# Patient Record
Sex: MALE | Race: WHITE | Employment: OTHER | URBAN - METROPOLITAN AREA
[De-identification: names, ages, dates, MRNs, and addresses within clinical notes are randomized per-mention and may not be internally consistent; named-entity substitution may affect disease eponyms.]

---

## 2017-01-19 ENCOUNTER — HOSPITAL ENCOUNTER (OUTPATIENT)
Dept: SURGERY | Age: 72
Discharge: HOME | End: 2017-01-19
Payer: MEDICARE

## 2017-01-19 VITALS
TEMPERATURE: 98.9 F | WEIGHT: 293.3 LBS | SYSTOLIC BLOOD PRESSURE: 159 MMHG | OXYGEN SATURATION: 95 % | HEART RATE: 58 BPM | RESPIRATION RATE: 21 BRPM | DIASTOLIC BLOOD PRESSURE: 73 MMHG | HEIGHT: 72 IN | BODY MASS INDEX: 39.73 KG/M2

## 2017-01-19 LAB
CREAT SERPL-MCNC: 0.83 MG/DL (ref 0.8–1.5)
HGB BLD-MCNC: 15.1 G/DL (ref 13.6–17.2)
POTASSIUM SERPL-SCNC: 3.6 MMOL/L (ref 3.5–5.1)

## 2017-01-19 PROCEDURE — 82565 ASSAY OF CREATININE: CPT | Performed by: ANESTHESIOLOGY

## 2017-01-19 PROCEDURE — 84132 ASSAY OF SERUM POTASSIUM: CPT | Performed by: ANESTHESIOLOGY

## 2017-01-19 PROCEDURE — 85018 HEMOGLOBIN: CPT | Performed by: ANESTHESIOLOGY

## 2017-01-19 PROCEDURE — 36415 COLL VENOUS BLD VENIPUNCTURE: CPT | Performed by: ANESTHESIOLOGY

## 2017-01-19 NOTE — PERIOP NOTES
Orders not yet received. Hgb/k/creat not yet resulted. Chart marked and placed in chart room for charge RN to follow up.

## 2017-01-19 NOTE — PERIOP NOTES
Patient verified name, , and surgery as listed in [facility]. TYPE  CASE:2            Orders:NOT YET received, CALLED AND REQUESTED VIA  TO S[facility]. Labs per surgeon:  NO ORDERS AT [address] [place] [address] per anesthesia protocol: HGB/K/CREAT  EKG  :  None needed   BLOOD CONSENT ON CHART       Pt medications obtained  MED LIST, med bottles NOT visualized. Requested pt to validate each medication, dose and frequency while here today. Instructed patient to continue previous medications as prescribed prior to surgery and  to take the following medications the day of surgery according to anesthesia guidelines with a small sip of water : NORCO IF NEEDED, LYRICA, INDERAL, EFFEXOR       Continue all previous medications unless otherwise directed. Instructed patient to hold  the following medications prior to surgery: ALL VITAMINS/SUPPLEMENTS     INSTRUCTED PT TO CONTINUE ASA 81MG (HX OF BLOOD CLOT). INSTRUCTED PT TO BRING CPAP    Patient instructed on the following and verbalized understanding:  Arrive at [facility] entrance, time of arrival to be called the day before by 1700. Responsible adult must drive patient to and from hospital, stay during surgery and 24 hours postoperatively. Npo after midnight including gum, mints and ice chips. Use hibiclens in the shower the night before and the morning of surgery. Leave all valuables at home. Instructed on no jewelry or body piercings on the dos. Bring insurance card and ID. No perfumes, oil, powder, colognes, makeup or  lotions on the skin. Patient verbalized understanding of all instructions and provided all medical/health information to the best of their ability.

## 2017-01-25 ENCOUNTER — ANESTHESIA EVENT (OUTPATIENT)
Dept: SURGERY | Age: 72
DRG: 493 | End: 2017-01-25
Payer: MEDICARE

## 2017-01-26 ENCOUNTER — ANESTHESIA (OUTPATIENT)
Dept: SURGERY | Age: 72
DRG: 493 | End: 2017-01-26
Payer: MEDICARE

## 2017-01-26 ENCOUNTER — SURGERY (OUTPATIENT)
Age: 72
End: 2017-01-26

## 2017-01-26 ENCOUNTER — HOSPITAL ENCOUNTER (INPATIENT)
Age: 72
LOS: 5 days | Discharge: NURSING, REHAB OR LONG-TERM CARE | DRG: 493 | End: 2017-01-31
Attending: ORTHOPAEDIC SURGERY | Admitting: ORTHOPAEDIC SURGERY
Payer: MEDICARE

## 2017-01-26 ENCOUNTER — APPOINTMENT (OUTPATIENT)
Dept: GENERAL RADIOLOGY | Age: 72
DRG: 493 | End: 2017-01-26
Attending: ORTHOPAEDIC SURGERY
Payer: MEDICARE

## 2017-01-26 PROCEDURE — 77030020274 HC MISC IMPL ORTHOPEDIC: Performed by: ORTHOPAEDIC SURGERY

## 2017-01-26 PROCEDURE — 74011000302 HC RX REV CODE- 302: Performed by: ORTHOPAEDIC SURGERY

## 2017-01-26 PROCEDURE — 74011000250 HC RX REV CODE- 250: Performed by: ANESTHESIOLOGY

## 2017-01-26 PROCEDURE — 76010000161 HC OR TIME 1 TO 1.5 HR INTENSV-TIER 1: Performed by: ORTHOPAEDIC SURGERY

## 2017-01-26 PROCEDURE — 76010010054 HC POST OP PAIN BLOCK: Performed by: ORTHOPAEDIC SURGERY

## 2017-01-26 PROCEDURE — 76060000034 HC ANESTHESIA 1.5 TO 2 HR: Performed by: ORTHOPAEDIC SURGERY

## 2017-01-26 PROCEDURE — 0L8P0ZZ DIVISION OF LEFT LOWER LEG TENDON, OPEN APPROACH: ICD-10-PCS | Performed by: ORTHOPAEDIC SURGERY

## 2017-01-26 PROCEDURE — 74011000250 HC RX REV CODE- 250

## 2017-01-26 PROCEDURE — 74011250636 HC RX REV CODE- 250/636: Performed by: ORTHOPAEDIC SURGERY

## 2017-01-26 PROCEDURE — 77030002916 HC SUT ETHLN J&J -A: Performed by: ORTHOPAEDIC SURGERY

## 2017-01-26 PROCEDURE — 77030025281 HC SPLNT ORTHGLS 1 BSNM -B: Performed by: ORTHOPAEDIC SURGERY

## 2017-01-26 PROCEDURE — 77030003602 HC NDL NRV BLK BBMI -B: Performed by: NURSE ANESTHETIST, CERTIFIED REGISTERED

## 2017-01-26 PROCEDURE — 74011250636 HC RX REV CODE- 250/636

## 2017-01-26 PROCEDURE — C1713 ANCHOR/SCREW BN/BN,TIS/BN: HCPCS | Performed by: ORTHOPAEDIC SURGERY

## 2017-01-26 PROCEDURE — 74011250636 HC RX REV CODE- 250/636: Performed by: ANESTHESIOLOGY

## 2017-01-26 PROCEDURE — 77030002933 HC SUT MCRYL J&J -A: Performed by: ORTHOPAEDIC SURGERY

## 2017-01-26 PROCEDURE — 0SPG04Z REMOVAL OF INTERNAL FIXATION DEVICE FROM LEFT ANKLE JOINT, OPEN APPROACH: ICD-10-PCS | Performed by: ORTHOPAEDIC SURGERY

## 2017-01-26 PROCEDURE — 74011250637 HC RX REV CODE- 250/637: Performed by: ORTHOPAEDIC SURGERY

## 2017-01-26 PROCEDURE — 77030011640 HC PAD GRND REM COVD -A: Performed by: ORTHOPAEDIC SURGERY

## 2017-01-26 PROCEDURE — 77030018836 HC SOL IRR NACL ICUM -A: Performed by: ORTHOPAEDIC SURGERY

## 2017-01-26 PROCEDURE — 86580 TB INTRADERMAL TEST: CPT | Performed by: ORTHOPAEDIC SURGERY

## 2017-01-26 PROCEDURE — 77030011884 HC TAPE CST PLSTR BSNM -A: Performed by: ORTHOPAEDIC SURGERY

## 2017-01-26 PROCEDURE — C1769 GUIDE WIRE: HCPCS | Performed by: ORTHOPAEDIC SURGERY

## 2017-01-26 PROCEDURE — 77030020754 HC CUF TRNQT 2BLA STRY -B: Performed by: ORTHOPAEDIC SURGERY

## 2017-01-26 PROCEDURE — 76942 ECHO GUIDE FOR BIOPSY: CPT | Performed by: ORTHOPAEDIC SURGERY

## 2017-01-26 PROCEDURE — 65270000029 HC RM PRIVATE

## 2017-01-26 PROCEDURE — 74011258636 HC RX REV CODE- 258/636: Performed by: ORTHOPAEDIC SURGERY

## 2017-01-26 PROCEDURE — 0SGJ04Z FUSION OF LEFT TARSAL JOINT WITH INTERNAL FIXATION DEVICE, OPEN APPROACH: ICD-10-PCS | Performed by: ORTHOPAEDIC SURGERY

## 2017-01-26 PROCEDURE — 76210000016 HC OR PH I REC 1 TO 1.5 HR: Performed by: ORTHOPAEDIC SURGERY

## 2017-01-26 PROCEDURE — 77030020753 HC CUF TRNQT 1BLA STRY -B: Performed by: ORTHOPAEDIC SURGERY

## 2017-01-26 PROCEDURE — 77030006788 HC BLD SAW OSC STRY -B: Performed by: ORTHOPAEDIC SURGERY

## 2017-01-26 DEVICE — IMPLANTABLE DEVICE: Type: IMPLANTABLE DEVICE | Site: ANKLE | Status: FUNCTIONAL

## 2017-01-26 RX ORDER — LIDOCAINE HYDROCHLORIDE 10 MG/ML
0.3 INJECTION INFILTRATION; PERINEURAL ONCE
Status: DISCONTINUED | OUTPATIENT
Start: 2017-01-26 | End: 2017-01-26 | Stop reason: HOSPADM

## 2017-01-26 RX ORDER — MIDAZOLAM HYDROCHLORIDE 1 MG/ML
2 INJECTION, SOLUTION INTRAMUSCULAR; INTRAVENOUS
Status: COMPLETED | OUTPATIENT
Start: 2017-01-26 | End: 2017-01-26

## 2017-01-26 RX ORDER — SODIUM CHLORIDE 0.9 % (FLUSH) 0.9 %
5-10 SYRINGE (ML) INJECTION AS NEEDED
Status: DISCONTINUED | OUTPATIENT
Start: 2017-01-26 | End: 2017-01-31 | Stop reason: HOSPADM

## 2017-01-26 RX ORDER — PHENOBARBITAL 32.4 MG/1
97.2 TABLET ORAL
Status: DISCONTINUED | OUTPATIENT
Start: 2017-01-26 | End: 2017-01-31 | Stop reason: HOSPADM

## 2017-01-26 RX ORDER — VENLAFAXINE HYDROCHLORIDE 75 MG/1
75 CAPSULE, EXTENDED RELEASE ORAL DAILY
Status: DISCONTINUED | OUTPATIENT
Start: 2017-01-27 | End: 2017-01-31 | Stop reason: HOSPADM

## 2017-01-26 RX ORDER — PREGABALIN 150 MG/1
150 CAPSULE ORAL 2 TIMES DAILY
Status: DISCONTINUED | OUTPATIENT
Start: 2017-01-26 | End: 2017-01-31 | Stop reason: HOSPADM

## 2017-01-26 RX ORDER — LOSARTAN POTASSIUM 50 MG/1
100 TABLET ORAL DAILY
Status: DISCONTINUED | OUTPATIENT
Start: 2017-01-27 | End: 2017-01-31 | Stop reason: HOSPADM

## 2017-01-26 RX ORDER — HYDROMORPHONE HYDROCHLORIDE 1 MG/ML
1 INJECTION, SOLUTION INTRAMUSCULAR; INTRAVENOUS; SUBCUTANEOUS
Status: DISCONTINUED | OUTPATIENT
Start: 2017-01-26 | End: 2017-01-31 | Stop reason: HOSPADM

## 2017-01-26 RX ORDER — LIDOCAINE HYDROCHLORIDE 20 MG/ML
INJECTION, SOLUTION EPIDURAL; INFILTRATION; INTRACAUDAL; PERINEURAL AS NEEDED
Status: DISCONTINUED | OUTPATIENT
Start: 2017-01-26 | End: 2017-01-26 | Stop reason: HOSPADM

## 2017-01-26 RX ORDER — PROPRANOLOL HYDROCHLORIDE 60 MG/1
120 CAPSULE, EXTENDED RELEASE ORAL DAILY
Status: DISCONTINUED | OUTPATIENT
Start: 2017-01-27 | End: 2017-01-31 | Stop reason: HOSPADM

## 2017-01-26 RX ORDER — FENTANYL CITRATE 50 UG/ML
100 INJECTION, SOLUTION INTRAMUSCULAR; INTRAVENOUS ONCE
Status: COMPLETED | OUTPATIENT
Start: 2017-01-26 | End: 2017-01-26

## 2017-01-26 RX ORDER — LANOLIN ALCOHOL/MO/W.PET/CERES
1000 CREAM (GRAM) TOPICAL DAILY
Status: DISCONTINUED | OUTPATIENT
Start: 2017-01-27 | End: 2017-01-31 | Stop reason: HOSPADM

## 2017-01-26 RX ORDER — SODIUM CHLORIDE 0.9 % (FLUSH) 0.9 %
5-10 SYRINGE (ML) INJECTION EVERY 8 HOURS
Status: DISCONTINUED | OUTPATIENT
Start: 2017-01-26 | End: 2017-01-26 | Stop reason: HOSPADM

## 2017-01-26 RX ORDER — OXYCODONE HYDROCHLORIDE 5 MG/1
10 TABLET ORAL
Status: DISCONTINUED | OUTPATIENT
Start: 2017-01-26 | End: 2017-01-31 | Stop reason: HOSPADM

## 2017-01-26 RX ORDER — ENOXAPARIN SODIUM 100 MG/ML
40 INJECTION SUBCUTANEOUS EVERY 24 HOURS
Status: DISCONTINUED | OUTPATIENT
Start: 2017-01-26 | End: 2017-01-31 | Stop reason: HOSPADM

## 2017-01-26 RX ORDER — SODIUM CHLORIDE, SODIUM LACTATE, POTASSIUM CHLORIDE, CALCIUM CHLORIDE 600; 310; 30; 20 MG/100ML; MG/100ML; MG/100ML; MG/100ML
100 INJECTION, SOLUTION INTRAVENOUS CONTINUOUS
Status: ACTIVE | OUTPATIENT
Start: 2017-01-26 | End: 2017-01-26

## 2017-01-26 RX ORDER — SODIUM CHLORIDE 0.9 % (FLUSH) 0.9 %
5-10 SYRINGE (ML) INJECTION AS NEEDED
Status: DISCONTINUED | OUTPATIENT
Start: 2017-01-26 | End: 2017-01-26 | Stop reason: HOSPADM

## 2017-01-26 RX ORDER — SODIUM CHLORIDE 0.9 % (FLUSH) 0.9 %
5-10 SYRINGE (ML) INJECTION EVERY 8 HOURS
Status: DISCONTINUED | OUTPATIENT
Start: 2017-01-26 | End: 2017-01-31 | Stop reason: HOSPADM

## 2017-01-26 RX ORDER — GUAIFENESIN 100 MG/5ML
81 LIQUID (ML) ORAL
Status: DISCONTINUED | OUTPATIENT
Start: 2017-01-26 | End: 2017-01-31 | Stop reason: HOSPADM

## 2017-01-26 RX ORDER — PROPOFOL 10 MG/ML
INJECTION, EMULSION INTRAVENOUS
Status: DISCONTINUED | OUTPATIENT
Start: 2017-01-26 | End: 2017-01-26 | Stop reason: HOSPADM

## 2017-01-26 RX ORDER — ONDANSETRON 2 MG/ML
4 INJECTION INTRAMUSCULAR; INTRAVENOUS
Status: DISCONTINUED | OUTPATIENT
Start: 2017-01-26 | End: 2017-01-31 | Stop reason: HOSPADM

## 2017-01-26 RX ORDER — OXYCODONE HYDROCHLORIDE 5 MG/1
5 TABLET ORAL
Status: DISCONTINUED | OUTPATIENT
Start: 2017-01-26 | End: 2017-01-31 | Stop reason: HOSPADM

## 2017-01-26 RX ORDER — HYDROCHLOROTHIAZIDE 25 MG/1
25 TABLET ORAL DAILY
Status: DISCONTINUED | OUTPATIENT
Start: 2017-01-27 | End: 2017-01-31 | Stop reason: HOSPADM

## 2017-01-26 RX ORDER — SODIUM CHLORIDE, SODIUM LACTATE, POTASSIUM CHLORIDE, CALCIUM CHLORIDE 600; 310; 30; 20 MG/100ML; MG/100ML; MG/100ML; MG/100ML
100 INJECTION, SOLUTION INTRAVENOUS CONTINUOUS
Status: DISCONTINUED | OUTPATIENT
Start: 2017-01-26 | End: 2017-01-26 | Stop reason: HOSPADM

## 2017-01-26 RX ORDER — FAMOTIDINE 10 MG/ML
20 INJECTION INTRAVENOUS ONCE
Status: COMPLETED | OUTPATIENT
Start: 2017-01-26 | End: 2017-01-26

## 2017-01-26 RX ORDER — PROPOFOL 10 MG/ML
INJECTION, EMULSION INTRAVENOUS AS NEEDED
Status: DISCONTINUED | OUTPATIENT
Start: 2017-01-26 | End: 2017-01-26 | Stop reason: HOSPADM

## 2017-01-26 RX ORDER — DEXTROSE, SODIUM CHLORIDE, SODIUM LACTATE, POTASSIUM CHLORIDE, AND CALCIUM CHLORIDE 5; .6; .31; .03; .02 G/100ML; G/100ML; G/100ML; G/100ML; G/100ML
50 INJECTION, SOLUTION INTRAVENOUS CONTINUOUS
Status: DISPENSED | OUTPATIENT
Start: 2017-01-26 | End: 2017-01-27

## 2017-01-26 RX ORDER — MELATONIN
2000 DAILY
Status: DISCONTINUED | OUTPATIENT
Start: 2017-01-27 | End: 2017-01-31 | Stop reason: HOSPADM

## 2017-01-26 RX ORDER — NALOXONE HYDROCHLORIDE 0.4 MG/ML
0.4 INJECTION, SOLUTION INTRAMUSCULAR; INTRAVENOUS; SUBCUTANEOUS AS NEEDED
Status: DISCONTINUED | OUTPATIENT
Start: 2017-01-26 | End: 2017-01-31 | Stop reason: HOSPADM

## 2017-01-26 RX ORDER — HYDROMORPHONE HYDROCHLORIDE 2 MG/ML
0.5 INJECTION, SOLUTION INTRAMUSCULAR; INTRAVENOUS; SUBCUTANEOUS
Status: DISCONTINUED | OUTPATIENT
Start: 2017-01-26 | End: 2017-01-31 | Stop reason: HOSPADM

## 2017-01-26 RX ORDER — ATORVASTATIN CALCIUM 40 MG/1
40 TABLET, FILM COATED ORAL
Status: DISCONTINUED | OUTPATIENT
Start: 2017-01-26 | End: 2017-01-31 | Stop reason: HOSPADM

## 2017-01-26 RX ADMIN — Medication 10 ML: at 22:10

## 2017-01-26 RX ADMIN — SODIUM CHLORIDE, SODIUM LACTATE, POTASSIUM CHLORIDE, AND CALCIUM CHLORIDE 100 ML/HR: 600; 310; 30; 20 INJECTION, SOLUTION INTRAVENOUS at 06:45

## 2017-01-26 RX ADMIN — SODIUM CHLORIDE, SODIUM LACTATE, POTASSIUM CHLORIDE, CALCIUM CHLORIDE, AND DEXTROSE MONOHYDRATE 50 ML/HR: 600; 310; 30; 20; 5 INJECTION, SOLUTION INTRAVENOUS at 17:43

## 2017-01-26 RX ADMIN — PREGABALIN 150 MG: 150 CAPSULE ORAL at 17:41

## 2017-01-26 RX ADMIN — MIDAZOLAM HYDROCHLORIDE 2 MG: 1 INJECTION, SOLUTION INTRAMUSCULAR; INTRAVENOUS at 06:58

## 2017-01-26 RX ADMIN — PROPOFOL 20 MG: 10 INJECTION, EMULSION INTRAVENOUS at 07:25

## 2017-01-26 RX ADMIN — FAMOTIDINE 20 MG: 10 INJECTION, SOLUTION INTRAVENOUS at 06:37

## 2017-01-26 RX ADMIN — TUBERCULIN PURIFIED PROTEIN DERIVATIVE 5 UNITS: 5 INJECTION INTRADERMAL at 22:17

## 2017-01-26 RX ADMIN — FENTANYL CITRATE 100 MCG: 50 INJECTION, SOLUTION INTRAMUSCULAR; INTRAVENOUS at 06:58

## 2017-01-26 RX ADMIN — HYDROMORPHONE HYDROCHLORIDE 0.5 MG: 2 INJECTION, SOLUTION INTRAMUSCULAR; INTRAVENOUS; SUBCUTANEOUS at 15:05

## 2017-01-26 RX ADMIN — ENOXAPARIN SODIUM 40 MG: 40 INJECTION SUBCUTANEOUS at 17:44

## 2017-01-26 RX ADMIN — OXYCODONE HYDROCHLORIDE 5 MG: 5 TABLET ORAL at 22:10

## 2017-01-26 RX ADMIN — CEFAZOLIN 3 G: 1 INJECTION, POWDER, FOR SOLUTION INTRAMUSCULAR; INTRAVENOUS; PARENTERAL at 19:00

## 2017-01-26 RX ADMIN — LIDOCAINE HYDROCHLORIDE 40 MG: 20 INJECTION, SOLUTION EPIDURAL; INFILTRATION; INTRACAUDAL; PERINEURAL at 07:25

## 2017-01-26 RX ADMIN — OXYCODONE HYDROCHLORIDE 5 MG: 5 TABLET ORAL at 17:41

## 2017-01-26 RX ADMIN — HYDROMORPHONE HYDROCHLORIDE 0.5 MG: 2 INJECTION, SOLUTION INTRAMUSCULAR; INTRAVENOUS; SUBCUTANEOUS at 15:44

## 2017-01-26 RX ADMIN — CEFAZOLIN 3 G: 1 INJECTION, POWDER, FOR SOLUTION INTRAMUSCULAR; INTRAVENOUS; PARENTERAL at 07:20

## 2017-01-26 RX ADMIN — ASPIRIN 81 MG CHEWABLE TABLET 81 MG: 81 TABLET CHEWABLE at 22:10

## 2017-01-26 RX ADMIN — PHENOBARBITAL 97.2 MG: 32.4 TABLET ORAL at 22:09

## 2017-01-26 RX ADMIN — PROPOFOL 30 MG: 10 INJECTION, EMULSION INTRAVENOUS at 07:26

## 2017-01-26 RX ADMIN — ATORVASTATIN CALCIUM 40 MG: 40 TABLET, FILM COATED ORAL at 22:10

## 2017-01-26 RX ADMIN — PROPOFOL 75 MCG/KG/MIN: 10 INJECTION, EMULSION INTRAVENOUS at 07:26

## 2017-01-26 RX ADMIN — Medication 5 ML: at 17:44

## 2017-01-26 NOTE — BRIEF OP NOTE
BRIEF OPERATIVE NOTE    Date of Procedure: 1/26/2017   Preoperative Diagnosis: Posterior tibial tendinitis of left leg [M76.822]  Postoperative Diagnosis: Posterior tibial tendinitis of left leg [M76.822]    Procedure(s):  LEFT ANKLE ARTHRODESIS TRIPLE   LEFT ANKLE HARDWARE REMOVAL/ HEEL  LEFT ACHILLES LENGTHENING   Surgeon(s) and Role:     * Abeba Ndiaye MD - Primary            Surgical Staff:  Circ-1: Jim Biggs RN  Radiology Technician: Amanda Prince, RT, R; Parviz Tobias, RT, R, CT  Scrub Tech-1: Neeta Knott  Event Time In   Incision Start 5615   Incision Close 2048     Anesthesia: General   Estimated Blood Loss: min  Specimens: * No specimens in log *   Findings: no  Complications: no  Implants:   Implant Name Type Inv.  Item Serial No.  Lot No. LRB No. Used Action   AUGMENT BONE GRAFT 3CC KIT Bone   [facility] [id number] Left 1 Implanted   FUSEFORCE NITINOL STAPLE KIT Other   [place] [id number] Left 2 Implanted   SCR COMP HDLSS 73-SVG [id number] -- - [id number]   SCR COMP HDLSS 80-QGJ [id number] --   [employer] [place] [id number] Left 1 Implanted

## 2017-01-26 NOTE — ANESTHESIA PROCEDURE NOTES
Popliteal block with ultrasound, saphenous without    Start time: 1/26/2017 6:56 AM  End time: 1/26/2017 7:01 AM  Performed by: Michel Angulo  Authorized by: Michel Angulo       Pre-procedure:    Indications: at surgeon's request and post-op pain management    Preanesthetic Checklist: patient identified, risks and benefits discussed, site marked, timeout performed, anesthesia consent given and patient being monitored    Timeout Time: 06:56          Block Type:   Block Type:  Popliteal  Laterality:  Left  Monitoring:  Continuous pulse ox, frequent vital sign checks, heart rate, oxygen and responsive to questions  Injection Technique:  Single shot  Procedures: ultrasound guided    Prep: chlorhexidine    Location:  Lower thigh  Needle Type:  Stimuplex  Needle Gauge:  20 G  Needle Localization:  Ultrasound guidance  Medication Injected:  0.5%  ropivacaine  Volume (mL):  18  Add'l Medication Injected:  1.5%  mepivacaine  Volume (mL):  18    Assessment:  Number of attempts:  1  Injection Assessment:  Incremental injection every 5 mL, local visualized surrounding nerve on ultrasound, negative aspiration for blood, no intravascular symptoms, no paresthesia and ultrasound image on chart  Patient tolerance:  Patient tolerated the procedure well with no immediate complications  ----------------------------------------------------------------------------------------------------------------------------    Saphenous block left:    TO 3204 [id number]    Chlorprep, 25g needle, 1:1 mix of local, 12ml at the proximal tibia and 8 ml at the ankle without apparent complictions

## 2017-01-26 NOTE — ANESTHESIA POSTPROCEDURE EVALUATION
Post-Anesthesia Evaluation and Assessment    Patient: Robel Loaiza MRN: [id number]  SSN: [id number]    YOB: 1945  Age: 70 y.o. Sex: male       Cardiovascular Function/Vital Signs  Visit Vitals    /68    Pulse (!) 56    Temp 36.4 °C (97.5 °F)    Resp 16    Wt 129.3 kg (285 lb)    SpO2 96%    BMI 38.65 kg/m2       Patient is status post total IV anesthesia anesthesia for Procedure(s):  LEFT ANKLE ARTHRODESIS TRIPLE   LEFT ANKLE HARDWARE REMOVAL/ HEEL  LEFT ACHILLES LENGTHENING . Nausea/Vomiting: None    Postoperative hydration reviewed and adequate. Pain:  Pain Scale 1: Numeric (0 - 10) (01/26/17 0849)  Pain Intensity 1: 0 (01/26/17 0849)   Managed    Neurological Status:   Neuro (WDL): Within Defined Limits (01/26/17 0849)   At baseline    Mental Status and Level of Consciousness: Alert and oriented     Pulmonary Status:   O2 Device: Room air (01/26/17 0907)   Adequate oxygenation and airway patent    Complications related to anesthesia: None    Post-anesthesia assessment completed.  No concerns    Signed By: Hawk Boothe MD     January 26, 2017

## 2017-01-26 NOTE — PROGRESS NOTES
Admission database completed. Patient oriented to room and call button.  New patient packet with  medication side effects fact sheet  given to patient and family and reviewed No concerns voiced at this time Primary nurse Kelli Florentino RN updated

## 2017-01-26 NOTE — PROGRESS NOTES
TRANSFER - IN REPORT:    Verbal report received from Johana Aviles RN on [address]  being received from PACU for routine post - op      Report consisted of patients Situation, Background, Assessment and   Recommendations(SBAR). Information from the following report(s) SBAR, Kardex, Procedure Summary, Intake/Output and MAR was reviewed with the receiving nurse. Opportunity for questions and clarification was provided.

## 2017-01-26 NOTE — PROGRESS NOTES
Dual skin assessment completed by Martha Acevedo.MARIMAR and Amara Holbrook.MARIMAR. Dressing to L foot reinforced due to drainage. No other skin impairments noted.

## 2017-01-26 NOTE — OP NOTES
[address]   OPERATIVE REPORT       Name:  Ulises Flynn   MR#:  [id number]   :  1945   Account #:  [de-identified]   Date of Adm:  2017       DATE OF PROCEDURE: 2017      PREOPERATIVE DIAGNOSES   1. Left heel painful hardware. 2. 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 POSTOPERATIVE DIAGNOSES   1. Left heel painful hardware. 2. 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 NAME OF PROCEDURE   1. Left heel hardware removal.   2. Left triple hemisection Achilles lengthening. 3. Left triple arthrodesis. SURGEON: Sergo Kumar MD.    ANESTHESIA: Popliteal block with monitored anesthesia care. ESTIMATED BLOOD LOSS: Minimal.      TOURNIQUET TIME: 69 minutes at 250 mmHg. ANTIBIOTIC PROPHYLAXIS: Ancef given prior to procedure. INDICATIONS FOR PROCEDURE: Mr. Idalmis Arreola is a 70-year-old white male   with a remote history of a flat foot reconstruction with   hindfoot arthritis who has failed conservative therapy and   desires surgical treatment. Risks and benefits of procedure   including, but not limited to risk of anesthetic complications   including myocardial infarction, stroke, and death, as well as   surgical complications including damage to nerves and blood   vessels, risk of infection, incomplete pain relief, risk of   malunion, risk of nonunion, need for additional surgery have   been discussed at length with the patient who understands the   risks and wishes to proceed with surgery at this time. DETAILS OF PROCEDURE: The patient's operative site was marked   with indelible ink in the preoperative holding area. A block was   placed by the Department of Anesthesia. The patient was taken to   the operating room, placed supine. During a preop surgical   timeout, the left lower extremity was identified as the correct   surgical site, prepped and draped in a standard sterile fashion   using ChloraPrep solution.  A triple hemisection Achilles   lengthening was performed through 3 small stab incisions in the   Achilles. At that point, a posterior approach to the heel was then   performed. The patient's previous screw from his osteotomy was   removed at that time. The wound was then irrigated and closed   using Monocryl and nylon sutures. Lateral approach to the sinus   tarsi was then performed at that time. The underlying subtalar   joint was then prepared using a curette an osteotome and a   separate incision was made over the anterior medial   talonavicular joint, which was also prepared in a similar   fashion. These were brought in the desired clinical alignment   and a Synthes 6.5 mm screw was advanced from the talar head and   neck junction down to the body of the calcaneus with good   purchase noted and 2 [facility] compression staples   were placed across the talonavicular joint as well. The wound   was then irrigated and closed using Monocryl and nylon sutures. Sterile dressing was then applied, followed by well-padded   posterior splint. Anesthesia was discontinued. The patient was   subsequently transferred to recovery bed, taken to the recovery   room in satisfactory condition. He appeared to tolerate the   procedure well. There were no apparent surgical or anesthetic   complications. All needle and sponge counts were correct.         MD Alyson Collado / Ryder Nunez   D:  01/26/2017   14:29   T:  01/26/2017   16:27   Job #:  [id number]

## 2017-01-26 NOTE — ANESTHESIA PREPROCEDURE EVALUATION
Anesthetic History               Review of Systems / Medical History  Patient summary reviewed and pertinent labs reviewed    Pulmonary        Sleep apnea: CPAP           Neuro/Psych     seizures (last one 20 years ago): well controlled         Cardiovascular    Hypertension: well controlled              Exercise tolerance: >4 METS  Comments: thromboembolus   GI/Hepatic/Renal         Renal disease (bladder bleeding last year)       Endo/Other        Morbid obesity     Other Findings              Physical Exam    Airway  Mallampati: III  TM Distance: 4 - 6 cm  Neck ROM: normal range of motion   Mouth opening: Normal     Cardiovascular  Regular rate and rhythm,  S1 and S2 normal,  no murmur, click, rub, or gallop             Dental         Pulmonary  Breath sounds clear to auscultation               Abdominal         Other Findings            Anesthetic Plan    ASA: 3  Anesthesia type: total IV anesthesia      Post-op pain plan if not by surgeon: peripheral nerve block single    Induction: Intravenous  Anesthetic plan and risks discussed with: Patient and Spouse

## 2017-01-26 NOTE — PERIOP NOTES
TRANSFER - OUT REPORT:    Verbal report given to [address]  being transferred   t) for routine progression of care       Report consisted of patients Situation, Background, Assessment and   Recommendations(SBAR). Information from the following report(s) SBAR, OR Summary, Procedure Summary, Intake/Output, MAR and Cardiac Rhythm SB was reviewed with the receiving nurse. Opportunity for questions and clarification was provided.       Patient transported with:   {TRANSPORTDETAILS:56262}

## 2017-01-27 LAB
MM INDURATION POC: NORMAL MM (ref 0–5)
PPD POC: NORMAL NEGATIVE

## 2017-01-27 PROCEDURE — 74011250637 HC RX REV CODE- 250/637: Performed by: ORTHOPAEDIC SURGERY

## 2017-01-27 PROCEDURE — 74011250636 HC RX REV CODE- 250/636: Performed by: ORTHOPAEDIC SURGERY

## 2017-01-27 PROCEDURE — 97162 PT EVAL MOD COMPLEX 30 MIN: CPT

## 2017-01-27 PROCEDURE — 97530 THERAPEUTIC ACTIVITIES: CPT

## 2017-01-27 PROCEDURE — 65270000029 HC RM PRIVATE

## 2017-01-27 RX ADMIN — B-COMPLEX W/ C & FOLIC ACID TAB 1 TABLET: TAB at 08:12

## 2017-01-27 RX ADMIN — CEFAZOLIN 3 G: 1 INJECTION, POWDER, FOR SOLUTION INTRAMUSCULAR; INTRAVENOUS; PARENTERAL at 02:00

## 2017-01-27 RX ADMIN — OXYCODONE HYDROCHLORIDE 5 MG: 5 TABLET ORAL at 14:45

## 2017-01-27 RX ADMIN — Medication 10 ML: at 06:00

## 2017-01-27 RX ADMIN — PREGABALIN 150 MG: 150 CAPSULE ORAL at 08:12

## 2017-01-27 RX ADMIN — PREGABALIN 150 MG: 150 CAPSULE ORAL at 17:00

## 2017-01-27 RX ADMIN — OXYCODONE HYDROCHLORIDE 5 MG: 5 TABLET ORAL at 22:38

## 2017-01-27 RX ADMIN — ENOXAPARIN SODIUM 40 MG: 40 INJECTION SUBCUTANEOUS at 17:00

## 2017-01-27 RX ADMIN — CYANOCOBALAMIN TAB 1000 MCG 1000 MCG: 1000 TAB at 08:12

## 2017-01-27 RX ADMIN — VITAMIN D, TAB 1000IU (100/BT) 2000 UNITS: 25 TAB at 08:12

## 2017-01-27 RX ADMIN — PROPRANOLOL HYDROCHLORIDE 120 MG: 60 CAPSULE, EXTENDED RELEASE ORAL at 08:12

## 2017-01-27 RX ADMIN — ASPIRIN 81 MG CHEWABLE TABLET 81 MG: 81 TABLET CHEWABLE at 22:38

## 2017-01-27 RX ADMIN — ATORVASTATIN CALCIUM 40 MG: 40 TABLET, FILM COATED ORAL at 22:38

## 2017-01-27 RX ADMIN — Medication 10 ML: at 22:38

## 2017-01-27 RX ADMIN — OXYCODONE HYDROCHLORIDE 5 MG: 5 TABLET ORAL at 08:10

## 2017-01-27 RX ADMIN — VENLAFAXINE HYDROCHLORIDE 75 MG: 75 CAPSULE, EXTENDED RELEASE ORAL at 08:12

## 2017-01-27 RX ADMIN — LOSARTAN POTASSIUM 100 MG: 50 TABLET ORAL at 08:12

## 2017-01-27 RX ADMIN — HYDROMORPHONE HYDROCHLORIDE 1 MG: 1 INJECTION, SOLUTION INTRAMUSCULAR; INTRAVENOUS; SUBCUTANEOUS at 01:25

## 2017-01-27 RX ADMIN — HYDROCHLOROTHIAZIDE 25 MG: 25 TABLET ORAL at 08:12

## 2017-01-27 RX ADMIN — PHENOBARBITAL 97.2 MG: 32.4 TABLET ORAL at 22:38

## 2017-01-27 NOTE — ADVANCED PRACTICE NURSE
Anesthesia Post Op Note: Patient denies any complaints or concerns with their anesthesia experience.

## 2017-01-27 NOTE — PROGRESS NOTES
Problem: Mobility Impaired (Adult and Pediatric)  Goal: *Acute Goals and Plan of Care (Insert Text)  STG:  (1.)Mr. John will move from supine to sit and sit to supine , scoot up and down and roll side to side with STAND BY ASSIST within 3 day(s). (2.)Mr. John will transfer from bed to chair and chair to bed with MINIMAL ASSIST using the least restrictive device within 3 day(s). (3.)Mr. John will ambulate with MAXIMAL ASSIST for 10 feet with the least restrictive device within 3 day(s). (4.)Mr. John will participate in therapeutic activity/exerices x 15 minutes for increased strength within 3 days. LTG:  (1.)Mr. John will move from supine to sit and sit to supine , scoot up and down and roll side to side in bed with INDEPENDENCE within 7 day(s). (2.)Mr. John will transfer from bed to chair and chair to bed with STAND BY ASSIST using the least restrictive device within 7 day(s). (3.)Mr. John will ambulate with CONTACT GUARD ASSIST for 30+ feet with the least restrictive device within 7 day(s). (4.)Mr. John will participate in therapeutic activity/exerices x 20 minutes for increased strength within 7 days.     ________________________________________________________________________________________________      PHYSICAL THERAPY: Daily Note, Treatment Day: Day of Assessment and PM 1/27/2017  INPATIENT: Hospital Day: 2  [place] / [place] / Product Type: Medicare /    L LE NWB     NAME/AGE/GENDER: Robel Loaiza is a 70 y.o. male        PRIMARY DIAGNOSIS: Posterior tibial tendinitis of left leg [M76.822] <principal problem not specified> <principal problem not specified>  Procedure(s) (LRB):  LEFT ANKLE ARTHRODESIS TRIPLE  (Left)  LEFT ANKLE HARDWARE REMOVAL/ HEEL (Left)  LEFT ACHILLES LENGTHENING  (Left)  1 Day Post-Op  ICD-10: Treatment Diagnosis:       · Generalized Muscle Weakness (M62.81)  · Difficulty in walking, Not elsewhere classified (R26.2)  · Other abnormalities of gait and mobility (R26.89)   Precaution/Allergies:  Adhesive and Bactrim [sulfamethoxazole-trimethoprim]       ASSESSMENT:      Mr. Marquis Leslie is a pleasant 70 y.o. male s/p above who is NWB on L LE. Pt was sitting up in chair and ready to get back into bed. Pt stood with mod assist x 2 from low chair and stood a few minutes to jeremy brief. pt then hopped a couple of small steps to bed using rolling walker and min/mod assist x 2. Pt did very well maintaining NWB status. Pt returned supine with min assist for L LE. Pt making good progress. will continue with POC. This section established at most recent assessment   PROBLEM LIST (Impairments causing functional limitations):  1. Decreased Strength  2. Decreased Transfer Abilities  3. Decreased Ambulation Ability/Technique  4. Decreased Balance  5. Decreased Activity Tolerance    INTERVENTIONS PLANNED: (Benefits and precautions of physical therapy have been discussed with the patient.)  1. Balance Exercise  2. Bed Mobility  3. Family Education  4. Gait Training  5. Neuromuscular Re-education/Strengthening  6. Therapeutic Activites  7. Therapeutic Exercise/Strengthening  8. Transfer Training  9. Group Therapy      TREATMENT PLAN: Frequency/Duration: twice daily for duration of hospital stay  Rehabilitation Potential For Stated Goals: GOOD      RECOMMENDED REHABILITATION/EQUIPMENT: (at time of discharge pending progress): Continue Skilled Therapy.                    HISTORY:   History of Present Injury/Illness (Reason for Referral):  Per pt chart: Review of Systems / Medical History  Patient summary reviewed and pertinent labs reviewed     Pulmonary  Sleep apnea: CPAP Neuro/Psych   seizures (last one 20 years ago): well controlled Cardiovascular  Hypertension: well controlled  Exercise tolerance: >4 METS  Comments: thromboembolus   GI/Hepatic/Renal  Renal disease (bladder bleeding last year) Endo/Other  Morbid obesity Other Findings Physical Exam     Airway  Mallampati: III  TM Distance: 4 - 6 cm  Neck ROM: normal range of motion   Mouth opening: Normal Cardiovascular  Regular rate and rhythm, S1 and S2 normal, no murmur, click, rub, or gallop Dental   Pulmonary  Breath sounds clear to auscultation Abdominal Other Findings          Past Medical History/Comorbidities:   Mr. Jass Garrido  has a past medical history of Anxiety; Cancer ([facility] [address] (2010); Chronic pain; Hyperlipidemia; Hypertension; Morbid obesity ([facility] [address] Osteoarthritis; Personal history of prostate cancer; Seizures ([facility] [address] (first one 30's); Sleep apnea; and Thromboembolus ([facility] [address] (~2009). Mr. Jass Garrido  has a past surgical history that includes bladder suspension; other surgical; knee arthroscopy; knee replacement; orthopaedic; prostatectomy (12/2010); and cataract removal (Bilateral). Social History/Living Environment:   Home Environment: Private residence  # Steps to Enter: 3  Rails to Enter: Yes  Hand Rails : Left  One/Two Story Residence: One story  Living Alone: No  Support Systems: Spouse/Significant Other/Partner  Patient Expects to be Discharged to[de-identified] Rehabilitation facility  Current DME Used/Available at Home: Cane, straight, Raised toilet seat, Shower chair, Commode, bedside  Tub or Shower Type: Tub/Shower combination  Prior Level of Function/Work/Activity:  Pt reports he lives in a one story home with his wife that has 3 steps to enter. He states that he uses a cane for ambulation but is independent with his ADLs. Number of Personal Factors/Comorbidities that affect the Plan of Care:  · Anxiety  · Cancer  · Morbid obesity 3+: HIGH COMPLEXITY   EXAMINATION:   Most Recent Physical Functioning:   Gross Assessment:                  Posture:     Balance:    Bed Mobility:     Wheelchair Mobility:     Transfers:  Sit to Stand:  Moderate assistance;Assist x2  Stand to Sit: Minimum assistance;Assist x2  Bed to Chair: Minimum assistance;Assist x2  Gait:  Left Side Weight Bearing: Non-weight bearing          Body Structures Involved:  1. Nerves  2. Bones  3. Joints  4. Muscles Body Functions Affected:  1. Sensory/Pain  2. Neuromusculoskeletal  3. Movement Related Activities and Participation Affected:  1. General Tasks and Demands  2. Mobility  3. Domestic Life   Number of elements that affect the Plan of Care: 4+: HIGH COMPLEXITY   CLINICAL PRESENTATION:   Presentation: Evolving clinical presentation with changing clinical characteristics: MODERATE COMPLEXITY   CLINICAL DECISION MAKIN [facility] Mobility Inpatient Short Form  How much difficulty does the patient currently have. .. Unable A Lot A Little None   1. Turning over in bed (including adjusting bedclothes, sheets and blankets)? [ ] 1   [ ] 2   [ ] 3   [X] 4   2. Sitting down on and standing up from a chair with arms ( e.g., wheelchair, bedside commode, etc.)   [ ] 1   [X] 2   [ ] 3   [ ] 4   3. Moving from lying on back to sitting on the side of the bed? [ ] 1   [ ] 2   [X] 3   [ ] 4   How much help from another person does the patient currently need. .. Total A Lot A Little None   4. Moving to and from a bed to a chair (including a wheelchair)? [ ] 1   [ ] 2   [X] 3   [ ] 4   5. Need to walk in hospital room? [ ] 1   [X] 2   [ ] 3   [ ] 4   6. Climbing 3-5 steps with a railing? [X] 1   [ ] 2   [ ] 3   [ ] 4   © , Trustees of [address] [place] [address], under license to [employer]. All rights reserved    Score:  Initial: 15 Most Recent: X (Date: -- )     Interpretation of Tool:  Represents activities that are increasingly more difficult (i.e. Bed mobility, Transfers, Gait).        Score 24 23 22-20 19-15 14-10 9-7 6       Modifier CH CI CJ CK CL CM CN         · Mobility - Walking and Moving Around:               - CURRENT STATUS:    CK - 40%-59% impaired, limited or restricted               - GOAL STATUS:           CJ - 20%-39% impaired, limited or restricted  - D/C STATUS:                       ---------------To be determined---------------  [place] / [place] / Product Type: Medicare /       Medical Necessity:     · Patient demonstrates good rehab potential due to higher previous functional level. Reason for Services/Other Comments:  · Patient continues to require skilled intervention due to decreased functional mobility, balance impairments, and difficulty with walking. Use of outcome tool(s) and clinical judgement create a POC that gives a: Questionable prediction of patient's progress: MODERATE COMPLEXITY                 TREATMENT:      Pre-treatment Symptoms/Complaints:  Some ankle/foot pain  Pain: Initial:      Post Session:  2/10      Therapeutic Activity: (    15 minutes): Therapeutic activities including Bed transfers, Chair transfers and Ambulation on level ground to improve mobility, strength and balance. Required minimal   to promote dynamic balance in standing. Treatment/Session Assessment:    · Response to Treatment:  See above  · Interdisciplinary Collaboration:  · Physical Therapy Assistant, Registered Nurse and Certified Nursing Assistant/Patient Care Technician  · After treatment position/precautions:  · Up in chair  · Bed/Chair-wheels locked  · Call light within reach  · Posey alarm activated  · Compliance with Program/Exercises: Will assess as treatment progresses. · Recommendations/Intent for next treatment session: \"Next visit will focus on advancements to more challenging activities and reduction in assistance provided\".   Total Treatment Duration:  PT Patient Time In/Time Out  Time In: 1345  Time Out: 91 [facility]

## 2017-01-27 NOTE — PROGRESS NOTES
Problem: Nutrition Deficit  Goal: *Optimize nutritional status  Nutrition  Reason for assessment: Referral received from nursing admission Malnutrition Screening Tool for recently lost 15# without trying and eating poorly due to decreased appetite. Assessment:   Diet order(s): regular  Food/Nutrition Patient History:  Patient s/p surgical intervention of ankle on 1/26. RD acknowledges patient with h/o HTN, renal disease, and CPAP use at home. Expect patient to discharge to rehab facility. Patient reports that he has lost 10-12 pounds due to some intentional diet changes. He reports that he has just stopped a little bit less of \"this and that. \"  After RD pressing patient for more information, he reports that he is a big meat and vegetable eater but likes his snacks. He reports snacking on little flaco cakes, chocolate mints, etc.  He states that he had a donut last night brought by his wife. He reports that he had to go to rehab ~1.5 years ago and thinks he gained 50 pounds while he was there due to excessive carbohydrate snacking. He reports that he does love fruit and vegetables but does not think to snack on those items. He does make fruit jams and butters at home. RD encouraged patient to choose nutrient dense snacks to replace high carbohydrate non-nutrient dense snacks. RD also introduced the concept of \"mindfull eating. \"  Patient is receptive. Anthropometrics: Ht: 6 ft,  Weight: 129.3 kg (285 lb) (wife states he has lost weight), unstated  , Body mass index is 38.65 kg/(m^2). BMI class of obesity class II.    WT / BMI 1/26/2017 1/19/2017 12/13/2016 9/12/2016 9/3/2016   WEIGHT 285 lb 293 lb 4.8 oz 295 lb 12.8 oz 286 lb 287 lb 9.6 oz       WT / BMI 8/30/2016 8/18/2016 8/15/2016 8/10/2016 8/7/2016 8/5/2016   WEIGHT 296 lb 296 lb 296 lb 297 lb 296 lb 296 lb       WT / BMI 8/3/2016 7/30/2016 7/29/2016 7/28/2016 7/27/2016   WEIGHT 300 lb 300 lb 298 lb 298 lb 6.4 oz 298 lb       WT / BMI 6/20/2016 6/17/2016 5/13/2016 4/29/2016 4/21/2016   WEIGHT 300 lb 300 lb 300 lb 304 lb 304 lb       WT / BMI 4/14/2016 3/7/2016 1/29/2016   WEIGHT 304 lb 5 oz 298 lb 291 lb   Patient has had a ~6 pound clinically insignificant weight loss over ~1 year. Patient has had a 10 pound, 3.3% clinically insignificant weight loss within ~1 month. Macronutrient needs:  EER:  9452-1057 kcal /day (12-15 kcal/kg listed BW)  EPR:   grams protein/day (1.2-1.5 grams/kg IBW)  Intake/Comparative Standards: Per RD meal rounds: 100% of breakfast. No recorded meal intake. Nutrition Diagnosis: Excessive energy intake r/t excessive snacking, as evidenced by patient obesity class II, diet recall high in non-nutrient dense high CHO snack foods. Intervention:  Meals and snacks: Continue current diet. Double protein portions as requested.      Candi Morgan [address], [address] [place] [address], [phone]

## 2017-01-27 NOTE — PROGRESS NOTES
ORTH FRACTURE PROGRESS NOTE    2017  Admit Date:   2017    Post Op day: 1 Day Post-Op    Subjective:    Deyanira John     PT/OT:   Gait:                    Vital Signs:    Patient Vitals for the past 8 hrs:   BP Temp Pulse Resp SpO2   17 1058 121/62 98.2 °F (36.8 °C) 71 16 96 %   17 0717 138/72 98.8 °F (37.1 °C) 75 16 92 %     Temp (24hrs), Av.6 °F (37 °C), Min:97.8 °F (36.6 °C), Max:99.5 °F (37.5 °C)      Pain Control:   Pain Assessment  Pain Scale 1: Numeric (0 - 10)  Pain Intensity 1: 0  Pain Onset 1: Post op  Pain Location 1: Foot  Pain Orientation 1: Left  Pain Description 1: Dull  Pain Intervention(s) 1: Medication (see MAR)    Meds:    Current Facility-Administered Medications   Medication Dose Route Frequency    sodium chloride (NS) flush 5-10 mL  5-10 mL IntraVENous PRN    oxyCODONE IR (ROXICODONE) tablet 10 mg  10 mg Oral ONCE PRN    HYDROmorphone (PF) (DILAUDID) injection 0.5 mg  0.5 mg IntraVENous Multiple    promethazine (PHENERGAN) with saline injection 6.25 mg  6.25 mg IntraVENous Q15MIN PRN    aspirin chewable tablet 81 mg  81 mg Oral QHS    atorvastatin (LIPITOR) tablet 40 mg  40 mg Oral QHS    cholecalciferol (VITAMIN D3) tablet 2,000 Units  2,000 Units Oral DAILY    cyanocobalamin tablet 1,000 mcg  1,000 mcg Oral DAILY    hydroCHLOROthiazide (HYDRODIURIL) tablet 25 mg  25 mg Oral DAILY    losartan (COZAAR) tablet 100 mg  100 mg Oral DAILY    multivitamin, stress formula (STRESS TAB) tablet 1 Tab  1 Tab Oral DAILY    mirabegron ER (MYRBETRIQ) tablet 50 mg  50 mg Oral DAILY    PHENobarbital (LUMINAL) tablet 97.2 mg  97.2 mg Oral QHS    pregabalin (LYRICA) capsule 150 mg  150 mg Oral BID    propranolol LA (INDERAL LA) capsule 120 mg  120 mg Oral DAILY    venlafaxine-SR (EFFEXOR-XR) capsule 75 mg  75 mg Oral DAILY    dextrose 5% lactated ringers infusion  50 mL/hr IntraVENous CONTINUOUS    sodium chloride (NS) flush 5-10 mL  5-10 mL IntraVENous Q8H    sodium chloride (NS) flush 5-10 mL  5-10 mL IntraVENous PRN    oxyCODONE IR (ROXICODONE) tablet 5 mg  5 mg Oral Q4H PRN    HYDROmorphone (PF) (DILAUDID) injection 1 mg  1 mg IntraVENous Q4H PRN    naloxone (NARCAN) injection 0.4 mg  0.4 mg IntraVENous PRN    ondansetron (ZOFRAN) injection 4 mg  4 mg IntraVENous Q4H PRN    enoxaparin (LOVENOX) injection 40 mg  40 mg SubCUTAneous Q24H       LAB:    No results for input(s): HCT, HCTEXT, HGB, HGBEXT, INR, HCTEXT, HGBEXT in the last 72 hours.     No lab exists for component: INREXT, INREXT    24 Hour Assessment Issues:    Oriented    Discharge Planning: SNF    Transfuse PRBC's:      Assessment & Physician's Comment:  Neurovascular checks within normal limits    Active Problems:    Tibialis tendinitis (3/12/2015)        Plan:  Cont PT  PICC today  Placement likely Monday    Elaina Hall MD

## 2017-01-27 NOTE — PROGRESS NOTES
Problem: Mobility Impaired (Adult and Pediatric)  Goal: *Acute Goals and Plan of Care (Insert Text)  STG:  (1.)Mr. John will move from supine to sit and sit to supine , scoot up and down and roll side to side with STAND BY ASSIST within 3 day(s). (2.)Mr. John will transfer from bed to chair and chair to bed with MINIMAL ASSIST using the least restrictive device within 3 day(s). (3.)Mr. John will ambulate with MAXIMAL ASSIST for 10 feet with the least restrictive device within 3 day(s). (4.)Mr. John will participate in therapeutic activity/exerices x 15 minutes for increased strength within 3 days. LTG:  (1.)Mr. John will move from supine to sit and sit to supine , scoot up and down and roll side to side in bed with INDEPENDENCE within 7 day(s). (2.)Mr. John will transfer from bed to chair and chair to bed with STAND BY ASSIST using the least restrictive device within 7 day(s). (3.)Mr. John will ambulate with CONTACT GUARD ASSIST for 30+ feet with the least restrictive device within 7 day(s). (4.)Mr. John will participate in therapeutic activity/exerices x 20 minutes for increased strength within 7 days.     ________________________________________________________________________________________________      PHYSICAL THERAPY: INITIAL ASSESSMENT, AM 1/27/2017  INPATIENT: Hospital Day: 2  [place] / Product Type: Medicare /    L LE NWB     NAME/AGE/GENDER: Aiyana Shoulder is a 70 y.o. male        PRIMARY DIAGNOSIS: Posterior tibial tendinitis of left leg [M76.822] <principal problem not specified> <principal problem not specified>  Procedure(s) (LRB):  LEFT ANKLE ARTHRODESIS TRIPLE  (Left)  LEFT ANKLE HARDWARE REMOVAL/ HEEL (Left)  LEFT ACHILLES LENGTHENING  (Left)  1 Day Post-Op  ICD-10: Treatment Diagnosis:       · Generalized Muscle Weakness (M62.81)  · Difficulty in walking, Not elsewhere classified (R26.2)  · Other abnormalities of gait and mobility (R26.89)   Precaution/Allergies:  Adhesive and Bactrim [sulfamethoxazole-trimethoprim]       ASSESSMENT:      Mr. Debbi Gleason is a pleasant 70 y.o. male s/p above who is NWB on L LE. Pt was supine in bed upon arrival reporting L ankle pain of 1/10 and agreeable to PT. Pt presents to PT with generalized weakness and decreased AROM in L hip flexion. Pt was WFL AROM and strength in his R LE with the exception of his R ankle which has no AROM, reportedly due to peripheral neuropathy. Pt was able to come sitting on the bed with SBA and demonstrates good sitting balance. Mr. Debbi Gleason required max assist x 2 with RW for STS and has fair to poor standing balance. Pt transferred to bedside chair using heel-toe pivot with mod assist x2/RW. Pt had episode of urinary incontinence during transfers and new brief was placed under pt in chair. Pt appeared fatigued after transfer but VS were stable. Pt could benefit from skilled PT to return to baseline. This section established at most recent assessment   PROBLEM LIST (Impairments causing functional limitations):  1. Decreased Strength  2. Decreased Transfer Abilities  3. Decreased Ambulation Ability/Technique  4. Decreased Balance  5. Decreased Activity Tolerance    INTERVENTIONS PLANNED: (Benefits and precautions of physical therapy have been discussed with the patient.)  1. Balance Exercise  2. Bed Mobility  3. Family Education  4. Gait Training  5. Neuromuscular Re-education/Strengthening  6. Therapeutic Activites  7. Therapeutic Exercise/Strengthening  8. Transfer Training  9. Group Therapy      TREATMENT PLAN: Frequency/Duration: twice daily for duration of hospital stay  Rehabilitation Potential For Stated Goals: GOOD      RECOMMENDED REHABILITATION/EQUIPMENT: (at time of discharge pending progress): Continue Skilled Therapy.                    HISTORY:   History of Present Injury/Illness (Reason for Referral):  Per pt chart: Review of Systems / Medical History  Patient summary reviewed and pertinent labs reviewed     Pulmonary  Sleep apnea: CPAP Neuro/Psych   seizures (last one 20 years ago): well controlled Cardiovascular  Hypertension: well controlled  Exercise tolerance: >4 METS  Comments: thromboembolus   GI/Hepatic/Renal  Renal disease (bladder bleeding last year) Endo/Other  Morbid obesity Other Findings                Physical Exam     Airway  Mallampati: III  TM Distance: 4 - 6 cm  Neck ROM: normal range of motion   Mouth opening: Normal Cardiovascular  Regular rate and rhythm, S1 and S2 normal, no murmur, click, rub, or gallop Dental   Pulmonary  Breath sounds clear to auscultation Abdominal Other Findings          Past Medical History/Comorbidities:   Mr. Erasmo Brown  has a past medical history of Anxiety; Cancer ([facility] [address] (2010); Chronic pain; Hyperlipidemia; Hypertension; Morbid obesity ([facility] [address] Osteoarthritis; Personal history of prostate cancer; Seizures ([facility] [address] (first one 30's); Sleep apnea; and Thromboembolus ([facility] [address] (~2009). Mr. Erasmo Brown  has a past surgical history that includes bladder suspension; other surgical; knee arthroscopy; knee replacement; orthopaedic; prostatectomy (12/2010); and cataract removal (Bilateral). Social History/Living Environment:   Home Environment: Private residence  # Steps to Enter: 3  Rails to Enter: Yes  Hand Rails : Left  One/Two Story Residence: One story  Living Alone: No  Support Systems: Spouse/Significant Other/Partner  Patient Expects to be Discharged to[de-identified] Rehabilitation facility  Current DME Used/Available at Home: Cane, straight, Raised toilet seat, Shower chair, Commode, bedside  Tub or Shower Type: Tub/Shower combination  Prior Level of Function/Work/Activity:  Pt reports he lives in a one story home with his wife that has 3 steps to enter. He states that he uses a cane for ambulation but is independent with his ADLs.       Number of Personal Factors/Comorbidities that affect the Plan of Care:  · Anxiety  · Cancer  · Morbid obesity 3+: HIGH COMPLEXITY   EXAMINATION:   Most Recent Physical Functioning:   Gross Assessment:  AROM: Generally decreased, functional (L HF)  Strength: Generally decreased, functional (L HF)               Posture:     Balance:  Sitting: Intact  Standing: Impaired  Standing - Static: Fair  Standing - Dynamic : Poor Bed Mobility:  Supine to Sit: Stand-by asssistance  Wheelchair Mobility:     Transfers:  Sit to Stand: Maximum assistance;Assist x2  Stand to Sit: Minimum assistance;Assist x2  Bed to Chair: Minimum assistance; Moderate assistance;Assist x2  Gait:  Left Side Weight Bearing: Non-weight bearing          Body Structures Involved:  1. Nerves  2. Bones  3. Joints  4. Muscles Body Functions Affected:  1. Sensory/Pain  2. Neuromusculoskeletal  3. Movement Related Activities and Participation Affected:  1. General Tasks and Demands  2. Mobility  3. Domestic Life   Number of elements that affect the Plan of Care: 4+: HIGH COMPLEXITY   CLINICAL PRESENTATION:   Presentation: Evolving clinical presentation with changing clinical characteristics: MODERATE COMPLEXITY   CLINICAL DECISION MAKIN [facility] Inpatient Short Form  How much difficulty does the patient currently have. .. Unable A Lot A Little None   1. Turning over in bed (including adjusting bedclothes, sheets and blankets)? [ ] 1   [ ] 2   [ ] 3   [X] 4   2. Sitting down on and standing up from a chair with arms ( e.g., wheelchair, bedside commode, etc.)   [ ] 1   [X] 2   [ ] 3   [ ] 4   3. Moving from lying on back to sitting on the side of the bed? [ ] 1   [ ] 2   [X] 3   [ ] 4   How much help from another person does the patient currently need. .. Total A Lot A Little None   4. Moving to and from a bed to a chair (including a wheelchair)? [ ] 1   [ ] 2   [X] 3   [ ] 4   5. Need to walk in hospital room? [ ] 1   [X] 2   [ ] 3   [ ] 4   6.   Climbing 3-5 steps with a railing? [X] 1   [ ] 2   [ ] 3   [ ] 4   © 2007, Trustees of [address] [place] [address] [address] [address], under license to [employer]. All rights reserved    Score:  Initial: 15 Most Recent: X (Date: -- )     Interpretation of Tool:  Represents activities that are increasingly more difficult (i.e. Bed mobility, Transfers, Gait). Score 24 23 22-20 19-15 14-10 9-7 6       Modifier CH CI CJ CK CL CM CN         · Mobility - Walking and Moving Around:               - CURRENT STATUS:    CK - 40%-59% impaired, limited or restricted               - GOAL STATUS:           CJ - 20%-39% impaired, limited or restricted               - D/C STATUS:                       ---------------To be determined---------------  [place] / [place] / Product Type: Medicare /       Medical Necessity:     · Patient demonstrates good rehab potential due to higher previous functional level. Reason for Services/Other Comments:  · Patient continues to require skilled intervention due to decreased functional mobility, balance impairments, and difficulty with walking. Use of outcome tool(s) and clinical judgement create a POC that gives a: Questionable prediction of patient's progress: MODERATE COMPLEXITY                 TREATMENT:   (In addition to Assessment/Re-Assessment sessions the following treatments were rendered)   Pre-treatment Symptoms/Complaints:  Some ankle/foot pain  Pain: Initial:   Pain Intensity 1: 1  Pain Location 1: Ankle  Pain Orientation 1: Left  Post Session:  2/10      Assessment/Reassessment only, no treatment provided today     Treatment/Session Assessment:    · Response to Treatment:  See above  · Interdisciplinary Collaboration:  · Physical Therapist  · Registered Nurse  · Rehabilitation Attendant  · After treatment position/precautions:  · Up in chair  · Bed/Chair-wheels locked  · Call light within reach  · Posey alarm activated  · Compliance with Program/Exercises:  Will assess as treatment progresses. · Recommendations/Intent for next treatment session: \"Next visit will focus on advancements to more challenging activities and reduction in assistance provided\".   Total Treatment Duration:  PT Patient Time In/Time Out  Time In: 0943  Time Out: [address] Mara Sanchez PT, DPT

## 2017-01-27 NOTE — PROGRESS NOTES
Care Management Interventions  PCP Verified by CM: Yes  Transition of Care Consult (CM Consult): Discharge Planning  Physical Therapy Consult: Yes  Current Support Network: Lives with Spouse  Confirm Follow Up Transport: Family  Plan discussed with Pt/Family/Caregiver: Yes  Freedom of Choice Offered: Yes  Discharge Location  Discharge Placement: Rehab Unit Subacute    SW dc screening. Adm with tendonitis s/p surgery and hardware removal. Will need rehab. Expressed preference for [facility], with [facility] and [facility] as distant second and third choices. Bed inquiries made in [facility]. Awaiting word re bed availability.

## 2017-01-28 LAB
MM INDURATION POC: NORMAL MM (ref 0–5)
PPD POC: NORMAL NEGATIVE

## 2017-01-28 PROCEDURE — 74011250636 HC RX REV CODE- 250/636: Performed by: ORTHOPAEDIC SURGERY

## 2017-01-28 PROCEDURE — 74011250637 HC RX REV CODE- 250/637: Performed by: ORTHOPAEDIC SURGERY

## 2017-01-28 PROCEDURE — 97530 THERAPEUTIC ACTIVITIES: CPT

## 2017-01-28 PROCEDURE — 97110 THERAPEUTIC EXERCISES: CPT

## 2017-01-28 PROCEDURE — 65270000029 HC RM PRIVATE

## 2017-01-28 RX ADMIN — Medication 10 ML: at 06:00

## 2017-01-28 RX ADMIN — PHENOBARBITAL 97.2 MG: 32.4 TABLET ORAL at 22:54

## 2017-01-28 RX ADMIN — LOSARTAN POTASSIUM 100 MG: 50 TABLET ORAL at 08:21

## 2017-01-28 RX ADMIN — ENOXAPARIN SODIUM 40 MG: 40 INJECTION SUBCUTANEOUS at 17:04

## 2017-01-28 RX ADMIN — PREGABALIN 150 MG: 150 CAPSULE ORAL at 08:21

## 2017-01-28 RX ADMIN — ATORVASTATIN CALCIUM 40 MG: 40 TABLET, FILM COATED ORAL at 22:54

## 2017-01-28 RX ADMIN — CYANOCOBALAMIN TAB 1000 MCG 1000 MCG: 1000 TAB at 08:21

## 2017-01-28 RX ADMIN — Medication 10 ML: at 22:54

## 2017-01-28 RX ADMIN — VITAMIN D, TAB 1000IU (100/BT) 2000 UNITS: 25 TAB at 08:21

## 2017-01-28 RX ADMIN — B-COMPLEX W/ C & FOLIC ACID TAB 1 TABLET: TAB at 08:21

## 2017-01-28 RX ADMIN — PROPRANOLOL HYDROCHLORIDE 120 MG: 60 CAPSULE, EXTENDED RELEASE ORAL at 08:21

## 2017-01-28 RX ADMIN — VENLAFAXINE HYDROCHLORIDE 75 MG: 75 CAPSULE, EXTENDED RELEASE ORAL at 08:21

## 2017-01-28 RX ADMIN — ASPIRIN 81 MG CHEWABLE TABLET 81 MG: 81 TABLET CHEWABLE at 22:54

## 2017-01-28 RX ADMIN — PREGABALIN 150 MG: 150 CAPSULE ORAL at 17:04

## 2017-01-28 RX ADMIN — HYDROCHLOROTHIAZIDE 25 MG: 25 TABLET ORAL at 08:21

## 2017-01-28 NOTE — PROGRESS NOTES
Problem: Mobility Impaired (Adult and Pediatric)  Goal: *Acute Goals and Plan of Care (Insert Text)  STG:  (1.)Mr. John will move from supine to sit and sit to supine , scoot up and down and roll side to side with STAND BY ASSIST within 3 day(s). (2.)Mr. John will transfer from bed to chair and chair to bed with MINIMAL ASSIST using the least restrictive device within 3 day(s). (3.)Mr. John will ambulate with MAXIMAL ASSIST for 10 feet with the least restrictive device within 3 day(s). (4.)Mr. John will participate in therapeutic activity/exerices x 15 minutes for increased strength within 3 days. LTG:  (1.)Mr. John will move from supine to sit and sit to supine , scoot up and down and roll side to side in bed with INDEPENDENCE within 7 day(s). (2.)Mr. John will transfer from bed to chair and chair to bed with STAND BY ASSIST using the least restrictive device within 7 day(s). (3.)Mr. John will ambulate with CONTACT GUARD ASSIST for 30+ feet with the least restrictive device within 7 day(s). (4.)Mr. John will participate in therapeutic activity/exerices x 20 minutes for increased strength within 7 days.     ________________________________________________________________________________________________      PHYSICAL THERAPY: Daily Note, Treatment Day: 1st and AM 1/28/2017  INPATIENT: Hospital Day: 3  [place] / Product Type: Medicare /    L LE NWB     NAME/AGE/GENDER: Monisha Krishnamurthy is a 70 y.o. male        PRIMARY DIAGNOSIS: Posterior tibial tendinitis of left leg [M76.822] <principal problem not specified> <principal problem not specified>  Procedure(s) (LRB):  LEFT ANKLE ARTHRODESIS TRIPLE  (Left)  LEFT ANKLE HARDWARE REMOVAL/ HEEL (Left)  LEFT ACHILLES LENGTHENING  (Left)  2 Days Post-Op  ICD-10: Treatment Diagnosis:       · Generalized Muscle Weakness (M62.81)  · Difficulty in walking, Not elsewhere classified (R26.2)  · Other abnormalities of gait and mobility (R26.89)   Precaution/Allergies:  Adhesive and Bactrim [sulfamethoxazole-trimethoprim]       ASSESSMENT:      Mr. Arlene Melendez is a pleasant 70 y.o. male s/p above who is NWB on L LE and supine in bed. He performed supine exercises below with excellent ability then sat up without my help. He stood into the walker from a slightly elevated bed with CGA maintaining NWB and took a couple hops to the chair with CGA. Excellent progress. This section established at most recent assessment   PROBLEM LIST (Impairments causing functional limitations):  1. Decreased Strength  2. Decreased Transfer Abilities  3. Decreased Ambulation Ability/Technique  4. Decreased Balance  5. Decreased Activity Tolerance    INTERVENTIONS PLANNED: (Benefits and precautions of physical therapy have been discussed with the patient.)  1. Balance Exercise  2. Bed Mobility  3. Family Education  4. Gait Training  5. Neuromuscular Re-education/Strengthening  6. Therapeutic Activites  7. Therapeutic Exercise/Strengthening  8. Transfer Training  9. Group Therapy      TREATMENT PLAN: Frequency/Duration: twice daily for duration of hospital stay  Rehabilitation Potential For Stated Goals: GOOD      RECOMMENDED REHABILITATION/EQUIPMENT: (at time of discharge pending progress): Continue Skilled Therapy.                    HISTORY:   History of Present Injury/Illness (Reason for Referral):  Per pt chart: Review of Systems / Medical History  Patient summary reviewed and pertinent labs reviewed     Pulmonary  Sleep apnea: CPAP Neuro/Psych   seizures (last one 20 years ago): well controlled Cardiovascular  Hypertension: well controlled  Exercise tolerance: >4 METS  Comments: thromboembolus   GI/Hepatic/Renal  Renal disease (bladder bleeding last year) Endo/Other  Morbid obesity Other Findings                Physical Exam     Airway  Mallampati: III  TM Distance: 4 - 6 cm  Neck ROM: normal range of motion   Mouth opening: Normal Cardiovascular  Regular rate and rhythm, S1 and S2 normal, no murmur, click, rub, or gallop Dental   Pulmonary  Breath sounds clear to auscultation Abdominal Other Findings          Past Medical History/Comorbidities:   Mr. Melba Cope  has a past medical history of Anxiety; Cancer ([facility] [address] (2010); Chronic pain; Hyperlipidemia; Hypertension; Morbid obesity ([facility] [address] Osteoarthritis; Personal history of prostate cancer; Seizures ([facility] [address] (first one 30's); Sleep apnea; and Thromboembolus ([facility] [address] (~2009). Mr. Melba Cope  has a past surgical history that includes bladder suspension; other surgical; knee arthroscopy; knee replacement; orthopaedic; prostatectomy (12/2010); and cataract removal (Bilateral). Social History/Living Environment:   Home Environment: Private residence  # Steps to Enter: 3  Rails to Enter: Yes  Hand Rails : Left  One/Two Story Residence: One story  Living Alone: No  Support Systems: Spouse/Significant Other/Partner  Patient Expects to be Discharged to[de-identified] Rehabilitation facility  Current DME Used/Available at Home: Cane, straight, Raised toilet seat, Shower chair, Commode, bedside  Tub or Shower Type: Tub/Shower combination  Prior Level of Function/Work/Activity:  Pt reports he lives in a one story home with his wife that has 3 steps to enter. He states that he uses a cane for ambulation but is independent with his ADLs.       Number of Personal Factors/Comorbidities that affect the Plan of Care:  · Anxiety  · Cancer  · Morbid obesity 3+: HIGH COMPLEXITY   EXAMINATION:   Most Recent Physical Functioning:   Gross Assessment:                  Posture:     Balance:  Sitting: Without support  Standing: Impaired  Standing - Static: Fair  Standing - Dynamic : Fair Bed Mobility:  Supine to Sit: Modified independent  Wheelchair Mobility:     Transfers:  Sit to Stand: Contact guard assistance  Stand to Sit: Contact guard assistance  Bed to Chair: Contact guard assistance  Gait:             Body Structures Involved:  1. Nerves  2. Bones  3. Joints  4. Muscles Body Functions Affected:  1. Sensory/Pain  2. Neuromusculoskeletal  3. Movement Related Activities and Participation Affected:  1. General Tasks and Demands  2. Mobility  3. Domestic Life   Number of elements that affect the Plan of Care: 4+: HIGH COMPLEXITY   CLINICAL PRESENTATION:   Presentation: Evolving clinical presentation with changing clinical characteristics: MODERATE COMPLEXITY   CLINICAL DECISION MAKIN [facility] Mobility Inpatient Short Form  How much difficulty does the patient currently have. .. Unable A Lot A Little None   1. Turning over in bed (including adjusting bedclothes, sheets and blankets)? [ ] 1   [ ] 2   [ ] 3   [X] 4   2. Sitting down on and standing up from a chair with arms ( e.g., wheelchair, bedside commode, etc.)   [ ] 1   [X] 2   [ ] 3   [ ] 4   3. Moving from lying on back to sitting on the side of the bed? [ ] 1   [ ] 2   [X] 3   [ ] 4   How much help from another person does the patient currently need. .. Total A Lot A Little None   4. Moving to and from a bed to a chair (including a wheelchair)? [ ] 1   [ ] 2   [X] 3   [ ] 4   5. Need to walk in hospital room? [ ] 1   [X] 2   [ ] 3   [ ] 4   6. Climbing 3-5 steps with a railing? [X] 1   [ ] 2   [ ] 3   [ ] 4   © , Trustees of [address] [place] [address], under license to [employer]. All rights reserved    Score:  Initial: 15 Most Recent: X (Date: -- )     Interpretation of Tool:  Represents activities that are increasingly more difficult (i.e. Bed mobility, Transfers, Gait).        Score 24 23 22-20 19-15 14-10 9-7 6       Modifier CH CI CJ CK CL CM CN         · Mobility - Walking and Moving Around:               - CURRENT STATUS:    CK - 40%-59% impaired, limited or restricted               - GOAL STATUS:           CJ - 20%-39% impaired, limited or restricted               - D/C STATUS: ---------------To be determined---------------  [place] / Product Type: Medicare /       Medical Necessity:     · Patient demonstrates good rehab potential due to higher previous functional level. Reason for Services/Other Comments:  · Patient continues to require skilled intervention due to decreased functional mobility, balance impairments, and difficulty with walking. Use of outcome tool(s) and clinical judgement create a POC that gives a: Questionable prediction of patient's progress: MODERATE COMPLEXITY                 TREATMENT:      Pre-treatment Symptoms/Complaints:  Some ankle/foot pain  Pain: Initial:   Pain Intensity 1: 0  Post Session:  2/10      Therapeutic Activity: (    10 minutes): Therapeutic activities including Bed transfers, Chair transfers and a couple hop steps maintaining NWB to get to the chair to improve mobility, strength and balance. Required minimal   to promote dynamic balance in standing. Therapeutic Exercise: (15 Minutes):  Exercises per grid below to improve mobility and strength. Required minimal verbal cues to promote proper body mechanics. Progressed complexity of movement as indicated. Date:  1/28/17 Date:   Date:     Activity/Exercise Parameters Parameters Parameters   Supine heel slides 15x B     Supine SAQ 10x B     Supine hip abd 10x B     Seated TKE 10x B                              Treatment/Session Assessment:    · Response to Treatment:  See above  · Interdisciplinary Collaboration:  · Physical Therapy Assistant and Registered Nurse  · After treatment position/precautions:  · Up in chair, Bed/Chair-wheels locked, Call light within reach, RN notified and Family at bedside  · Compliance with Program/Exercises: Will assess as treatment progresses. · Recommendations/Intent for next treatment session: \"Next visit will focus on advancements to more challenging activities and reduction in assistance provided\".   Total Treatment Duration:  PT Patient Time In/Time Out  Time In: 0925  Time Out: [facility]

## 2017-01-28 NOTE — PROGRESS NOTES
Problem: Mobility Impaired (Adult and Pediatric)  Goal: *Acute Goals and Plan of Care (Insert Text)  STG:  (1.)Mr. John will move from supine to sit and sit to supine , scoot up and down and roll side to side with STAND BY ASSIST within 3 day(s). (2.)Mr. John will transfer from bed to chair and chair to bed with MINIMAL ASSIST using the least restrictive device within 3 day(s). (3.)Mr. John will ambulate with MAXIMAL ASSIST for 10 feet with the least restrictive device within 3 day(s). (4.)Mr. John will participate in therapeutic activity/exerices x 15 minutes for increased strength within 3 days. LTG:  (1.)Mr. John will move from supine to sit and sit to supine , scoot up and down and roll side to side in bed with INDEPENDENCE within 7 day(s). (2.)Mr. John will transfer from bed to chair and chair to bed with STAND BY ASSIST using the least restrictive device within 7 day(s). (3.)Mr. John will ambulate with CONTACT GUARD ASSIST for 30+ feet with the least restrictive device within 7 day(s). (4.)Mr. John will participate in therapeutic activity/exerices x 20 minutes for increased strength within 7 days.     ________________________________________________________________________________________________      PHYSICAL THERAPY: Daily Note, Treatment Day: 1st and PM 1/28/2017  INPATIENT: Hospital Day: 3  [place] / Product Type: Medicare /    L LE NWB     NAME/AGE/GENDER: Miguel Olivas is a 70 y.o. male        PRIMARY DIAGNOSIS: Posterior tibial tendinitis of left leg [M76.822] <principal problem not specified> <principal problem not specified>  Procedure(s) (LRB):  LEFT ANKLE ARTHRODESIS TRIPLE  (Left)  LEFT ANKLE HARDWARE REMOVAL/ HEEL (Left)  LEFT ACHILLES LENGTHENING  (Left)  2 Days Post-Op  ICD-10: Treatment Diagnosis:       · Generalized Muscle Weakness (M62.81)  · Difficulty in walking, Not elsewhere classified (R26.2)  · Other abnormalities of gait and mobility (R26.89)   Precaution/Allergies:  Adhesive and Bactrim [sulfamethoxazole-trimethoprim]       ASSESSMENT:      Mr. Sandra Hammond is a pleasant 70 y.o. male s/p above who is NWB on L LE and supine in bed. He performed supine exercises below with excellent ability then sat up without my help. He stood into the walker from a slightly elevated bed with CGA maintaining NWB and took a couple hops to the chair with CGA. Excellent progress. PM:  Patient had more difficulty standing from low chair than the elevated bed requiring moderate assist x1. Once standing he did well with his balance and WB status and hopped to the bed. He performed a couple leg exercises then was able to lay down without any assistance. Steady progress. This section established at most recent assessment   PROBLEM LIST (Impairments causing functional limitations):  1. Decreased Strength  2. Decreased Transfer Abilities  3. Decreased Ambulation Ability/Technique  4. Decreased Balance  5. Decreased Activity Tolerance    INTERVENTIONS PLANNED: (Benefits and precautions of physical therapy have been discussed with the patient.)  1. Balance Exercise  2. Bed Mobility  3. Family Education  4. Gait Training  5. Neuromuscular Re-education/Strengthening  6. Therapeutic Activites  7. Therapeutic Exercise/Strengthening  8. Transfer Training  9. Group Therapy      TREATMENT PLAN: Frequency/Duration: twice daily for duration of hospital stay  Rehabilitation Potential For Stated Goals: GOOD      RECOMMENDED REHABILITATION/EQUIPMENT: (at time of discharge pending progress): Continue Skilled Therapy.                    HISTORY:   History of Present Injury/Illness (Reason for Referral):  Per pt chart: Review of Systems / Medical History  Patient summary reviewed and pertinent labs reviewed     Pulmonary  Sleep apnea: CPAP Neuro/Psych   seizures (last one 20 years ago): well controlled Cardiovascular  Hypertension: well controlled  Exercise tolerance: >4 METS  Comments: thromboembolus   GI/Hepatic/Renal  Renal disease (bladder bleeding last year) Endo/Other  Morbid obesity Other Findings                Physical Exam     Airway  Mallampati: III  TM Distance: 4 - 6 cm  Neck ROM: normal range of motion   Mouth opening: Normal Cardiovascular  Regular rate and rhythm, S1 and S2 normal, no murmur, click, rub, or gallop Dental   Pulmonary  Breath sounds clear to auscultation Abdominal Other Findings          Past Medical History/Comorbidities:   Mr. Melissa Ho  has a past medical history of Anxiety; Cancer ([facility] [address] (2010); Chronic pain; Hyperlipidemia; Hypertension; Morbid obesity ([facility] [address] Osteoarthritis; Personal history of prostate cancer; Seizures ([facility] [address] (first one 30's); Sleep apnea; and Thromboembolus ([facility] [address] (~2009). Mr. Melissa Ho  has a past surgical history that includes bladder suspension; other surgical; knee arthroscopy; knee replacement; orthopaedic; prostatectomy (12/2010); and cataract removal (Bilateral). Social History/Living Environment:   Home Environment: Private residence  # Steps to Enter: 3  Rails to Enter: Yes  Hand Rails : Left  One/Two Story Residence: One story  Living Alone: No  Support Systems: Spouse/Significant Other/Partner  Patient Expects to be Discharged to[de-identified] Rehabilitation facility  Current DME Used/Available at Home: Cane, straight, Raised toilet seat, Shower chair, Commode, bedside  Tub or Shower Type: Tub/Shower combination  Prior Level of Function/Work/Activity:  Pt reports he lives in a one story home with his wife that has 3 steps to enter. He states that he uses a cane for ambulation but is independent with his ADLs.       Number of Personal Factors/Comorbidities that affect the Plan of Care:  · Anxiety  · Cancer  · Morbid obesity 3+: HIGH COMPLEXITY   EXAMINATION:   Most Recent Physical Functioning:   Gross Assessment:                  Posture:     Balance:  Sitting: Without support  Standing: Impaired  Standing - Static: Fair  Standing - Dynamic : Fair Bed Mobility:  Supine to Sit: Modified independent  Sit to Supine: Supervision  Wheelchair Mobility:     Transfers:  Sit to Stand: Moderate assistance (from low chair)  Stand to Sit: Contact guard assistance  Bed to Chair: Contact guard assistance  Gait:             Body Structures Involved:  1. Nerves  2. Bones  3. Joints  4. Muscles Body Functions Affected:  1. Sensory/Pain  2. Neuromusculoskeletal  3. Movement Related Activities and Participation Affected:  1. General Tasks and Demands  2. Mobility  3. Domestic Life   Number of elements that affect the Plan of Care: 4+: HIGH COMPLEXITY   CLINICAL PRESENTATION:   Presentation: Evolving clinical presentation with changing clinical characteristics: MODERATE COMPLEXITY   CLINICAL DECISION MAKIN [facility] Inpatient Short Form  How much difficulty does the patient currently have. .. Unable A Lot A Little None   1. Turning over in bed (including adjusting bedclothes, sheets and blankets)? [ ] 1   [ ] 2   [ ] 3   [X] 4   2. Sitting down on and standing up from a chair with arms ( e.g., wheelchair, bedside commode, etc.)   [ ] 1   [X] 2   [ ] 3   [ ] 4   3. Moving from lying on back to sitting on the side of the bed? [ ] 1   [ ] 2   [X] 3   [ ] 4   How much help from another person does the patient currently need. .. Total A Lot A Little None   4. Moving to and from a bed to a chair (including a wheelchair)? [ ] 1   [ ] 2   [X] 3   [ ] 4   5. Need to walk in hospital room? [ ] 1   [X] 2   [ ] 3   [ ] 4   6. Climbing 3-5 steps with a railing? [X] 1   [ ] 2   [ ] 3   [ ] 4   © 2007, Trustees of [address] [place] [address] [address] [address], under license to [employer]. All rights reserved    Score:  Initial: 15 Most Recent: X (Date: -- )     Interpretation of Tool:  Represents activities that are increasingly more difficult (i.e. Bed mobility, Transfers, Gait).        Score 24 23 22-20 19-15 14-10 9-7 6       Modifier CH CI CJ CK CL CM CN         · Mobility - Walking and Moving Around:               - CURRENT STATUS:    CK - 40%-59% impaired, limited or restricted               - GOAL STATUS:           CJ - 20%-39% impaired, limited or restricted               - D/C STATUS:                       ---------------To be determined---------------  [place] / Product Type: Medicare /       Medical Necessity:     · Patient demonstrates good rehab potential due to higher previous functional level. Reason for Services/Other Comments:  · Patient continues to require skilled intervention due to decreased functional mobility, balance impairments, and difficulty with walking. Use of outcome tool(s) and clinical judgement create a POC that gives a: Questionable prediction of patient's progress: MODERATE COMPLEXITY                 TREATMENT:      Pre-treatment Symptoms/Complaints:  Some ankle/foot pain  Pain: Initial:   Pain Intensity 1: 0  Post Session:  2/10      Therapeutic Activity: (    15 minutes): Therapeutic activities including Bed transfers, Chair transfers and a couple hop steps maintaining NWB to get to the chair, below exercises sitting EOB to improve mobility, strength and balance. Required minimal   to promote dynamic balance in standing.         Date:  1/28/17  AM Date:  1/28/17  PM Date:     Activity/Exercise Parameters Parameters Parameters   Supine heel slides 15x B     Supine SAQ 10x B     Supine hip abd 10x B     Seated TKE 10x B 10x B manual resistance    Seated HS curls  10x B manual resistance    Seated marching  20x B                 Treatment/Session Assessment:    · Response to Treatment:  See above  · Interdisciplinary Collaboration:  · Physical Therapy Assistant and Registered Nurse  · After treatment position/precautions:  · Supine in bed, Bed/Chair-wheels locked, Bed in low position, Call light within reach, RN notified and Family at bedside  · Compliance with Program/Exercises: Will assess as treatment progresses. · Recommendations/Intent for next treatment session: \"Next visit will focus on advancements to more challenging activities and reduction in assistance provided\".   Total Treatment Duration:  PT Patient Time In/Time Out  Time In: 1255  Time Out: Dusty 109, PTA

## 2017-01-28 NOTE — PROGRESS NOTES
ORTH PROGRESS NOTE    2017  Admit Date:   2017    Post Op day: 2 Days Post-Op    Subjective:    Farrah Jay doing well     PT/OT:   Gait:                    Vital Signs:    Patient Vitals for the past 8 hrs:   BP Temp Pulse Resp SpO2   17 0710 121/58 98 °F (36.7 °C) (!) 53 20 92 %   17 0430 128/61 98.9 °F (37.2 °C) (!) 59 20 93 %     Temp (24hrs), Av.6 °F (37 °C), Min:97.7 °F (36.5 °C), Max:100.4 °F (38 °C)      Pain Control:   Pain Assessment  Pain Scale 1: Visual  Pain Intensity 1: 0  Pain Onset 1: Post op  Pain Location 1: Ankle  Pain Orientation 1: Left  Pain Description 1: Aching  Pain Intervention(s) 1: Medication (see MAR)    Meds:    Current Facility-Administered Medications   Medication Dose Route Frequency    sodium chloride (NS) flush 5-10 mL  5-10 mL IntraVENous PRN    oxyCODONE IR (ROXICODONE) tablet 10 mg  10 mg Oral ONCE PRN    HYDROmorphone (PF) (DILAUDID) injection 0.5 mg  0.5 mg IntraVENous Multiple    promethazine (PHENERGAN) with saline injection 6.25 mg  6.25 mg IntraVENous Q15MIN PRN    aspirin chewable tablet 81 mg  81 mg Oral QHS    atorvastatin (LIPITOR) tablet 40 mg  40 mg Oral QHS    cholecalciferol (VITAMIN D3) tablet 2,000 Units  2,000 Units Oral DAILY    cyanocobalamin tablet 1,000 mcg  1,000 mcg Oral DAILY    hydroCHLOROthiazide (HYDRODIURIL) tablet 25 mg  25 mg Oral DAILY    losartan (COZAAR) tablet 100 mg  100 mg Oral DAILY    multivitamin, stress formula (STRESS TAB) tablet 1 Tab  1 Tab Oral DAILY    mirabegron ER (MYRBETRIQ) tablet 50 mg  50 mg Oral DAILY    PHENobarbital (LUMINAL) tablet 97.2 mg  97.2 mg Oral QHS    pregabalin (LYRICA) capsule 150 mg  150 mg Oral BID    propranolol LA (INDERAL LA) capsule 120 mg  120 mg Oral DAILY    venlafaxine-SR (EFFEXOR-XR) capsule 75 mg  75 mg Oral DAILY    sodium chloride (NS) flush 5-10 mL  5-10 mL IntraVENous Q8H    sodium chloride (NS) flush 5-10 mL  5-10 mL IntraVENous PRN    oxyCODONE IR (ROXICODONE) tablet 5 mg  5 mg Oral Q4H PRN    HYDROmorphone (PF) (DILAUDID) injection 1 mg  1 mg IntraVENous Q4H PRN    naloxone (NARCAN) injection 0.4 mg  0.4 mg IntraVENous PRN    ondansetron (ZOFRAN) injection 4 mg  4 mg IntraVENous Q4H PRN    enoxaparin (LOVENOX) injection 40 mg  40 mg SubCUTAneous Q24H       LAB:    No results for input(s): HCT, HCTEXT, HGB, HGBEXT, INR, HCTEXT, HGBEXT in the last 72 hours.     No lab exists for component: INREXT, INREXT    24 Hour Assessment Issues:    Oriented    Discharge Planning: SNF    Transfuse PRBC's:      Assessment & Physician's Comment:  Dressing is clean, dry, and intact  Neurovascular checks within normal limits    Active Problems:    Tibialis tendinitis (3/12/2015)        Plan:  Dispo to rehab Ekaterina Arnett MD

## 2017-01-29 LAB
MM INDURATION POC: 0 MM (ref 0–5)
PPD POC: NEGATIVE NEGATIVE

## 2017-01-29 PROCEDURE — 97110 THERAPEUTIC EXERCISES: CPT

## 2017-01-29 PROCEDURE — 97530 THERAPEUTIC ACTIVITIES: CPT

## 2017-01-29 PROCEDURE — 74011250636 HC RX REV CODE- 250/636: Performed by: ORTHOPAEDIC SURGERY

## 2017-01-29 PROCEDURE — 74011250637 HC RX REV CODE- 250/637: Performed by: ORTHOPAEDIC SURGERY

## 2017-01-29 PROCEDURE — 65270000029 HC RM PRIVATE

## 2017-01-29 RX ORDER — ADHESIVE BANDAGE
30 BANDAGE TOPICAL DAILY PRN
Status: DISCONTINUED | OUTPATIENT
Start: 2017-01-29 | End: 2017-01-31 | Stop reason: HOSPADM

## 2017-01-29 RX ADMIN — B-COMPLEX W/ C & FOLIC ACID TAB 1 TABLET: TAB at 08:23

## 2017-01-29 RX ADMIN — ENOXAPARIN SODIUM 40 MG: 40 INJECTION SUBCUTANEOUS at 17:04

## 2017-01-29 RX ADMIN — ASPIRIN 81 MG CHEWABLE TABLET 81 MG: 81 TABLET CHEWABLE at 22:49

## 2017-01-29 RX ADMIN — Medication 10 ML: at 22:58

## 2017-01-29 RX ADMIN — VENLAFAXINE HYDROCHLORIDE 75 MG: 75 CAPSULE, EXTENDED RELEASE ORAL at 08:23

## 2017-01-29 RX ADMIN — Medication 10 ML: at 06:00

## 2017-01-29 RX ADMIN — VITAMIN D, TAB 1000IU (100/BT) 2000 UNITS: 25 TAB at 08:23

## 2017-01-29 RX ADMIN — PREGABALIN 150 MG: 150 CAPSULE ORAL at 08:24

## 2017-01-29 RX ADMIN — MAGNESIUM HYDROXIDE 30 ML: 400 SUSPENSION ORAL at 22:51

## 2017-01-29 RX ADMIN — LOSARTAN POTASSIUM 100 MG: 50 TABLET ORAL at 08:23

## 2017-01-29 RX ADMIN — ATORVASTATIN CALCIUM 40 MG: 40 TABLET, FILM COATED ORAL at 22:49

## 2017-01-29 RX ADMIN — PHENOBARBITAL 97.2 MG: 32.4 TABLET ORAL at 22:49

## 2017-01-29 RX ADMIN — HYDROCHLOROTHIAZIDE 25 MG: 25 TABLET ORAL at 08:23

## 2017-01-29 RX ADMIN — PREGABALIN 150 MG: 150 CAPSULE ORAL at 17:04

## 2017-01-29 RX ADMIN — CYANOCOBALAMIN TAB 1000 MCG 1000 MCG: 1000 TAB at 08:23

## 2017-01-29 RX ADMIN — PROPRANOLOL HYDROCHLORIDE 120 MG: 60 CAPSULE, EXTENDED RELEASE ORAL at 08:23

## 2017-01-29 RX ADMIN — Medication 5 ML: at 14:09

## 2017-01-29 NOTE — PROGRESS NOTES
Problem: Mobility Impaired (Adult and Pediatric)  Goal: *Acute Goals and Plan of Care (Insert Text)  STG:  (1.)Mr. John will move from supine to sit and sit to supine , scoot up and down and roll side to side with STAND BY ASSIST within 3 day(s). MET 1/29  (2.)Mr. John will transfer from bed to chair and chair to bed with MINIMAL ASSIST using the least restrictive device within 3 day(s). MET 1/29  (3.)Mr. John will ambulate with MAXIMAL ASSIST for 10 feet with the least restrictive device within 3 day(s). (4.)Mr. John will participate in therapeutic activity/exerices x 15 minutes for increased strength within 3 days. MET 1/28    LTG:  (1.)Mr. John will move from supine to sit and sit to supine , scoot up and down and roll side to side in bed with INDEPENDENCE within 7 day(s). (2.)Mr. John will transfer from bed to chair and chair to bed with STAND BY ASSIST using the least restrictive device within 7 day(s). (3.)Mr. John will ambulate with CONTACT GUARD ASSIST for 30+ feet with the least restrictive device within 7 day(s). (4.)Mr. John will participate in therapeutic activity/exerices x 20 minutes for increased strength within 7 days.     ________________________________________________________________________________________________      PHYSICAL THERAPY: Daily Note, Treatment Day: 2nd and PM 1/29/2017  INPATIENT: Hospital Day: 4  [place] / Product Type: Medicare /    L LE NWB     NAME/AGE/GENDER: Drake Membreno is a 70 y.o. male        PRIMARY DIAGNOSIS: Posterior tibial tendinitis of left leg [M76.822] <principal problem not specified> <principal problem not specified>  Procedure(s) (LRB):  LEFT ANKLE ARTHRODESIS TRIPLE  (Left)  LEFT ANKLE HARDWARE REMOVAL/ HEEL (Left)  LEFT ACHILLES LENGTHENING  (Left)  3 Days Post-Op  ICD-10: Treatment Diagnosis:       · Generalized Muscle Weakness (M62.81)  · Difficulty in walking, Not elsewhere classified (R26.2)  · Other abnormalities of gait and mobility (R26.89)   Precaution/Allergies:  Adhesive and Bactrim [sulfamethoxazole-trimethoprim]       ASSESSMENT:      Mr. Dyana Del Rosario is a pleasant 70 y.o. male s/p above who is NWB on L LE and supine in bed. He performed supine exercises below with excellent ability then sat up without my help using the trapeze bar. He did a couple more exercises then stood up from a slightly elevated bed and maintained NWB well hopping to the chair. PM:  Patient sat up from recliner and stood into the walker with SBA only  Not needing assistance to stand from low chair today. He took a couple hops well to the bed and layed down using the trapeze without help. Making steady progress. This section established at most recent assessment   PROBLEM LIST (Impairments causing functional limitations):  1. Decreased Strength  2. Decreased Transfer Abilities  3. Decreased Ambulation Ability/Technique  4. Decreased Balance  5. Decreased Activity Tolerance    INTERVENTIONS PLANNED: (Benefits and precautions of physical therapy have been discussed with the patient.)  1. Balance Exercise  2. Bed Mobility  3. Family Education  4. Gait Training  5. Neuromuscular Re-education/Strengthening  6. Therapeutic Activites  7. Therapeutic Exercise/Strengthening  8. Transfer Training  9. Group Therapy      TREATMENT PLAN: Frequency/Duration: twice daily for duration of hospital stay  Rehabilitation Potential For Stated Goals: GOOD      RECOMMENDED REHABILITATION/EQUIPMENT: (at time of discharge pending progress): Continue Skilled Therapy.                    HISTORY:   History of Present Injury/Illness (Reason for Referral):  Per pt chart: Review of Systems / Medical History  Patient summary reviewed and pertinent labs reviewed     Pulmonary  Sleep apnea: CPAP Neuro/Psych   seizures (last one 20 years ago): well controlled Cardiovascular  Hypertension: well controlled  Exercise tolerance: >4 METS  Comments: thromboembolus   GI/Hepatic/Renal  Renal disease (bladder bleeding last year) Endo/Other  Morbid obesity Other Findings                Physical Exam     Airway  Mallampati: III  TM Distance: 4 - 6 cm  Neck ROM: normal range of motion   Mouth opening: Normal Cardiovascular  Regular rate and rhythm, S1 and S2 normal, no murmur, click, rub, or gallop Dental   Pulmonary  Breath sounds clear to auscultation Abdominal Other Findings          Past Medical History/Comorbidities:   Mr. Debbi Gleason  has a past medical history of Anxiety; Cancer ([facility] [address] (2010); Chronic pain; Hyperlipidemia; Hypertension; Morbid obesity ([facility] [address] Osteoarthritis; Personal history of prostate cancer; Seizures ([facility] [address] (first one 30's); Sleep apnea; and Thromboembolus ([facility] [address] (~2009). Mr. Debbi Gleasno  has a past surgical history that includes bladder suspension; other surgical; knee arthroscopy; knee replacement; orthopaedic; prostatectomy (12/2010); and cataract removal (Bilateral). Social History/Living Environment:   Home Environment: Private residence  # Steps to Enter: 3  Rails to Enter: Yes  Hand Rails : Left  One/Two Story Residence: One story  Living Alone: No  Support Systems: Spouse/Significant Other/Partner  Patient Expects to be Discharged to[de-identified] Rehabilitation facility  Current DME Used/Available at Home: Cane, straight, Raised toilet seat, Shower chair, Commode, bedside  Tub or Shower Type: Tub/Shower combination  Prior Level of Function/Work/Activity:  Pt reports he lives in a one story home with his wife that has 3 steps to enter. He states that he uses a cane for ambulation but is independent with his ADLs.       Number of Personal Factors/Comorbidities that affect the Plan of Care:  · Anxiety  · Cancer  · Morbid obesity 3+: HIGH COMPLEXITY   EXAMINATION:   Most Recent Physical Functioning:   Gross Assessment:                  Posture:     Balance:    Bed Mobility:  Supine to Sit: Modified independent (using trapeze)  Sit to Supine: Modified independent  Wheelchair Mobility:     Transfers:  Sit to Stand: Stand-by asssistance  Stand to Sit: Stand-by asssistance  Bed to Chair: Contact guard assistance  Gait:  Left Side Weight Bearing: Non-weight bearing          Body Structures Involved:  1. Nerves  2. Bones  3. Joints  4. Muscles Body Functions Affected:  1. Sensory/Pain  2. Neuromusculoskeletal  3. Movement Related Activities and Participation Affected:  1. General Tasks and Demands  2. Mobility  3. Domestic Life   Number of elements that affect the Plan of Care: 4+: HIGH COMPLEXITY   CLINICAL PRESENTATION:   Presentation: Evolving clinical presentation with changing clinical characteristics: MODERATE COMPLEXITY   CLINICAL DECISION MAKIN [facility] Mobility Inpatient Short Form  How much difficulty does the patient currently have. .. Unable A Lot A Little None   1. Turning over in bed (including adjusting bedclothes, sheets and blankets)? [ ] 1   [ ] 2   [ ] 3   [X] 4   2. Sitting down on and standing up from a chair with arms ( e.g., wheelchair, bedside commode, etc.)   [ ] 1   [X] 2   [ ] 3   [ ] 4   3. Moving from lying on back to sitting on the side of the bed? [ ] 1   [ ] 2   [X] 3   [ ] 4   How much help from another person does the patient currently need. .. Total A Lot A Little None   4. Moving to and from a bed to a chair (including a wheelchair)? [ ] 1   [ ] 2   [X] 3   [ ] 4   5. Need to walk in hospital room? [ ] 1   [X] 2   [ ] 3   [ ] 4   6. Climbing 3-5 steps with a railing? [X] 1   [ ] 2   [ ] 3   [ ] 4   © 2007, Trustees of [address] [place] [address] [address] [address], under license to [employer]. All rights reserved    Score:  Initial: 15 Most Recent: X (Date: -- )     Interpretation of Tool:  Represents activities that are increasingly more difficult (i.e. Bed mobility, Transfers, Gait).        Score 24 23 22-20 19-15 14-10 9-7 6       Modifier CH CI CJ CK CL CM CN         · Mobility - Walking and Moving Around:               - CURRENT STATUS:    CK - 40%-59% impaired, limited or restricted               - GOAL STATUS:           CJ - 20%-39% impaired, limited or restricted               - D/C STATUS:                       ---------------To be determined---------------  [place] / Product Type: Medicare /       Medical Necessity:     · Patient demonstrates good rehab potential due to higher previous functional level. Reason for Services/Other Comments:  · Patient continues to require skilled intervention due to decreased functional mobility, balance impairments, and difficulty with walking. Use of outcome tool(s) and clinical judgement create a POC that gives a: Questionable prediction of patient's progress: MODERATE COMPLEXITY                 TREATMENT:      Pre-treatment Symptoms/Complaints:  Some ankle/foot pain  Pain: Initial:   Pain Intensity 1: 0  Post Session:  2/10      Therapeutic Activity: (    10 minutes): Therapeutic activities including Bed transfers and Chair transfers to improve mobility, strength and balance. Required minimal   to promote static and dynamic balance in standing. Date:  1/28/17  AM Date:  1/28/17  PM Date:  1/29/17  AM   Activity/Exercise Parameters Parameters Parameters   Supine heel slides 15x B  15x B   Supine SAQ 10x B     Supine hip abd 10x B  15x B   Seated TKE 10x B 10x B manual resistance 20x B   Seated HS curls  10x B manual resistance    Seated marching  20x B 20x B   Supine SLR   10x B          Treatment/Session Assessment:    · Response to Treatment:  See above  · Interdisciplinary Collaboration:  · Physical Therapy Assistant and Registered Nurse  · After treatment position/precautions:  · Supine in bed, Bed/Chair-wheels locked, Bed in low position, Call light within reach, RN notified and Family at bedside  · Compliance with Program/Exercises: Will assess as treatment progresses.   · Recommendations/Intent for next treatment session: \"Next visit will focus on advancements to more challenging activities and reduction in assistance provided\".   Total Treatment Duration:  PT Patient Time In/Time Out  Time In: 1300  Time Out: 1315     Wili Cloud PTA

## 2017-01-29 NOTE — PROGRESS NOTES
Problem: Mobility Impaired (Adult and Pediatric)  Goal: *Acute Goals and Plan of Care (Insert Text)  STG:  (1.)Mr. John will move from supine to sit and sit to supine , scoot up and down and roll side to side with STAND BY ASSIST within 3 day(s). MET 1/29  (2.)Mr. John will transfer from bed to chair and chair to bed with MINIMAL ASSIST using the least restrictive device within 3 day(s). MET 1/29  (3.)Mr. John will ambulate with MAXIMAL ASSIST for 10 feet with the least restrictive device within 3 day(s). (4.)Mr. John will participate in therapeutic activity/exerices x 15 minutes for increased strength within 3 days. MET 1/28    LTG:  (1.)Mr. John will move from supine to sit and sit to supine , scoot up and down and roll side to side in bed with INDEPENDENCE within 7 day(s). (2.)Mr. John will transfer from bed to chair and chair to bed with STAND BY ASSIST using the least restrictive device within 7 day(s). (3.)Mr. John will ambulate with CONTACT GUARD ASSIST for 30+ feet with the least restrictive device within 7 day(s). (4.)Mr. John will participate in therapeutic activity/exerices x 20 minutes for increased strength within 7 days.     ________________________________________________________________________________________________      PHYSICAL THERAPY: Daily Note, Treatment Day: 2nd and AM 1/29/2017  INPATIENT: Hospital Day: 4  [place] / Product Type: Medicare /    L LE NWB     NAME/AGE/GENDER: Jennifer Lane is a 70 y.o. male        PRIMARY DIAGNOSIS: Posterior tibial tendinitis of left leg [M76.822] <principal problem not specified> <principal problem not specified>  Procedure(s) (LRB):  LEFT ANKLE ARTHRODESIS TRIPLE  (Left)  LEFT ANKLE HARDWARE REMOVAL/ HEEL (Left)  LEFT ACHILLES LENGTHENING  (Left)  3 Days Post-Op  ICD-10: Treatment Diagnosis:       · Generalized Muscle Weakness (M62.81)  · Difficulty in walking, Not elsewhere classified (R26.2)  · Other abnormalities of gait and mobility (R26.89)   Precaution/Allergies:  Adhesive and Bactrim [sulfamethoxazole-trimethoprim]       ASSESSMENT:      Mr. Bridger Pederson is a pleasant 70 y.o. male s/p above who is NWB on L LE and supine in bed. He performed supine exercises below with excellent ability then sat up without my help using the trapeze bar. He did a couple more exercises then stood up from a slightly elevated bed and maintained NWB well hopping to the chair. This section established at most recent assessment   PROBLEM LIST (Impairments causing functional limitations):  1. Decreased Strength  2. Decreased Transfer Abilities  3. Decreased Ambulation Ability/Technique  4. Decreased Balance  5. Decreased Activity Tolerance    INTERVENTIONS PLANNED: (Benefits and precautions of physical therapy have been discussed with the patient.)  1. Balance Exercise  2. Bed Mobility  3. Family Education  4. Gait Training  5. Neuromuscular Re-education/Strengthening  6. Therapeutic Activites  7. Therapeutic Exercise/Strengthening  8. Transfer Training  9. Group Therapy      TREATMENT PLAN: Frequency/Duration: twice daily for duration of hospital stay  Rehabilitation Potential For Stated Goals: GOOD      RECOMMENDED REHABILITATION/EQUIPMENT: (at time of discharge pending progress): Continue Skilled Therapy.                    HISTORY:   History of Present Injury/Illness (Reason for Referral):  Per pt chart: Review of Systems / Medical History  Patient summary reviewed and pertinent labs reviewed     Pulmonary  Sleep apnea: CPAP Neuro/Psych   seizures (last one 20 years ago): well controlled Cardiovascular  Hypertension: well controlled  Exercise tolerance: >4 METS  Comments: thromboembolus   GI/Hepatic/Renal  Renal disease (bladder bleeding last year) Endo/Other  Morbid obesity Other Findings                Physical Exam     Airway  Mallampati: III  TM Distance: 4 - 6 cm  Neck ROM: normal range of motion   Mouth opening: Normal Cardiovascular  Regular rate and rhythm, S1 and S2 normal, no murmur, click, rub, or gallop Dental   Pulmonary  Breath sounds clear to auscultation Abdominal Other Findings          Past Medical History/Comorbidities:   Mr. Daryle Grew  has a past medical history of Anxiety; Cancer ([facility] [address] (2010); Chronic pain; Hyperlipidemia; Hypertension; Morbid obesity ([facility] [address] Osteoarthritis; Personal history of prostate cancer; Seizures ([facility] [address] (first one 30's); Sleep apnea; and Thromboembolus ([facility] [address] (~2009). Mr. Daryle Grew  has a past surgical history that includes bladder suspension; other surgical; knee arthroscopy; knee replacement; orthopaedic; prostatectomy (12/2010); and cataract removal (Bilateral). Social History/Living Environment:   Home Environment: Private residence  # Steps to Enter: 3  Rails to Enter: Yes  Hand Rails : Left  One/Two Story Residence: One story  Living Alone: No  Support Systems: Spouse/Significant Other/Partner  Patient Expects to be Discharged to[de-identified] Rehabilitation facility  Current DME Used/Available at Home: Cane, straight, Raised toilet seat, Shower chair, Commode, bedside  Tub or Shower Type: Tub/Shower combination  Prior Level of Function/Work/Activity:  Pt reports he lives in a one story home with his wife that has 3 steps to enter. He states that he uses a cane for ambulation but is independent with his ADLs.       Number of Personal Factors/Comorbidities that affect the Plan of Care:  · Anxiety  · Cancer  · Morbid obesity 3+: HIGH COMPLEXITY   EXAMINATION:   Most Recent Physical Functioning:   Gross Assessment:                  Posture:     Balance:    Bed Mobility:  Supine to Sit: Modified independent (using trapeze)  Wheelchair Mobility:     Transfers:  Sit to Stand: Contact guard assistance  Stand to Sit: Stand-by asssistance  Bed to Chair: Contact guard assistance  Gait:  Left Side Weight Bearing: Non-weight bearing          Body Structures Involved:  1. Nerves  2. Bones  3. Joints  4. Muscles Body Functions Affected:  1. Sensory/Pain  2. Neuromusculoskeletal  3. Movement Related Activities and Participation Affected:  1. General Tasks and Demands  2. Mobility  3. Domestic Life   Number of elements that affect the Plan of Care: 4+: HIGH COMPLEXITY   CLINICAL PRESENTATION:   Presentation: Evolving clinical presentation with changing clinical characteristics: MODERATE COMPLEXITY   CLINICAL DECISION MAKIN [facility] Mobility Inpatient Short Form  How much difficulty does the patient currently have. .. Unable A Lot A Little None   1. Turning over in bed (including adjusting bedclothes, sheets and blankets)? [ ] 1   [ ] 2   [ ] 3   [X] 4   2. Sitting down on and standing up from a chair with arms ( e.g., wheelchair, bedside commode, etc.)   [ ] 1   [X] 2   [ ] 3   [ ] 4   3. Moving from lying on back to sitting on the side of the bed? [ ] 1   [ ] 2   [X] 3   [ ] 4   How much help from another person does the patient currently need. .. Total A Lot A Little None   4. Moving to and from a bed to a chair (including a wheelchair)? [ ] 1   [ ] 2   [X] 3   [ ] 4   5. Need to walk in hospital room? [ ] 1   [X] 2   [ ] 3   [ ] 4   6. Climbing 3-5 steps with a railing? [X] 1   [ ] 2   [ ] 3   [ ] 4   © , Trustees of [address] [place] [address], under license to [employer]. All rights reserved    Score:  Initial: 15 Most Recent: X (Date: -- )     Interpretation of Tool:  Represents activities that are increasingly more difficult (i.e. Bed mobility, Transfers, Gait).        Score 24 23 22-20 19-15 14-10 9-7 6       Modifier CH CI CJ CK CL CM CN         · Mobility - Walking and Moving Around:               - CURRENT STATUS:    CK - 40%-59% impaired, limited or restricted               - GOAL STATUS:           CJ - 20%-39% impaired, limited or restricted               - D/C STATUS: ---------------To be determined---------------  [place] / Product Type: Medicare /       Medical Necessity:     · Patient demonstrates good rehab potential due to higher previous functional level. Reason for Services/Other Comments:  · Patient continues to require skilled intervention due to decreased functional mobility, balance impairments, and difficulty with walking. Use of outcome tool(s) and clinical judgement create a POC that gives a: Questionable prediction of patient's progress: MODERATE COMPLEXITY                 TREATMENT:      Pre-treatment Symptoms/Complaints:  Some ankle/foot pain  Pain: Initial:   Pain Intensity 1: 0  Post Session:  2/10      Therapeutic Exercise: (15 Minutes):  Exercises per grid below to improve mobility and strength. Required minimal verbal cues to promote proper body mechanics. Progressed repetitions and complexity of movement as indicated. Therapeutic Activity: (    10 minutes): Therapeutic activities including Bed transfers and Chair transfers to improve mobility, strength and balance. Required minimal   to promote static and dynamic balance in standing. Date:  1/28/17  AM Date:  1/28/17  PM Date:  1/29/17  AM   Activity/Exercise Parameters Parameters Parameters   Supine heel slides 15x B  15x B   Supine SAQ 10x B     Supine hip abd 10x B  15x B   Seated TKE 10x B 10x B manual resistance 20x B   Seated HS curls  10x B manual resistance    Seated marching  20x B 20x B   Supine SLR   10x B          Treatment/Session Assessment:    · Response to Treatment:  See above  · Interdisciplinary Collaboration:  · Physical Therapy Assistant and Registered Nurse  · After treatment position/precautions:  · Up in chair, Bed/Chair-wheels locked, Bed in low position, Call light within reach, RN notified and Family at bedside  · Compliance with Program/Exercises: Will assess as treatment progresses.   · Recommendations/Intent for next treatment session: \"Next visit will focus on advancements to more challenging activities and reduction in assistance provided\".   Total Treatment Duration:  PT Patient Time In/Time Out  Time In: 0950  Time Out: 1015     Edmond Britt PTA

## 2017-01-29 NOTE — PROGRESS NOTES
ORTH PROGRESS NOTE    2017  Admit Date:   2017    Post Op day: 3 Days Post-Op    Subjective:    Leetta Goltz doing well     PT/OT:   Gait:                    Vital Signs:    Patient Vitals for the past 8 hrs:   BP Temp Pulse Resp SpO2   17 0736 109/61 98.1 °F (36.7 °C) (!) 57 19 92 %   17 0406 113/56 98.7 °F (37.1 °C) (!) 50 17 91 %     Temp (24hrs), Av.2 °F (36.8 °C), Min:97.4 °F (36.3 °C), Max:98.7 °F (37.1 °C)      Pain Control:   Pain Assessment  Pain Scale 1: Numeric (0 - 10)  Pain Intensity 1: 0  Pain Onset 1: Post op  Pain Location 1: Ankle  Pain Orientation 1: Left  Pain Description 1: Aching  Pain Intervention(s) 1: Medication (see MAR)    Meds:    Current Facility-Administered Medications   Medication Dose Route Frequency    sodium chloride (NS) flush 5-10 mL  5-10 mL IntraVENous PRN    oxyCODONE IR (ROXICODONE) tablet 10 mg  10 mg Oral ONCE PRN    HYDROmorphone (PF) (DILAUDID) injection 0.5 mg  0.5 mg IntraVENous Multiple    promethazine (PHENERGAN) with saline injection 6.25 mg  6.25 mg IntraVENous Q15MIN PRN    aspirin chewable tablet 81 mg  81 mg Oral QHS    atorvastatin (LIPITOR) tablet 40 mg  40 mg Oral QHS    cholecalciferol (VITAMIN D3) tablet 2,000 Units  2,000 Units Oral DAILY    cyanocobalamin tablet 1,000 mcg  1,000 mcg Oral DAILY    hydroCHLOROthiazide (HYDRODIURIL) tablet 25 mg  25 mg Oral DAILY    losartan (COZAAR) tablet 100 mg  100 mg Oral DAILY    multivitamin, stress formula (STRESS TAB) tablet 1 Tab  1 Tab Oral DAILY    mirabegron ER (MYRBETRIQ) tablet 50 mg  50 mg Oral DAILY    PHENobarbital (LUMINAL) tablet 97.2 mg  97.2 mg Oral QHS    pregabalin (LYRICA) capsule 150 mg  150 mg Oral BID    propranolol LA (INDERAL LA) capsule 120 mg  120 mg Oral DAILY    venlafaxine-SR (EFFEXOR-XR) capsule 75 mg  75 mg Oral DAILY    sodium chloride (NS) flush 5-10 mL  5-10 mL IntraVENous Q8H    sodium chloride (NS) flush 5-10 mL  5-10 mL IntraVENous PRN    oxyCODONE IR (ROXICODONE) tablet 5 mg  5 mg Oral Q4H PRN    HYDROmorphone (PF) (DILAUDID) injection 1 mg  1 mg IntraVENous Q4H PRN    naloxone (NARCAN) injection 0.4 mg  0.4 mg IntraVENous PRN    ondansetron (ZOFRAN) injection 4 mg  4 mg IntraVENous Q4H PRN    enoxaparin (LOVENOX) injection 40 mg  40 mg SubCUTAneous Q24H       LAB:    No results for input(s): HCT, HCTEXT, HGB, HGBEXT, INR, HCTEXT, HGBEXT in the last 72 hours.     No lab exists for component: INREXT, INREXT    24 Hour Assessment Issues:    Oriented    Transfuse PRBC's:      Assessment & Physician's Comment:  Dressing is clean, dry, and intact  Neurovascular checks within normal limits    Active Problems:    Tibialis tendinitis (3/12/2015)        Plan:  Rehab Monday  [facility] for BM    Vitor Nuñez MD

## 2017-01-30 PROCEDURE — 97150 GROUP THERAPEUTIC PROCEDURES: CPT

## 2017-01-30 PROCEDURE — 65270000029 HC RM PRIVATE

## 2017-01-30 PROCEDURE — 74011250637 HC RX REV CODE- 250/637: Performed by: ORTHOPAEDIC SURGERY

## 2017-01-30 PROCEDURE — 97116 GAIT TRAINING THERAPY: CPT

## 2017-01-30 PROCEDURE — 74011250636 HC RX REV CODE- 250/636: Performed by: ORTHOPAEDIC SURGERY

## 2017-01-30 RX ORDER — PREGABALIN 150 MG/1
150 CAPSULE ORAL 2 TIMES DAILY
Qty: 30 CAP | Refills: 0 | Status: SHIPPED | OUTPATIENT
Start: 2017-01-30 | End: 2017-07-24 | Stop reason: CLARIF

## 2017-01-30 RX ORDER — ENOXAPARIN SODIUM 100 MG/ML
40 INJECTION SUBCUTANEOUS EVERY 24 HOURS
Qty: 20 ML | Refills: 0 | Status: SHIPPED
Start: 2017-01-30 | End: 2017-04-04

## 2017-01-30 RX ORDER — OXYCODONE HYDROCHLORIDE 5 MG/1
5 TABLET ORAL
Qty: 29 TAB | Refills: 0 | Status: SHIPPED | OUTPATIENT
Start: 2017-01-30 | End: 2017-04-04

## 2017-01-30 RX ORDER — PHENOBARBITAL 97.2 MG/1
97.2 TABLET ORAL
Qty: 30 TAB | Refills: 0 | Status: SHIPPED | OUTPATIENT
Start: 2017-01-30

## 2017-01-30 RX ADMIN — CYANOCOBALAMIN TAB 1000 MCG 1000 MCG: 1000 TAB at 08:31

## 2017-01-30 RX ADMIN — Medication 5 ML: at 14:00

## 2017-01-30 RX ADMIN — PREGABALIN 150 MG: 150 CAPSULE ORAL at 17:29

## 2017-01-30 RX ADMIN — VITAMIN D, TAB 1000IU (100/BT) 2000 UNITS: 25 TAB at 08:32

## 2017-01-30 RX ADMIN — ATORVASTATIN CALCIUM 40 MG: 40 TABLET, FILM COATED ORAL at 22:55

## 2017-01-30 RX ADMIN — B-COMPLEX W/ C & FOLIC ACID TAB 1 TABLET: TAB at 08:35

## 2017-01-30 RX ADMIN — PROPRANOLOL HYDROCHLORIDE 120 MG: 60 CAPSULE, EXTENDED RELEASE ORAL at 08:32

## 2017-01-30 RX ADMIN — VENLAFAXINE HYDROCHLORIDE 75 MG: 75 CAPSULE, EXTENDED RELEASE ORAL at 08:32

## 2017-01-30 RX ADMIN — ENOXAPARIN SODIUM 40 MG: 40 INJECTION SUBCUTANEOUS at 17:29

## 2017-01-30 RX ADMIN — PHENOBARBITAL 97.2 MG: 32.4 TABLET ORAL at 22:55

## 2017-01-30 RX ADMIN — ASPIRIN 81 MG CHEWABLE TABLET 81 MG: 81 TABLET CHEWABLE at 22:55

## 2017-01-30 RX ADMIN — PREGABALIN 150 MG: 150 CAPSULE ORAL at 08:32

## 2017-01-30 RX ADMIN — LOSARTAN POTASSIUM 100 MG: 50 TABLET ORAL at 08:31

## 2017-01-30 RX ADMIN — Medication 10 ML: at 05:12

## 2017-01-30 RX ADMIN — Medication 10 ML: at 22:57

## 2017-01-30 RX ADMIN — HYDROCHLOROTHIAZIDE 25 MG: 25 TABLET ORAL at 08:31

## 2017-01-30 NOTE — PROGRESS NOTES
Problem: Mobility Impaired (Adult and Pediatric)  Goal: *Acute Goals and Plan of Care (Insert Text)  STG:  (1.)Mr. John will move from supine to sit and sit to supine , scoot up and down and roll side to side with STAND BY ASSIST within 3 day(s). MET 1/29  (2.)Mr. John will transfer from bed to chair and chair to bed with MINIMAL ASSIST using the least restrictive device within 3 day(s). MET 1/29  (3.)Mr. John will ambulate with MAXIMAL ASSIST for 10 feet with the least restrictive device within 3 day(s). (4.)Mr. John will participate in therapeutic activity/exerices x 15 minutes for increased strength within 3 days. MET 1/28    LTG:  (1.)Mr. John will move from supine to sit and sit to supine , scoot up and down and roll side to side in bed with INDEPENDENCE within 7 day(s). (2.)Mr. John will transfer from bed to chair and chair to bed with STAND BY ASSIST using the least restrictive device within 7 day(s). (3.)Mr. John will ambulate with CONTACT GUARD ASSIST for 30+ feet with the least restrictive device within 7 day(s). (4.)Mr. John will participate in therapeutic activity/exerices x 20 minutes for increased strength within 7 days.     ________________________________________________________________________________________________      PHYSICAL THERAPY: Daily Note, Treatment Day: 3rd and AM 1/30/2017  INPATIENT: Hospital Day: 5  [place] / [place] / Product Type: Medicare /    L LE NWB     NAME/AGE/GENDER: Ekaterina Perez is a 70 y.o. male        PRIMARY DIAGNOSIS: Posterior tibial tendinitis of left leg [M76.822] <principal problem not specified> <principal problem not specified>  Procedure(s) (LRB):  LEFT ANKLE ARTHRODESIS TRIPLE  (Left)  LEFT ANKLE HARDWARE REMOVAL/ HEEL (Left)  LEFT ACHILLES LENGTHENING  (Left)  4 Days Post-Op  ICD-10: Treatment Diagnosis:       · Generalized Muscle Weakness (M62.81)  · Difficulty in walking, Not elsewhere classified (R26.2)  · Other abnormalities of gait and mobility (R26.89)   Precaution/Allergies:  Adhesive and Bactrim [sulfamethoxazole-trimethoprim]       ASSESSMENT:      Mr. Daryle Grew is a pleasant 70 y.o. male s/p above who is NWB on L LE. Patient was supine upon contact and agreeable to PT. Patient is making great progress towards physical therapy goals. Patient able to perform gait training x 10' with use of rolling walker, CGA, and below cues in gait section as well as encouragement to increase distance. patient was later rolled to therapy gym to participate in group therapeutic strengthening exercises to improve functional strength for transfers, gait and mobility. Patient requires occasional cues to perform exercises correctly. Overall great progress. Will continue efforts. This section established at most recent assessment   PROBLEM LIST (Impairments causing functional limitations):  1. Decreased Strength  2. Decreased Transfer Abilities  3. Decreased Ambulation Ability/Technique  4. Decreased Balance  5. Decreased Activity Tolerance    INTERVENTIONS PLANNED: (Benefits and precautions of physical therapy have been discussed with the patient.)  1. Balance Exercise  2. Bed Mobility  3. Family Education  4. Gait Training  5. Neuromuscular Re-education/Strengthening  6. Therapeutic Activites  7. Therapeutic Exercise/Strengthening  8. Transfer Training  9. Group Therapy      TREATMENT PLAN: Frequency/Duration: twice daily for duration of hospital stay  Rehabilitation Potential For Stated Goals: GOOD      RECOMMENDED REHABILITATION/EQUIPMENT: (at time of discharge pending progress): Continue Skilled Therapy.                    HISTORY:   History of Present Injury/Illness (Reason for Referral):  Per pt chart: Review of Systems / Medical History  Patient summary reviewed and pertinent labs reviewed     Pulmonary  Sleep apnea: CPAP Neuro/Psych   seizures (last one 20 years ago): well controlled Cardiovascular  Hypertension: well controlled  Exercise tolerance: >4 METS  Comments: thromboembolus   GI/Hepatic/Renal  Renal disease (bladder bleeding last year) Endo/Other  Morbid obesity Other Findings                Physical Exam     Airway  Mallampati: III  TM Distance: 4 - 6 cm  Neck ROM: normal range of motion   Mouth opening: Normal Cardiovascular  Regular rate and rhythm, S1 and S2 normal, no murmur, click, rub, or gallop Dental   Pulmonary  Breath sounds clear to auscultation Abdominal Other Findings          Past Medical History/Comorbidities:   Mr. Erasmo Brown  has a past medical history of Anxiety; Cancer ([facility] [address] (2010); Chronic pain; Hyperlipidemia; Hypertension; Morbid obesity ([facility] [address] Osteoarthritis; Personal history of prostate cancer; Seizures ([facility] [address] (first one 30's); Sleep apnea; and Thromboembolus ([facility] [address] (~2009). Mr. Erasmo Brown  has a past surgical history that includes bladder suspension; other surgical; knee arthroscopy; knee replacement; orthopaedic; prostatectomy (12/2010); and cataract removal (Bilateral). Social History/Living Environment:   Home Environment: Private residence  # Steps to Enter: 3  Rails to Enter: Yes  Hand Rails : Left  One/Two Story Residence: One story  Living Alone: No  Support Systems: Spouse/Significant Other/Partner  Patient Expects to be Discharged to[de-identified] Rehabilitation facility  Current DME Used/Available at Home: Cane, straight, Raised toilet seat, Shower chair, Commode, bedside  Tub or Shower Type: Tub/Shower combination  Prior Level of Function/Work/Activity:  Pt reports he lives in a one story home with his wife that has 3 steps to enter. He states that he uses a cane for ambulation but is independent with his ADLs.       Number of Personal Factors/Comorbidities that affect the Plan of Care:  · Anxiety  · Cancer  · Morbid obesity 3+: HIGH COMPLEXITY   EXAMINATION:   Most Recent Physical Functioning:   Gross Assessment:                  Posture:     Balance:  Sitting: Intact  Standing: Impaired  Standing - Static: Fair  Standing - Dynamic : Fair Bed Mobility:  Supine to Sit: Modified independent  Sit to Supine:  (NT)  Wheelchair Mobility:     Transfers:  Sit to Stand: Stand-by asssistance  Stand to Sit: Stand-by asssistance  Bed to Chair: Contact guard assistance  Gait:  Left Side Weight Bearing: Non-weight bearing  Base of Support: Narrowed  Speed/Linn: Slow  Step Length: Right shortened;Left shortened  Gait Abnormalities: Decreased step clearance;Trunk sway increased; Step to gait  Distance (ft): 10 Feet (ft)  Assistive Device: Walker, rolling  Ambulation - Level of Assistance: Contact guard assistance  Interventions: Safety awareness training; Tactile cues; Verbal cues  Duration: 10 Minutes       Body Structures Involved:  1. Nerves  2. Bones  3. Joints  4. Muscles Body Functions Affected:  1. Sensory/Pain  2. Neuromusculoskeletal  3. Movement Related Activities and Participation Affected:  1. General Tasks and Demands  2. Mobility  3. Domestic Life   Number of elements that affect the Plan of Care: 4+: HIGH COMPLEXITY   CLINICAL PRESENTATION:   Presentation: Evolving clinical presentation with changing clinical characteristics: MODERATE COMPLEXITY   CLINICAL DECISION MAKIN [facility] Inpatient Short Form  How much difficulty does the patient currently have. .. Unable A Lot A Little None   1. Turning over in bed (including adjusting bedclothes, sheets and blankets)? [ ] 1   [ ] 2   [ ] 3   [X] 4   2. Sitting down on and standing up from a chair with arms ( e.g., wheelchair, bedside commode, etc.)   [ ] 1   [X] 2   [ ] 3   [ ] 4   3. Moving from lying on back to sitting on the side of the bed? [ ] 1   [ ] 2   [X] 3   [ ] 4   How much help from another person does the patient currently need. .. Total A Lot A Little None   4. Moving to and from a bed to a chair (including a wheelchair)? [ ] 1   [ ] 2   [X] 3   [ ] 4   5. Need to walk in hospital room?    [ ] 1 [X] 2   [ ] 3   [ ] 4   6. Climbing 3-5 steps with a railing? [X] 1   [ ] 2   [ ] 3   [ ] 4   © 2007, Trustees of [address] [place] [address] [address] [id number], under license to [employer]. All rights reserved    Score:  Initial: 15 Most Recent: X (Date: -- )     Interpretation of Tool:  Represents activities that are increasingly more difficult (i.e. Bed mobility, Transfers, Gait). Score 24 23 22-20 19-15 14-10 9-7 6       Modifier CH CI CJ CK CL CM CN         · Mobility - Walking and Moving Around:               - CURRENT STATUS:    CK - 40%-59% impaired, limited or restricted               - GOAL STATUS:           CJ - 20%-39% impaired, limited or restricted               - D/C STATUS:                       ---------------To be determined---------------  [place] / [place] / Product Type: Medicare /       Medical Necessity:     · Patient demonstrates good rehab potential due to higher previous functional level. Reason for Services/Other Comments:  · Patient continues to require skilled intervention due to decreased functional mobility, balance impairments, and difficulty with walking. Use of outcome tool(s) and clinical judgement create a POC that gives a: Questionable prediction of patient's progress: MODERATE COMPLEXITY                 TREATMENT:      Pre-treatment Symptoms/Complaints:  Some ankle/foot pain  Pain: Initial:   Pain Intensity 1: 0 (before and after treatment)  Post Session:  0/10      Gait Training (10 Minutes):  Gait training to improve and/or restore physical functioning as related to mobility, strength and balance. Ambulated 10 Feet (ft) with Contact guard assistance using a Walker, rolling and moderate Safety awareness training; Tactile cues; Verbal cues related to their sequencing, walker manipulation, posture, step length, foot placement, and UE support on rolling walker to promote proper body alignment, promote proper body posture and promote proper body mechanics. Instruction in performance of the above deficits to correct overall gait quality and functional mobility. Therapeutic Exercise: ( 10 Minutes):  Exercises per grid below to improve mobility, strength and balance. Required moderate visual, verbal and tactile cues to promote proper body alignment, promote proper body posture and promote proper body mechanics. Progressed range, repetitions and complexity of movement as indicated. Date:  1/30/17 Date:   Date:     ACTIVITY/EXERCISE AM PM AM PM AM PM   Ambulation:           Distance  Device  Duration         Seated Heel Raises x15R A        Seated Toe Raises x15R A        Seated Long Arc Quads x15B A        Seated Marching x15B A        Seated Hip Abduction x15B A        Wiggles toes on LLE 3x30 seconds        B = bilateral; AA = active assistive; A = active; P = passive           Treatment/Session Assessment:    · Response to Treatment:  See above  · Interdisciplinary Collaboration:  · Physical Therapy Assistant and Registered Nurse  · After treatment position/precautions:  · Up in chair, Bed/Chair-wheels locked, Call light within reach, RN notified and Family at bedside  · Compliance with Program/Exercises: Will assess as treatment progresses. · Recommendations/Intent for next treatment session: \"Next visit will focus on advancements to more challenging activities and reduction in assistance provided\".   Total Treatment Duration:  PT Patient Time In/Time Out  Time In: 0922 (7935)  Time Out: 0936 (3999)     Erna Adkins, PTA

## 2017-01-30 NOTE — DISCHARGE SUMMARY
Discharge Summary      Patient ID:  Dominick Mi  [id number]  54 y.o.  1945    Admit date: 1/26/2017    Discharge date and time: No discharge date for patient encounter. Admitting Physician: Abdiaziz Zaman MD     Discharge Physician: same    Admission Diagnoses: Posterior tibial tendinitis of left leg [M76.822]    Discharge Diagnoses:  Active Problems:    Tibialis tendinitis (3/12/2015)        Surgeon: Aden Coppola    Preoperative Medical Clearance: n/a                          Perioperative Antibiotics: Ancef  ___                                                    Postoperative Pain Management:  Oxycodone    DVT Prophylaxis:                                  Lovenox                                                                 Plexi-Pulse                Discharged to: SNF    Discharge instructions:  - Anticoagulate with: lovenox  -Resume pre hospital diet             -Resume home medications per medical continuation form     -Ambulate with walker,   -Follow up in office as scheduled       Signed:  Abdiaziz Zaman MD  1/30/2017  11:08 AM

## 2017-01-30 NOTE — PROGRESS NOTES
ORTH FRACTURE PROGRESS NOTE    2017  Admit Date:   2017    Post Op day: 4 Days Post-Op    Subjective:    Ellen John     PT/OT:   Gait:                    Vital Signs:    Patient Vitals for the past 8 hrs:   BP Temp Pulse Resp SpO2   17 0825 126/68 98.1 °F (36.7 °C) (!) 57 18 96 %   17 0514 127/69 98.4 °F (36.9 °C) (!) 54 18 93 %     Temp (24hrs), Av.2 °F (36.8 °C), Min:97.7 °F (36.5 °C), Max:98.6 °F (37 °C)      Pain Control:   Pain Assessment  Pain Scale 1: Numeric (0 - 10)  Pain Intensity 1: 0  Pain Onset 1: Post op  Pain Location 1: Ankle  Pain Orientation 1: Left  Pain Description 1: Aching  Pain Intervention(s) 1: Medication (see MAR)    Meds:    Current Facility-Administered Medications   Medication Dose Route Frequency    magnesium hydroxide (MILK OF MAGNESIA) 400 mg/5 mL oral suspension 30 mL  30 mL Oral DAILY PRN    sodium chloride (NS) flush 5-10 mL  5-10 mL IntraVENous PRN    oxyCODONE IR (ROXICODONE) tablet 10 mg  10 mg Oral ONCE PRN    HYDROmorphone (PF) (DILAUDID) injection 0.5 mg  0.5 mg IntraVENous Multiple    promethazine (PHENERGAN) with saline injection 6.25 mg  6.25 mg IntraVENous Q15MIN PRN    aspirin chewable tablet 81 mg  81 mg Oral QHS    atorvastatin (LIPITOR) tablet 40 mg  40 mg Oral QHS    cholecalciferol (VITAMIN D3) tablet 2,000 Units  2,000 Units Oral DAILY    cyanocobalamin tablet 1,000 mcg  1,000 mcg Oral DAILY    hydroCHLOROthiazide (HYDRODIURIL) tablet 25 mg  25 mg Oral DAILY    losartan (COZAAR) tablet 100 mg  100 mg Oral DAILY    multivitamin, stress formula (STRESS TAB) tablet 1 Tab  1 Tab Oral DAILY    mirabegron ER (MYRBETRIQ) tablet 50 mg  50 mg Oral DAILY    PHENobarbital (LUMINAL) tablet 97.2 mg  97.2 mg Oral QHS    pregabalin (LYRICA) capsule 150 mg  150 mg Oral BID    propranolol LA (INDERAL LA) capsule 120 mg  120 mg Oral DAILY    venlafaxine-SR (EFFEXOR-XR) capsule 75 mg  75 mg Oral DAILY    sodium chloride (NS) flush 5-10 mL  5-10 mL IntraVENous Q8H    sodium chloride (NS) flush 5-10 mL  5-10 mL IntraVENous PRN    oxyCODONE IR (ROXICODONE) tablet 5 mg  5 mg Oral Q4H PRN    HYDROmorphone (PF) (DILAUDID) injection 1 mg  1 mg IntraVENous Q4H PRN    naloxone (NARCAN) injection 0.4 mg  0.4 mg IntraVENous PRN    ondansetron (ZOFRAN) injection 4 mg  4 mg IntraVENous Q4H PRN    enoxaparin (LOVENOX) injection 40 mg  40 mg SubCUTAneous Q24H       LAB:    No results for input(s): HCT, HCTEXT, HGB, HGBEXT, INR, HCTEXT, HGBEXT in the last 72 hours. No lab exists for component: INREXT, INREXT    24 Hour Assessment Issues:    Oriented    Discharge Planning: SNF    Transfuse PRBC's:      Assessment & Physician's Comment:  Neurovascular checks within normal limits    Active Problems:    Tibialis tendinitis (3/12/2015)        Plan:   To snf  Cont PT      Elida Dunn MD

## 2017-01-30 NOTE — PROGRESS NOTES
Problem: Interdisciplinary Rounds  Goal: Interdisciplinary Rounds  Outcome: Progressing Towards Goal  Interdisciplinary team rounds were held 1/30/2017 with the following team members:Care Management, Pastoral Care, Physical Therapy, Physician and . Anticipate discharge to [facility] tomorrow. Plan of care discussed. See clinical pathway and/or care plan for interventions and desired outcomes.

## 2017-01-30 NOTE — PROGRESS NOTES
Problem: Mobility Impaired (Adult and Pediatric)  Goal: *Acute Goals and Plan of Care (Insert Text)  STG:  (1.)Mr. John will move from supine to sit and sit to supine , scoot up and down and roll side to side with STAND BY ASSIST within 3 day(s). MET 1/29  (2.)Mr. John will transfer from bed to chair and chair to bed with MINIMAL ASSIST using the least restrictive device within 3 day(s). MET 1/29  (3.)Mr. John will ambulate with MAXIMAL ASSIST for 10 feet with the least restrictive device within 3 day(s). (4.)Mr. John will participate in therapeutic activity/exerices x 15 minutes for increased strength within 3 days. MET 1/28    LTG:  (1.)Mr. John will move from supine to sit and sit to supine , scoot up and down and roll side to side in bed with INDEPENDENCE within 7 day(s). (2.)Mr. John will transfer from bed to chair and chair to bed with STAND BY ASSIST using the least restrictive device within 7 day(s). (3.)Mr. John will ambulate with CONTACT GUARD ASSIST for 30+ feet with the least restrictive device within 7 day(s). (4.)Mr. John will participate in therapeutic activity/exerices x 20 minutes for increased strength within 7 days.     ________________________________________________________________________________________________      PHYSICAL THERAPY: Daily Note, Treatment Day: 3rd and PM 1/30/2017  INPATIENT: Hospital Day: 5  [place] / [place] / Product Type: Medicare /    L LE NWB     NAME/AGE/GENDER: Steph Rios is a 70 y.o. male        PRIMARY DIAGNOSIS: Posterior tibial tendinitis of left leg [M76.822] <principal problem not specified> <principal problem not specified>  Procedure(s) (LRB):  LEFT ANKLE ARTHRODESIS TRIPLE  (Left)  LEFT ANKLE HARDWARE REMOVAL/ HEEL (Left)  LEFT ACHILLES LENGTHENING  (Left)  4 Days Post-Op  ICD-10: Treatment Diagnosis:       · Generalized Muscle Weakness (M62.81)  · Difficulty in walking, Not elsewhere classified (R26.2)  · Other abnormalities of gait and mobility (R26.89)   Precaution/Allergies:  Adhesive and Bactrim [sulfamethoxazole-trimethoprim]       ASSESSMENT:      Mr. Arlene Melendez is a pleasant 70 y.o. male s/p above who is NWB on L LE. Patient was sitting up in recliner chair  upon contact and agreeable to PT. Patient is making great progress towards physical therapy goals. Patient able to perform gait training x 12' with use of rolling walker, CGA, and below cues in gait section. Patient returns to EOB and to supine with mod I. Overall great progress. Will continue efforts. This section established at most recent assessment   PROBLEM LIST (Impairments causing functional limitations):  1. Decreased Strength  2. Decreased Transfer Abilities  3. Decreased Ambulation Ability/Technique  4. Decreased Balance  5. Decreased Activity Tolerance    INTERVENTIONS PLANNED: (Benefits and precautions of physical therapy have been discussed with the patient.)  1. Balance Exercise  2. Bed Mobility  3. Family Education  4. Gait Training  5. Neuromuscular Re-education/Strengthening  6. Therapeutic Activites  7. Therapeutic Exercise/Strengthening  8. Transfer Training  9. Group Therapy      TREATMENT PLAN: Frequency/Duration: twice daily for duration of hospital stay  Rehabilitation Potential For Stated Goals: GOOD      RECOMMENDED REHABILITATION/EQUIPMENT: (at time of discharge pending progress): Continue Skilled Therapy.                    HISTORY:   History of Present Injury/Illness (Reason for Referral):  Per pt chart: Review of Systems / Medical History  Patient summary reviewed and pertinent labs reviewed     Pulmonary  Sleep apnea: CPAP Neuro/Psych   seizures (last one 20 years ago): well controlled Cardiovascular  Hypertension: well controlled  Exercise tolerance: >4 METS  Comments: thromboembolus   GI/Hepatic/Renal  Renal disease (bladder bleeding last year) Endo/Other  Morbid obesity Other Findings                Physical Exam Airway  Mallampati: III  TM Distance: 4 - 6 cm  Neck ROM: normal range of motion   Mouth opening: Normal Cardiovascular  Regular rate and rhythm, S1 and S2 normal, no murmur, click, rub, or gallop Dental   Pulmonary  Breath sounds clear to auscultation Abdominal Other Findings          Past Medical History/Comorbidities:   Mr. Idalmis Arreola  has a past medical history of Anxiety; Cancer ([facility] [address] (2010); Chronic pain; Hyperlipidemia; Hypertension; Morbid obesity ([facility] [address] Osteoarthritis; Personal history of prostate cancer; Seizures ([facility] [address] (first one 30's); Sleep apnea; and Thromboembolus ([facility] [address] (~2009). Mr. Idalmis Arreola  has a past surgical history that includes bladder suspension; other surgical; knee arthroscopy; knee replacement; orthopaedic; prostatectomy (12/2010); and cataract removal (Bilateral). Social History/Living Environment:   Home Environment: Private residence  # Steps to Enter: 3  Rails to Enter: Yes  Hand Rails : Left  One/Two Story Residence: One story  Living Alone: No  Support Systems: Spouse/Significant Other/Partner  Patient Expects to be Discharged to[de-identified] Rehabilitation facility  Current DME Used/Available at Home: Cane, straight, Raised toilet seat, Shower chair, Commode, bedside  Tub or Shower Type: Tub/Shower combination  Prior Level of Function/Work/Activity:  Pt reports he lives in a one story home with his wife that has 3 steps to enter. He states that he uses a cane for ambulation but is independent with his ADLs.       Number of Personal Factors/Comorbidities that affect the Plan of Care:  · Anxiety  · Cancer  · Morbid obesity 3+: HIGH COMPLEXITY   EXAMINATION:   Most Recent Physical Functioning:   Gross Assessment:                  Posture:     Balance:  Sitting: Intact  Standing: Impaired  Standing - Static: Fair  Standing - Dynamic : Fair Bed Mobility:  Supine to Sit: Modified independent  Sit to Supine: Modified independent  Wheelchair Mobility:     Transfers:  Sit to Stand: Stand-by asssistance  Stand to Sit: Stand-by asssistance  Bed to Chair: Contact guard assistance  Gait:  Left Side Weight Bearing: Non-weight bearing  Base of Support: Narrowed  Speed/Linn: Slow  Step Length: Right shortened;Left shortened  Gait Abnormalities: Decreased step clearance;Trunk sway increased; Step to gait  Distance (ft): 12 Feet (ft)  Assistive Device: Walker, rolling  Ambulation - Level of Assistance: Contact guard assistance  Interventions: Safety awareness training; Tactile cues; Verbal cues  Duration: 10 Minutes       Body Structures Involved:  1. Nerves  2. Bones  3. Joints  4. Muscles Body Functions Affected:  1. Sensory/Pain  2. Neuromusculoskeletal  3. Movement Related Activities and Participation Affected:  1. General Tasks and Demands  2. Mobility  3. Domestic Life   Number of elements that affect the Plan of Care: 4+: HIGH COMPLEXITY   CLINICAL PRESENTATION:   Presentation: Evolving clinical presentation with changing clinical characteristics: MODERATE COMPLEXITY   CLINICAL DECISION MAKIN [facility] Mobility Inpatient Short Form  How much difficulty does the patient currently have. .. Unable A Lot A Little None   1. Turning over in bed (including adjusting bedclothes, sheets and blankets)? [ ] 1   [ ] 2   [ ] 3   [X] 4   2. Sitting down on and standing up from a chair with arms ( e.g., wheelchair, bedside commode, etc.)   [ ] 1   [X] 2   [ ] 3   [ ] 4   3. Moving from lying on back to sitting on the side of the bed? [ ] 1   [ ] 2   [X] 3   [ ] 4   How much help from another person does the patient currently need. .. Total A Lot A Little None   4. Moving to and from a bed to a chair (including a wheelchair)? [ ] 1   [ ] 2   [X] 3   [ ] 4   5. Need to walk in hospital room? [ ] 1   [X] 2   [ ] 3   [ ] 4   6. Climbing 3-5 steps with a railing? [X] 1   [ ] 2   [ ] 3   [ ] 4   © , Trustees of [address] [place] [address] [address] [address], under license to [employer].  All rights reserved    Score:  Initial: 15 Most Recent: X (Date: -- )     Interpretation of Tool:  Represents activities that are increasingly more difficult (i.e. Bed mobility, Transfers, Gait). Score 24 23 22-20 19-15 14-10 9-7 6       Modifier CH CI CJ CK CL CM CN         · Mobility - Walking and Moving Around:               - CURRENT STATUS:    CK - 40%-59% impaired, limited or restricted               - GOAL STATUS:           CJ - 20%-39% impaired, limited or restricted               - D/C STATUS:                       ---------------To be determined---------------  [place] / [place] / Product Type: Medicare /       Medical Necessity:     · Patient demonstrates good rehab potential due to higher previous functional level. Reason for Services/Other Comments:  · Patient continues to require skilled intervention due to decreased functional mobility, balance impairments, and difficulty with walking. Use of outcome tool(s) and clinical judgement create a POC that gives a: Questionable prediction of patient's progress: MODERATE COMPLEXITY                 TREATMENT:      Pre-treatment Symptoms/Complaints:  Some ankle/foot pain  Pain: Initial:   Pain Intensity 1: 0 (before and after treatment)  Post Session:  0/10      Gait Training (10 Minutes):  Gait training to improve and/or restore physical functioning as related to mobility, strength and balance. Ambulated 12 Feet (ft) with Contact guard assistance using a Walker, rolling and moderate Safety awareness training; Tactile cues; Verbal cues related to their sequencing, walker manipulation, posture, step length, foot placement, and UE support on rolling walker to promote proper body alignment, promote proper body posture and promote proper body mechanics. Instruction in performance of the above deficits to correct overall gait quality and functional mobility.   Therapeutic Exercise: (  Minutes):  Exercises per grid below to improve mobility, strength and balance. Required moderate visual, verbal and tactile cues to promote proper body alignment, promote proper body posture and promote proper body mechanics. Progressed range, repetitions and complexity of movement as indicated. Date:  1/30/17 Date:   Date:     ACTIVITY/EXERCISE AM PM AM PM AM PM   Ambulation:           Distance  Device  Duration         Seated Heel Raises x15R A        Seated Toe Raises x15R A        Seated Long Arc Quads x15B A        Seated Marching x15B A        Seated Hip Abduction x15B A        Wiggles toes on LLE 3x30 seconds        B = bilateral; AA = active assistive; A = active; P = passive           Treatment/Session Assessment:    · Response to Treatment:  See above  · Interdisciplinary Collaboration:  · Physical Therapy Assistant and Registered Nurse  · After treatment position/precautions:  · Supine in bed, Bed/Chair-wheels locked, Bed in low position, Call light within reach, RN notified and Family at bedside  · Compliance with Program/Exercises: Will assess as treatment progresses. · Recommendations/Intent for next treatment session: \"Next visit will focus on advancements to more challenging activities and reduction in assistance provided\".   Total Treatment Duration:  PT Patient Time In/Time Out  Time In: 1300  Time Out: Paxton Leonardo PTA

## 2017-01-30 NOTE — PROGRESS NOTES
No beds available at [facility]. Referral submitted to [facility] and pt accepted for admission for Tuesday if he is medically cleared for discharge. SW notified pt/family of bed offer and plan. MD also aware bed is not available for today. SW following to facilitate pt's transfer to rehab at discharge. Care Management Interventions  PCP Verified by CM:  Yes  Mode of Transport at Discharge: BLS  Transition of Care Consult (CM Consult): Discharge Planning  Physical Therapy Consult: Yes  Current Support Network: Lives with Spouse, Own Home  Confirm Follow Up Transport: Family  Plan discussed with Pt/Family/Caregiver: Yes  Freedom of Choice Offered: Yes  Discharge Location  Discharge Placement: Rehab Unit Subacute ([facility])

## 2017-01-31 VITALS
HEART RATE: 50 BPM | OXYGEN SATURATION: 95 % | WEIGHT: 285 LBS | RESPIRATION RATE: 17 BRPM | TEMPERATURE: 98 F | SYSTOLIC BLOOD PRESSURE: 140 MMHG | DIASTOLIC BLOOD PRESSURE: 70 MMHG | BODY MASS INDEX: 38.65 KG/M2

## 2017-01-31 PROCEDURE — 74011250637 HC RX REV CODE- 250/637: Performed by: ORTHOPAEDIC SURGERY

## 2017-01-31 RX ADMIN — Medication 10 ML: at 06:19

## 2017-01-31 RX ADMIN — HYDROCHLOROTHIAZIDE 25 MG: 25 TABLET ORAL at 08:26

## 2017-01-31 RX ADMIN — PREGABALIN 150 MG: 150 CAPSULE ORAL at 08:26

## 2017-01-31 RX ADMIN — PROPRANOLOL HYDROCHLORIDE 120 MG: 60 CAPSULE, EXTENDED RELEASE ORAL at 08:26

## 2017-01-31 RX ADMIN — LOSARTAN POTASSIUM 100 MG: 50 TABLET ORAL at 08:26

## 2017-01-31 RX ADMIN — VENLAFAXINE HYDROCHLORIDE 75 MG: 75 CAPSULE, EXTENDED RELEASE ORAL at 08:26

## 2017-01-31 RX ADMIN — B-COMPLEX W/ C & FOLIC ACID TAB 1 TABLET: TAB at 08:25

## 2017-01-31 RX ADMIN — CYANOCOBALAMIN TAB 1000 MCG 1000 MCG: 1000 TAB at 08:25

## 2017-01-31 RX ADMIN — VITAMIN D, TAB 1000IU (100/BT) 2000 UNITS: 25 TAB at 08:26

## 2017-01-31 NOTE — PROGRESS NOTES
Pt medically cleared for discharge today and transferred to [facility] for STR services. Orders faxed to facility prior to discharge and originals were forwarded to facility by [facility][1] Ambulance transport staff. Pt notified family of his discharge and transfer.

## 2017-01-31 NOTE — PROGRESS NOTES
TRANSFER - OUT REPORT:    Verbal report given to MARIMAR Castillo(name) on [address]  being transferred to [facility](unit) for routine progression of care       Report consisted of patients Situation, Background, Assessment and   Recommendations(SBAR). Information from the following report(s) SBAR, [place], [place] and Recent Results was reviewed with the receiving nurse. Lines:   Peripheral IV 01/27/17 Right Forearm (Active)   Site Assessment Clean, dry, & intact 1/31/2017  3:53 AM   Phlebitis Assessment 0 1/31/2017  3:53 AM   Infiltration Assessment 0 1/31/2017  3:53 AM   Dressing Status Clean, dry, & intact 1/31/2017  3:53 AM   Dressing Type Tape;Transparent 1/31/2017  3:53 AM   Hub Color/Line Status Capped 1/31/2017  3:53 AM   Action Taken Open ports on tubing capped 1/31/2017  3:53 AM   Alcohol Cap Used Yes 1/31/2017  3:53 AM        Opportunity for questions and clarification was provided.       Patient transported with:   [employer]

## 2017-05-05 ENCOUNTER — HOSPITAL ENCOUNTER (OUTPATIENT)
Dept: PHYSICAL THERAPY | Age: 72
Discharge: HOME | End: 2017-05-05
Payer: MEDICARE

## 2017-05-05 PROCEDURE — 97162 PT EVAL MOD COMPLEX 30 MIN: CPT

## 2017-05-05 PROCEDURE — G8978 MOBILITY CURRENT STATUS: HCPCS

## 2017-05-05 PROCEDURE — G8979 MOBILITY GOAL STATUS: HCPCS

## 2017-05-05 PROCEDURE — 97012 MECHANICAL TRACTION THERAPY: CPT

## 2017-05-05 NOTE — PROGRESS NOTES
Ambulatory/Rehab Services H2 Model Falls Risk Assessment    Risk Factor Pts. ·   Confusion/Disorientation/Impulsivity  []    4 ·   Symptomatic Depression  []   2 ·   Altered Elimination  []   1 ·   Dizziness/Vertigo  []   1 ·   Gender (Male)  [x]   1 ·   Any administered antiepileptics (anticonvulsants):  []   2 ·   Any administered benzodiazepines:  []   1 ·   Visual Impairment (specify):  []   1 ·   Portable Oxygen Use  []   1 ·   Orthostatic ? BP  []   1 ·   History of Recent Falls (within 3 mos.)  []   5     Ability to Rise from Chair (choose one) Pts. ·   Ability to rise in a single movement  []   0 ·   Pushes up, successful in one attempt  [x]   1 ·   Multiple attempts, but successful  []   3 ·   Unable to rise without assistance  []   4   Total: (5 or greater = High Risk) 2     Falls Prevention Plan:   []                Physical Limitations to Exercise (specify):   []                Mobility Assistance Device (type):   []                Exercise/Equipment Adaptation (specify):    ©2010 [facility] of [address] Patent #[id number].  Federal Law prohibits the replication, distribution or use without written permission from [facility] [employer]

## 2017-05-05 NOTE — PROGRESS NOTES
Jose Augustin  : 1945 [facility] [address]  [phone]   [phone]       OUTPATIENT PHYSICAL THERAPY:Initial Assessment 2017    ICD-10: Treatment Diagnosis: Cervicalgia (M54.2)   RADICULOPATHY, CERVICAL REGION M54.12       Precautions/Allergies:   Adhesive and Bactrim [sulfamethoxazole-trimethoprim]   Fall Risk Score: 2 (? 5 = High Risk)  MD Orders: Eval and Treat: TRACTION MEDICAL/REFERRING DIAGNOSIS:  Radiculopathy, cervical region [M54.12]  Other cervical disc degeneration, mid-cervical region, unspecified level [M50.320]   DATE OF ONSET: 17  REFERRING PHYSICIAN: Darlyn Vogel PA  RETURN PHYSICIAN APPOINTMENT: TBD by patient      INITIAL ASSESSMENT:  Mr. Will Caro has attended 1 physical therapy session including initial evaluation. Will Caro presents with S/S of increased pain, decreased ROM, decreased strength, decreased functional tolerance consistent with S/S of cervical radiculopathy. This is a chronic condition for Will Caro   of which had little response from PT previously. Will begin mechanical traction today. Shot did improve symptoms per Will Caro  . Will Caro will benefit from mechanical traction, home exercise program, therapeutic and postural strengthening exercises, manual therapeutic techniques (ie. Distraction, SOR, myofascial release/soft tissue mobilization) as appropriate to address Jose John's current condition. Will Caro will benefit from skilled PT (medically necessary) to address above deficits affecting participation in basic ADLs and overall functional tolerance. PROBLEM LIST (Impacting functional limitations):  1. Decreased Strength  2. Decreased ADL/Functional Activities  3. Decreased Transfer Abilities  4. Decreased Ambulation Ability/Technique  5. Decreased Balance  6.  Increased Pain  7. Decreased Activity Tolerance  8. Increased Fatigue  9. Increased Shortness of Breath  10. Decreased Dawn with Home Exercise Program INTERVENTIONS PLANNED:  1. Balance Exercise  2. Bed Mobility  3. Cold  4. Cryotherapy  5. Family Education  6. Gait Training  7. Heat  8. Home Exercise Program (HEP)  9. Manual Therapy  10. Neuromuscular Re-education/Strengthening  11. Range of Motion (ROM)  12. Therapeutic Activites  13. Therapeutic Exercise/Strengthening  14. Transfer Training   TREATMENT PLAN:  Effective Dates: 5/5/17 TO 8/5/17. Frequency/Duration: 2 times a week for 12 weeks   GOALS: (Goals have been discussed and agreed upon with patient.)  SHORT-TERM FUNCTIONAL GOALS: Time Frame: 4 weeks  1. Lelo Pedersen will report <=2/10 pain to cervical spine with performance of functional spinal mobility and rotation. [address] [place] [address] will demonstrate improved NDI score, indicating spinal improvement from 14/50 to 12/50 affecting minimal to no difficulty with performance of cervical mobility and strengthening. [address] to be independent with initial HEP for cervical region and UE's.   [facility] will improve ROM to >=90% to assist with improved function during instrumental activities of daily living. [facility] will improve MMT to >=4+/5 to all UE strengthening to return to PLOF and improve functional tolerance. DISCHARGE GOALS: Time Frame: 8 weeks  1. Lelo Pedersen will report <=1/10 pain to cervical spine with performance of functional spinal mobility and rotation and minimal to no difficulty with such tasks. [address] [place] [address] St will demonstrate improved NDI score, indicating spinal improvement from 14/50 to 10/50 affecting minimal to no difficulty with performance of cervical mobility and strengthening. [address] to be independent with advanced HEP for cervical region and UE's.      Rehabilitation Potential For Stated Goals: Good  Regarding Ely John's therapy, I certify that the treatment plan above will be carried out by a therapist or under their direction. Thank you for this referral,  Corby Magallon DPT     Referring Physician Signature: MANASA Glover              Date                    HISTORY:   History of Present Injury/Illness (Reason for Referral): At times his L shoulder to upper neck gets tingling. \"It's harder to get relief with even heat or rubs or medication. The pain is every day. The pain is ONLY ON THE LEFT. It travels up his head and heck but does not affect R side. Wife states \"He really got okay with neck after shots. He had L foot surgery and after being home from surgery he developed L arm/neck pain. \"  \"the MD thinks hopefully the therapy will help. The next step is an MRI and then shots. \"  Past Medical History/Comorbidities:   Mr. Alondra Haas  has a past medical history of Anxiety; Cancer ([address] (2010); Chronic pain; Hyperlipidemia; Hypertension; Morbid obesity ([address] Osteoarthritis; Personal history of prostate cancer; Seizures ([address] (first one 30's); Sleep apnea; and Thromboembolus ([address] (~2009). Mr. Alondra Haas  has a past surgical history that includes bladder suspension; other surgical; knee arthroscopy; knee replacement; orthopaedic; prostatectomy (12/2010); and cataract removal (Bilateral). Social History/Living Environment:       Social History     Social History    Marital status:      Spouse name: N/A    Number of children: N/A    Years of education: N/A     Occupational History    Not on file. Social History Main Topics    Smoking status: Never Smoker    Smokeless tobacco: Never Used    Alcohol use No    Drug use: No    Sexual activity: Not on file     Other Topics Concern    Not on file     Social History Narrative     Prior Level of Function/Work/Activity:  Independent but with pain---VERY SEDENTARY.     Personal Factors:          Sex:  male Age:  70 y.o. Current Medications:    Current Outpatient Prescriptions:     PHENobarbital (LUMINAL) 97.2 mg tablet, Take 1 Tab by mouth nightly. Max Daily Amount: 97.2 mg., Disp: 30 Tab, Rfl: 0    pregabalin (LYRICA) 150 mg capsule, Take 1 Cap by mouth two (2) times a day. Max Daily Amount: 300 mg., Disp: 30 Cap, Rfl: 0    MYRBETRIQ 50 mg ER tablet, Take 1 Tab by mouth daily. Indications: URINARY URGENCY, Disp: 90 Tab, Rfl: 4    acetaminophen (TYLENOL) 325 mg tablet, Take 325 mg by mouth as needed for Pain., Disp: , Rfl:     atorvastatin (LIPITOR) 40 mg tablet, Take 40 mg by mouth nightly., Disp: , Rfl:     cholecalciferol, vitamin D3, 2,000 unit tab, Take 2,000 Units by mouth every morning., Disp: , Rfl:     hydrochlorothiazide (HYDRODIURIL) 25 mg tablet, Take 25 mg by mouth every morning., Disp: , Rfl:     cyanocobalamin (VITAMIN B-12) 1,000 mcg sublingual tablet, Take 1,000 mcg by mouth daily. , Disp: , Rfl:     losartan (COZAAR) 100 mg tablet, Take 100 mg by mouth every morning., Disp: , Rfl:     omega-3 fatty acids-vitamin e (FISH OIL) 1,000 mg cap, Take 1 Cap by mouth daily. , Disp: , Rfl:     propranolol LA (INDERAL LA) 160 mg capsule, Take 120 mg by mouth every morning. Indications: HYPERTENSION, Disp: , Rfl:     venlafaxine-SR (EFFEXOR-XR) 75 mg capsule, Take 75 mg by mouth every morning. Indications: MAJOR DEPRESSIVE DISORDER, Disp: , Rfl:     aspirin 81 mg chewable tablet, Take 81 mg by mouth nightly., Disp: , Rfl:     MULTI-VITAMIN PO, Take 1 Tab by mouth daily. , Disp: , Rfl:      Date Last Reviewed:  5/5/2017   Number of Personal Factors/Comorbidities that affect the Plan of Care: 1-2: MODERATE COMPLEXITY   EXAMINATION:   Observation/Orthostatic Postural Assessment: Forward head and shoulders rounded. Palpation:          Minimal to no tenderness L upper trap and posterior cervical mm.   Hypomobility presents C/S.  ROM:    Joint: Eval Date:  Re-Assess Date: Re-Assess Date: Cervical AROM      Flexion (% of normal): 100% with no pain; no pain with OP     Extension (% of normal): 100% with no pain; no pain with OP     L sidebending (% of normal): 80% with discomfort to L side of neck; no pain with OP     R sidebending (% of normal): 100% no pain; no pain with OP     L rotation (% of normal): 70% with hard end feel---restricted; minimal to no discomfort; no pain with OP     R rotation (% of normal): 100% with no pain; no pain with OP. Shoulder ROM All WFL (flexion, abduction, IR, ER) no pain. Pain at end-range or with AROM? Yes/No See above. Any pain with overpressure? Yes/No        Strength:    Joint: Eval Date:  Re-Assess Date:  Re-Assess Date:     RIGHT LEFT RIGHT LEFT RIGHT LEFT   Shoulder flexion: = 5/5 5/5       Shoulder extension: 5/5 5/5       Shoulder abduction: 5/5 5/5       Shoulder IR: 5/5 5/5       Shoulder ER: 5/5 5/5       Elbow flexion: 5/5 5/5       Elbow extension: 5/5 5/5       Wrist flexion/extension: 5/5 5/5         Hx of ankle surgery in January 2017. Has been out of the boot since last week. Special Tests: Assessed @ Initial Visit    Spurling's Test: Positive/Negative----Negative. Pain/numbness down to L shoulder--does not pass elbow. Neurological Screen: Radiating symptoms? Yes/No---Yes between L shoulder and cervical region---does pass up above occipital.  Reports headaches occasionally but not everyday. Functional Mobility:  Assessed @ Initial Visit ---very sedentary individual.  Balance:  Assessed @ Initial Visit      Body Structures Involved:  1. Nerves  2. Bones  3. Joints  4. Muscles  5. Ligaments Body Functions Affected:  1. Sensory/Pain  2. Neuromusculoskeletal  3. Movement Related Activities and Participation Affected:  1. Mobility  2.  Self Care   Number of elements that affect the Plan of Care: 4+: HIGH COMPLEXITY   CLINICAL PRESENTATION:   Presentation: Evolving clinical presentation with changing clinical characteristics: MODERATE COMPLEXITY   CLINICAL DECISION MAKING:   Outcome Measure: Tool Used: Neck Disability Index (NDI)  Score:  Initial: 14/50  Most Recent: X/50 (Date: -- )   Interpretation of Score: The Neck Disability Index is a revised form of the Oswestry Low Back Pain Index and is designed to measure the activities of daily living in person's with neck pain. Each section is scored on a 0-5 scale, 5 representing the greatest disability. The scores of each section are added together for a total score of 50. Score 0 1-10 11-20 21-30 31-40 41-49 50   Modifier CH CI CJ CK CL CM CN     ? Mobility - Walking and Moving Around:     - CURRENT STATUS: CJ - 20%-39% impaired, limited or restricted    - GOAL STATUS: CI - 1%-19% impaired, limited or restricted    - D/C STATUS:  ---------------To be determined---------------    Medical Necessity:   · Skilled intervention continues to be required due to address above deficits affecting participation in basic ADLs and overall functional tolerance. Reason for Services/Other Comments:  · Patient continues to require skilled intervention due to address above deficits affecting participation in basic ADLs and overall functional tolerance. Use of outcome tool(s) and clinical judgement create a POC that gives a: Clear prediction of patient's progress: LOW COMPLEXITY   TREATMENT:   (In addition to Assessment/Re-Assessment sessions the following treatments were rendered)  THERAPEUTIC EXERCISE: ( minutes):  Exercises per grid below to improve mobility, strength, balance and coordination. Required minimal visual and verbal cues to promote proper body alignment and promote proper body posture. Progressed resistance, range, repetitions and complexity of movement as indicated.      Date:  5.5.17 Date:   Date:     Activity/Exercise Parameters Parameters Parameters                                               NOTE: Whitney John 300#---FOR TRACTION STAY WITHIN 7-10%      MODALITIES: (10 minutes):      Cervical traction supine performed with machine--pull 22# force No adverse reactions noted post-traction---wife in room during session and call light placed for them to alert therapist if needed. (Used abbreviations: MET - muscle energy technique; PNF - proprioceptive neuromuscular facilitation; NMR - neuromuscular re-education; AP - anterior to posterior; PA - posterior to anterior)      Treatment/Session Assessment:  Patient verbalized and demonstrated understanding of HEP. Began mechanical traction today. Will continue mechanical traction per order from MD.  Have tried STM and exercises in the past with very little improvement---mainly mechanical traction will be performed in sessions unless Cayden Gallardo presents otherwise. Patient in agreement. · Pain/ Symptoms: Initial:   2-3/10---5/10 at worst.   Post Session:  2/10 ·   Compliance with Program/Exercises: Will assess as treatment progresses. · Recommendations/Intent for next treatment session: \"Next visit will focus on advancements to more challenging activities and reduction in assistance provided\".   Total Treatment Duration:  PT Patient Time In/Time Out  Time In: 1300  Time Out: [address] Piero Flynn DPT

## 2017-05-08 ENCOUNTER — HOSPITAL ENCOUNTER (OUTPATIENT)
Dept: PHYSICAL THERAPY | Age: 72
Discharge: HOME | End: 2017-05-08
Payer: MEDICARE

## 2017-05-08 PROCEDURE — 97140 MANUAL THERAPY 1/> REGIONS: CPT

## 2017-05-08 PROCEDURE — 97012 MECHANICAL TRACTION THERAPY: CPT

## 2017-05-08 NOTE — PROGRESS NOTES
Tessa Augustin  : 1945 [facility] [address]  [phone]   [phone]       OUTPATIENT PHYSICAL THERAPY:Daily Note  2017    ICD-10: Treatment Diagnosis: Cervicalgia (M54.2)   RADICULOPATHY, CERVICAL REGION M54.12       Precautions/Allergies:   Adhesive and Bactrim [sulfamethoxazole-trimethoprim]   Fall Risk Score: 2 (? 5 = High Risk)  MD Orders: Eval and Treat: TRACTION MEDICAL/REFERRING DIAGNOSIS:  Radiculopathy, cervical region [M54.12]  Other cervical disc degeneration, mid-cervical region, unspecified level [M50.320]   DATE OF ONSET: 17  REFERRING PHYSICIAN: Darlyn Vogel PA  RETURN PHYSICIAN APPOINTMENT: TBD by patient      INITIAL ASSESSMENT:  Mr. Ragini Cadena has attended 1 physical therapy session including initial evaluation. Ragini Cadena presents with S/S of increased pain, decreased ROM, decreased strength, decreased functional tolerance consistent with S/S of cervical radiculopathy. This is a chronic condition for Ragini Cadena   of which had little response from PT previously. Will begin mechanical traction today. Shot did improve symptoms per Ragini Cadena  . Ragini Cadena will benefit from mechanical traction, home exercise program, therapeutic and postural strengthening exercises, manual therapeutic techniques (ie. Distraction, SOR, myofascial release/soft tissue mobilization) as appropriate to address Tessa John's current condition. Ragini Cadena will benefit from skilled PT (medically necessary) to address above deficits affecting participation in basic ADLs and overall functional tolerance. PROBLEM LIST (Impacting functional limitations):  1. Decreased Strength  2. Decreased ADL/Functional Activities  3. Decreased Transfer Abilities  4. Decreased Ambulation Ability/Technique  5. Decreased Balance  6. Increased Pain  7.  Decreased Activity Tolerance  8. Increased Fatigue  9. Increased Shortness of Breath  10. Decreased [place] with Home Exercise Program INTERVENTIONS PLANNED:  1. Balance Exercise  2. Bed Mobility  3. Cold  4. Cryotherapy  5. Family Education  6. Gait Training  7. Heat  8. Home Exercise Program (HEP)  9. Manual Therapy  10. Neuromuscular Re-education/Strengthening  11. Range of Motion (ROM)  12. Therapeutic Activites  13. Therapeutic Exercise/Strengthening  14. Transfer Training   TREATMENT PLAN:  Effective Dates: 5/5/17 TO 8/5/17. Frequency/Duration: 2 times a week for 12 weeks   GOALS: (Goals have been discussed and agreed upon with patient.)  SHORT-TERM FUNCTIONAL GOALS: Time Frame: 4 weeks  1. Corinne Romero will report <=2/10 pain to cervical spine with performance of functional spinal mobility and rotation. [address] will demonstrate improved NDI score, indicating spinal improvement from 14/50 to 12/50 affecting minimal to no difficulty with performance of cervical mobility and strengthening. [address]  to be independent with initial HEP for cervical region and UE's.   [address] will improve ROM to >=90% to assist with improved function during instrumental activities of daily living. [address] will improve MMT to >=4+/5 to all UE strengthening to return to PLOF and improve functional tolerance. DISCHARGE GOALS: Time Frame: 8 weeks  1. Corinne Romero will report <=1/10 pain to cervical spine with performance of functional spinal mobility and rotation and minimal to no difficulty with such tasks. [address] will demonstrate improved NDI score, indicating spinal improvement from 14/50 to 10/50 affecting minimal to no difficulty with performance of cervical mobility and strengthening. [address] to be independent with advanced HEP for cervical region and UE's.      Rehabilitation Potential For Stated Goals: Good  Regarding Fidelina John's therapy, I certify that the treatment plan above will be carried out by a therapist or under their direction. Thank you for this referral,  Raven Ndiaye PTA     Referring Physician Signature: MANASA Manuel              Date                    HISTORY:   History of Present Injury/Illness (Reason for Referral): At times his L shoulder to upper neck gets tingling. \"It's harder to get relief with even heat or rubs or medication. The pain is every day. The pain is ONLY ON THE LEFT. It travels up his head and heck but does not affect R side. Wife states \"He really got okay with neck after shots. He had L foot surgery and after being home from surgery he developed L arm/neck pain. \"  \"the MD thinks hopefully the therapy will help. The next step is an MRI and then shots. \"  Past Medical History/Comorbidities:   Mr. Madeline Aj  has a past medical history of Anxiety; Cancer ([address] (2010); Chronic pain; Hyperlipidemia; Hypertension; Morbid obesity ([address] Osteoarthritis; Personal history of prostate cancer; Seizures ([address] (first one 30's); Sleep apnea; and Thromboembolus ([address] (~2009). Mr. Madeline Aj  has a past surgical history that includes bladder suspension; other surgical; knee arthroscopy; knee replacement; orthopaedic; prostatectomy (12/2010); and cataract removal (Bilateral). Social History/Living Environment:       Social History     Social History    Marital status:      Spouse name: N/A    Number of children: N/A    Years of education: N/A     Occupational History    Not on file. Social History Main Topics    Smoking status: Never Smoker    Smokeless tobacco: Never Used    Alcohol use No    Drug use: No    Sexual activity: Not on file     Other Topics Concern    Not on file     Social History Narrative     Prior Level of Function/Work/Activity:  Independent but with pain---VERY SEDENTARY.     Personal Factors:          Sex:  male        Age:  70 y.o.  Current Medications:    Current Outpatient Prescriptions:     PHENobarbital (LUMINAL) 97.2 mg tablet, Take 1 Tab by mouth nightly. Max Daily Amount: 97.2 mg., Disp: 30 Tab, Rfl: 0    pregabalin (LYRICA) 150 mg capsule, Take 1 Cap by mouth two (2) times a day. Max Daily Amount: 300 mg., Disp: 30 Cap, Rfl: 0    MYRBETRIQ 50 mg ER tablet, Take 1 Tab by mouth daily. Indications: URINARY URGENCY, Disp: 90 Tab, Rfl: 4    acetaminophen (TYLENOL) 325 mg tablet, Take 325 mg by mouth as needed for Pain., Disp: , Rfl:     atorvastatin (LIPITOR) 40 mg tablet, Take 40 mg by mouth nightly., Disp: , Rfl:     cholecalciferol, vitamin D3, 2,000 unit tab, Take 2,000 Units by mouth every morning., Disp: , Rfl:     hydrochlorothiazide (HYDRODIURIL) 25 mg tablet, Take 25 mg by mouth every morning., Disp: , Rfl:     cyanocobalamin (VITAMIN B-12) 1,000 mcg sublingual tablet, Take 1,000 mcg by mouth daily. , Disp: , Rfl:     losartan (COZAAR) 100 mg tablet, Take 100 mg by mouth every morning., Disp: , Rfl:     omega-3 fatty acids-vitamin e (FISH OIL) 1,000 mg cap, Take 1 Cap by mouth daily. , Disp: , Rfl:     propranolol LA (INDERAL LA) 160 mg capsule, Take 120 mg by mouth every morning. Indications: HYPERTENSION, Disp: , Rfl:     venlafaxine-SR (EFFEXOR-XR) 75 mg capsule, Take 75 mg by mouth every morning. Indications: MAJOR DEPRESSIVE DISORDER, Disp: , Rfl:     aspirin 81 mg chewable tablet, Take 81 mg by mouth nightly., Disp: , Rfl:     MULTI-VITAMIN PO, Take 1 Tab by mouth daily. , Disp: , Rfl:      Date Last Reviewed:  5/8/2017   Number of Personal Factors/Comorbidities that affect the Plan of Care: 1-2: MODERATE COMPLEXITY   EXAMINATION:   Observation/Orthostatic Postural Assessment: Forward head and shoulders rounded. Palpation:          Minimal to no tenderness L upper trap and posterior cervical mm.   Hypomobility presents C/S.  ROM:    Joint: Eval Date:  Re-Assess Date: Re-Assess Date: Cervical AROM      Flexion (% of normal): 100% with no pain; no pain with OP     Extension (% of normal): 100% with no pain; no pain with OP     L sidebending (% of normal): 80% with discomfort to L side of neck; no pain with OP     R sidebending (% of normal): 100% no pain; no pain with OP     L rotation (% of normal): 70% with hard end feel---restricted; minimal to no discomfort; no pain with OP     R rotation (% of normal): 100% with no pain; no pain with OP. Shoulder ROM All WFL (flexion, abduction, IR, ER) no pain. Pain at end-range or with AROM? Yes/No See above. Any pain with overpressure? Yes/No        Strength:    Joint: Eval Date:  Re-Assess Date:  Re-Assess Date:     RIGHT LEFT RIGHT LEFT RIGHT LEFT   Shoulder flexion: = 5/5 5/5       Shoulder extension: 5/5 5/5       Shoulder abduction: 5/5 5/5       Shoulder IR: 5/5 5/5       Shoulder ER: 5/5 5/5       Elbow flexion: 5/5 5/5       Elbow extension: 5/5 5/5       Wrist flexion/extension: 5/5 5/5         Hx of ankle surgery in January 2017. Has been out of the boot since last week. Special Tests: Assessed @ Initial Visit    Spurling's Test: Positive/Negative----Negative. Pain/numbness down to L shoulder--does not pass elbow. Neurological Screen: Radiating symptoms? Yes/No---Yes between L shoulder and cervical region---does pass up above occipital.  Reports headaches occasionally but not everyday. Functional Mobility:  Assessed @ Initial Visit ---very sedentary individual.  Balance:  Assessed @ Initial Visit      Body Structures Involved:  1. Nerves  2. Bones  3. Joints  4. Muscles  5. Ligaments Body Functions Affected:  1. Sensory/Pain  2. Neuromusculoskeletal  3. Movement Related Activities and Participation Affected:  1. Mobility  2.  Self Care   Number of elements that affect the Plan of Care: 4+: HIGH COMPLEXITY   CLINICAL PRESENTATION:   Presentation: Evolving clinical presentation with changing clinical characteristics: MODERATE COMPLEXITY   CLINICAL DECISION MAKING:   Outcome Measure: Tool Used: Neck Disability Index (NDI)  Score:  Initial: 14/50  Most Recent: X/50 (Date: -- )   Interpretation of Score: The Neck Disability Index is a revised form of the Oswestry Low Back Pain Index and is designed to measure the activities of daily living in person's with neck pain. Each section is scored on a 0-5 scale, 5 representing the greatest disability. The scores of each section are added together for a total score of 50. Score 0 1-10 11-20 21-30 31-40 41-49 50   Modifier CH CI CJ CK CL CM CN     ? Mobility - Walking and Moving Around:     - CURRENT STATUS: CJ - 20%-39% impaired, limited or restricted    - GOAL STATUS: CI - 1%-19% impaired, limited or restricted    - D/C STATUS:  ---------------To be determined---------------    Medical Necessity:   · Skilled intervention continues to be required due to address above deficits affecting participation in basic ADLs and overall functional tolerance. Reason for Services/Other Comments:  · Patient continues to require skilled intervention due to address above deficits affecting participation in basic ADLs and overall functional tolerance. Use of outcome tool(s) and clinical judgement create a POC that gives a: Clear prediction of patient's progress: LOW COMPLEXITY   TREATMENT:   (In addition to Assessment/Re-Assessment sessions the following treatments were rendered)  THERAPEUTIC EXERCISE: ( minutes):  Exercises per grid below to improve mobility, strength, balance and coordination. Required minimal visual and verbal cues to promote proper body alignment and promote proper body posture. Progressed resistance, range, repetitions and complexity of movement as indicated.      Date:  5.5.17 Date:   Date:     Activity/Exercise Parameters Parameters Parameters                                               NOTE: Jose John 300#---FOR TRACTION STAY WITHIN 7-10%    Manual Therapy: (10 minutes) patient sitting with left arm supported for STM to left more than right upper trap and neck followed by trigger point release over left upper trap to help decrease tightness and pain. MODALITIES: (15 minutes): Prior to manual and traction patient sitting with left arm supported for moist heat to neck and left upper trap to help increase blood flow and decrease tightness and pain. (15 minutes)  Cervical traction supine performed with machine--pull 23# force 10# rest for 60 second hold and 15 second rest.  No adverse reactions noted post-traction. (Used abbreviations: MET - muscle energy technique; PNF - proprioceptive neuromuscular facilitation; NMR - neuromuscular re-education; AP - anterior to posterior; PA - posterior to anterior)    Pre Treatment: patient reported that he is doing some better and pain level today is only 3/10. Some relief following mechanical traction last visit. Treatment/Session Assessment:  Patient tolerated treatment well today with reports of decreased pain and tightness following treatment today. Patient reported that he felt the tightness and pain in left shoulder decreased following treatment today. Responding to treatment. Continue with traction and with manual as indicated. · Pain/ Symptoms: Initial:   2-3/10  Post Session: 1- 2/10 ·   Compliance with Program/Exercises: Will assess as treatment progresses. · Recommendations/Intent for next treatment session: \"Next visit will focus on advancements to more challenging activities and reduction in assistance provided\".   Total Treatment Duration:  PT Patient Time In/Time Out  Time In: 1345  Time Out: [address]

## 2017-05-10 ENCOUNTER — HOSPITAL ENCOUNTER (OUTPATIENT)
Dept: PHYSICAL THERAPY | Age: 72
Discharge: HOME | End: 2017-05-10
Payer: MEDICARE

## 2017-05-10 NOTE — PROGRESS NOTES
Jamila Augustin  : 1945 [facility]  [address] [place] [address]  [phone]   [phone]       OUTPATIENT PHYSICAL THERAPY:Daily Note  5/10/2017    ICD-10: Treatment Diagnosis: Cervicalgia (M54.2)   RADICULOPATHY, CERVICAL REGION M54.12       Precautions/Allergies:   Adhesive and Bactrim [sulfamethoxazole-trimethoprim]   Fall Risk Score: 2 (? 5 = High Risk)  MD Orders: Eval and Treat: TRACTION MEDICAL/REFERRING DIAGNOSIS:  Radiculopathy, cervical region [M54.12]  Other cervical disc degeneration, mid-cervical region, unspecified level [M50.320]   DATE OF ONSET: 17  REFERRING PHYSICIAN: Darlyn Vogel PA  RETURN PHYSICIAN APPOINTMENT: TBD by patient      INITIAL ASSESSMENT:  Mr. Jeremias Mcginnis has attended 1 physical therapy session including initial evaluation. Jeremias Mcginnis presents with S/S of increased pain, decreased ROM, decreased strength, decreased functional tolerance consistent with S/S of cervical radiculopathy. This is a chronic condition for Jeremias Mcginnis   of which had little response from PT previously. Will begin mechanical traction today. Shot did improve symptoms per Jeremias Mcginnis  . Jeremias Mcginnis will benefit from mechanical traction, home exercise program, therapeutic and postural strengthening exercises, manual therapeutic techniques (ie. Distraction, SOR, myofascial release/soft tissue mobilization) as appropriate to address Jamila John's current condition. Jeremias Mcginnis will benefit from skilled PT (medically necessary) to address above deficits affecting participation in basic ADLs and overall functional tolerance. PROBLEM LIST (Impacting functional limitations):  1. Decreased Strength  2. Decreased ADL/Functional Activities  3. Decreased Transfer Abilities  4. Decreased Ambulation Ability/Technique  5. Decreased Balance  6. Increased Pain  7.  Decreased Activity Tolerance  8. Increased Fatigue  9. Increased Shortness of Breath  10. Decreased [place] with Home Exercise Program INTERVENTIONS PLANNED:  1. Balance Exercise  2. Bed Mobility  3. Cold  4. Cryotherapy  5. Family Education  6. Gait Training  7. Heat  8. Home Exercise Program (HEP)  9. Manual Therapy  10. Neuromuscular Re-education/Strengthening  11. Range of Motion (ROM)  12. Therapeutic Activites  13. Therapeutic Exercise/Strengthening  14. Transfer Training   TREATMENT PLAN:  Effective Dates: 5/5/17 TO 8/5/17. Frequency/Duration: 2 times a week for 12 weeks   GOALS: (Goals have been discussed and agreed upon with patient.)  SHORT-TERM FUNCTIONAL GOALS: Time Frame: 4 weeks  1. Lelo Pedersen will report <=2/10 pain to cervical spine with performance of functional spinal mobility and rotation. [address] [place] [address] St will demonstrate improved NDI score, indicating spinal improvement from 14/50 to 12/50 affecting minimal to no difficulty with performance of cervical mobility and strengthening. [address] to be independent with initial HEP for cervical region and UE's.   [address] will improve ROM to >=90% to assist with improved function during instrumental activities of daily living. [facility] will improve MMT to >=4+/5 to all UE strengthening to return to PLOF and improve functional tolerance. DISCHARGE GOALS: Time Frame: 8 weeks  1. Lelo Pedersen will report <=1/10 pain to cervical spine with performance of functional spinal mobility and rotation and minimal to no difficulty with such tasks. [address] [place] [address] St will demonstrate improved NDI score, indicating spinal improvement from 14/50 to 10/50 affecting minimal to no difficulty with performance of cervical mobility and strengthening. [address] to be independent with advanced HEP for cervical region and UE's.      Rehabilitation Potential For Stated Goals: Good  Regarding Karen John's therapy, I certify that the treatment plan above will be carried out by a therapist or under their direction. Thank you for this referral,  Shaji Gay PTA     Referring Physician Signature: MANASA Botello              Date                    HISTORY:   History of Present Injury/Illness (Reason for Referral): At times his L shoulder to upper neck gets tingling. \"It's harder to get relief with even heat or rubs or medication. The pain is every day. The pain is ONLY ON THE LEFT. It travels up his head and heck but does not affect R side. Wife states \"He really got okay with neck after shots. He had L foot surgery and after being home from surgery he developed L arm/neck pain. \"  \"the MD thinks hopefully the therapy will help. The next step is an MRI and then shots. \"  Past Medical History/Comorbidities:   Mr. Lashanda Henderson  has a past medical history of Anxiety; Cancer ([address] (2010); Chronic pain; Hyperlipidemia; Hypertension; Morbid obesity ([address] Osteoarthritis; Personal history of prostate cancer; Seizures ([address] (first one 30's); Sleep apnea; and Thromboembolus ([address] (~2009). Mr. Lashanda Henderson  has a past surgical history that includes bladder suspension; other surgical; knee arthroscopy; knee replacement; orthopaedic; prostatectomy (12/2010); and cataract removal (Bilateral). Social History/Living Environment:       Social History     Social History    Marital status:      Spouse name: N/A    Number of children: N/A    Years of education: N/A     Occupational History    Not on file. Social History Main Topics    Smoking status: Never Smoker    Smokeless tobacco: Never Used    Alcohol use No    Drug use: No    Sexual activity: Not on file     Other Topics Concern    Not on file     Social History Narrative     Prior Level of Function/Work/Activity:  Independent but with pain---VER[employer] SEDENT[employer].     Personal Factors:          Sex:  male        Age:  70 y.o.  Current Medications:    Current Outpatient Prescriptions:     PHENobarbital (LUMINAL) 97.2 mg tablet, Take 1 Tab by mouth nightly. Max Daily Amount: 97.2 mg., Disp: 30 Tab, Rfl: 0    pregabalin (LYRICA) 150 mg capsule, Take 1 Cap by mouth two (2) times a day. Max Daily Amount: 300 mg., Disp: 30 Cap, Rfl: 0    MYRBETRIQ 50 mg ER tablet, Take 1 Tab by mouth daily. Indications: URINARY URGENCY, Disp: 90 Tab, Rfl: 4    acetaminophen (TYLENOL) 325 mg tablet, Take 325 mg by mouth as needed for Pain., Disp: , Rfl:     atorvastatin (LIPITOR) 40 mg tablet, Take 40 mg by mouth nightly., Disp: , Rfl:     cholecalciferol, vitamin D3, 2,000 unit tab, Take 2,000 Units by mouth every morning., Disp: , Rfl:     hydrochlorothiazide (HYDRODIURIL) 25 mg tablet, Take 25 mg by mouth every morning., Disp: , Rfl:     cyanocobalamin (VITAMIN B-12) 1,000 mcg sublingual tablet, Take 1,000 mcg by mouth daily. , Disp: , Rfl:     losartan (COZAAR) 100 mg tablet, Take 100 mg by mouth every morning., Disp: , Rfl:     omega-3 fatty acids-vitamin e (FISH OIL) 1,000 mg cap, Take 1 Cap by mouth daily. , Disp: , Rfl:     propranolol LA (INDERAL LA) 160 mg capsule, Take 120 mg by mouth every morning. Indications: HYPERTENSION, Disp: , Rfl:     venlafaxine-SR (EFFEXOR-XR) 75 mg capsule, Take 75 mg by mouth every morning. Indications: MAJOR DEPRESSIVE DISORDER, Disp: , Rfl:     aspirin 81 mg chewable tablet, Take 81 mg by mouth nightly., Disp: , Rfl:     MULTI-VITAMIN PO, Take 1 Tab by mouth daily. , Disp: , Rfl:      Date Last Reviewed:  5/10/2017   Number of Personal Factors/Comorbidities that affect the Plan of Care: 1-2: MODERATE COMPLEXITY   EXAMINATION:   Observation/Orthostatic Postural Assessment: Forward head and shoulders rounded. Palpation:          Minimal to no tenderness L upper trap and posterior cervical mm.   Hypomobility presents C/S.  ROM:    Joint: Eval Date:  Re-Assess Date: Re-Assess Date: Cervical AROM      Flexion (% of normal): 100% with no pain; no pain with OP     Extension (% of normal): 100% with no pain; no pain with OP     L sidebending (% of normal): 80% with discomfort to L side of neck; no pain with OP     R sidebending (% of normal): 100% no pain; no pain with OP     L rotation (% of normal): 70% with hard end feel---restricted; minimal to no discomfort; no pain with OP     R rotation (% of normal): 100% with no pain; no pain with OP. Shoulder ROM All WFL (flexion, abduction, IR, ER) no pain. Pain at end-range or with AROM? Yes/No See above. Any pain with overpressure? Yes/No        Strength:    Joint: Eval Date:  Re-Assess Date:  Re-Assess Date:     RIGHT LEFT RIGHT LEFT RIGHT LEFT   Shoulder flexion: = 5/5 5/5       Shoulder extension: 5/5 5/5       Shoulder abduction: 5/5 5/5       Shoulder IR: 5/5 5/5       Shoulder ER: 5/5 5/5       Elbow flexion: 5/5 5/5       Elbow extension: 5/5 5/5       Wrist flexion/extension: 5/5 5/5         Hx of ankle surgery in January 2017. Has been out of the boot since last week. Special Tests: Assessed @ Initial Visit    Spurling's Test: Positive/Negative----Negative. Pain/numbness down to L shoulder--does not pass elbow. Neurological Screen: Radiating symptoms? Yes/No---Yes between L shoulder and cervical region---does pass up above occipital.  Reports headaches occasionally but not everyday. Functional Mobility:  Assessed @ Initial Visit ---very sedentary individual.  Balance:  Assessed @ Initial Visit      Body Structures Involved:  1. Nerves  2. Bones  3. Joints  4. Muscles  5. Ligaments Body Functions Affected:  1. Sensory/Pain  2. Neuromusculoskeletal  3. Movement Related Activities and Participation Affected:  1. Mobility  2.  Self Care   Number of elements that affect the Plan of Care: 4+: HIGH COMPLEXITY   CLINICAL PRESENTATION:   Presentation: Evolving clinical presentation with changing clinical characteristics: MODERATE COMPLEXITY   CLINICAL DECISION MAKING:   Outcome Measure: Tool Used: Neck Disability Index (NDI)  Score:  Initial: 14/50  Most Recent: X/50 (Date: -- )   Interpretation of Score: The Neck Disability Index is a revised form of the Oswestry Low Back Pain Index and is designed to measure the activities of daily living in person's with neck pain. Each section is scored on a 0-5 scale, 5 representing the greatest disability. The scores of each section are added together for a total score of 50. Score 0 1-10 11-20 21-30 31-40 41-49 50   Modifier CH CI CJ CK CL CM CN     ? Mobility - Walking and Moving Around:     - CURRENT STATUS: CJ - 20%-39% impaired, limited or restricted    - GOAL STATUS: CI - 1%-19% impaired, limited or restricted    - D/C STATUS:  ---------------To be determined---------------    Medical Necessity:   · Skilled intervention continues to be required due to address above deficits affecting participation in basic ADLs and overall functional tolerance. Reason for Services/Other Comments:  · Patient continues to require skilled intervention due to address above deficits affecting participation in basic ADLs and overall functional tolerance. Use of outcome tool(s) and clinical judgement create a POC that gives a: Clear prediction of patient's progress: LOW COMPLEXITY   TREATMENT:   (In addition to Assessment/Re-Assessment sessions the following treatments were rendered)  THERAPEUTIC EXERCISE: ( minutes):  Exercises per grid below to improve mobility, strength, balance and coordination. Required minimal visual and verbal cues to promote proper body alignment and promote proper body posture. Progressed resistance, range, repetitions and complexity of movement as indicated.      Date:  5.5.17 Date:   Date:     Activity/Exercise Parameters Parameters Parameters                                               NOTE: Izaiah John 300#---FOR TRACTION STAY WITHIN 7-10%    Manual Therapy: (15 minutes) patient sitting with left arm supported for STM to left more than right upper trap and neck followed by trigger point release over left upper trap to help decrease tightness and pain. MODALITIES: (15 minutes): Prior to manual and traction patient sitting with left arm supported for moist heat to neck and left upper trap to help increase blood flow and decrease tightness and pain. (15 minutes)  Cervical traction supine performed with machine--pull 24# force 10# rest for 60 second hold and 15 second rest.  No adverse reactions noted post-traction. (Used abbreviations: MET - muscle energy technique; PNF - proprioceptive neuromuscular facilitation; NMR - neuromuscular re-education; AP - anterior to posterior; PA - posterior to anterior)    Pre Treatment: patient reported that he is doing some better and pain level today is only 3/10. Some relief following mechanical traction last visit. Treatment/Session Assessment:  Patient tolerated treatment well but did not have decreased symptoms as much as last visit. Pain level and stiffness remained the same per patient. Continue with traction and with manual as indicated. · Pain/ Symptoms: Initial:   2-3/10  Post Session: 1- 2/10 ·   Compliance with Program/Exercises: Will assess as treatment progresses. · Recommendations/Intent for next treatment session: \"Next visit will focus on advancements to more challenging activities and reduction in assistance provided\".   Total Treatment Duration:  PT Patient Time In/Time Out  Time In: 1345  Time Out: [address] LAN[address] Marie

## 2017-05-15 ENCOUNTER — HOSPITAL ENCOUNTER (OUTPATIENT)
Dept: PHYSICAL THERAPY | Age: 72
Discharge: HOME | End: 2017-05-15
Payer: MEDICARE

## 2017-05-15 PROCEDURE — 97012 MECHANICAL TRACTION THERAPY: CPT

## 2017-05-15 NOTE — PROGRESS NOTES
Lady Augustin  : 1945 [facility] [address]  [phone]   [phone]       OUTPATIENT PHYSICAL THERAPY:Daily Note  5/15/2017    ICD-10: Treatment Diagnosis: Cervicalgia (M54.2)   RADICULOPATHY, CERVICAL REGION M54.12       Precautions/Allergies:   Adhesive and Bactrim [sulfamethoxazole-trimethoprim]   Fall Risk Score: 2 (? 5 = High Risk)  MD Orders: Eval and Treat: TRACTION MEDICAL/REFERRING DIAGNOSIS:  Radiculopathy, cervical region [M54.12]  Other cervical disc degeneration, mid-cervical region, unspecified level [M50.320]   DATE OF ONSET: 17  REFERRING PHYSICIAN: Darlyn Vogel PA  RETURN PHYSICIAN APPOINTMENT: TBD by patient      INITIAL ASSESSMENT:  Mr. Kvng Delgado has attended 1 physical therapy session including initial evaluation. Kvng Delgado presents with S/S of increased pain, decreased ROM, decreased strength, decreased functional tolerance consistent with S/S of cervical radiculopathy. This is a chronic condition for Kvng Delgado   of which had little response from PT previously. Will begin mechanical traction today. Shot did improve symptoms per Kvng Delgado  . Kvng Delgado will benefit from mechanical traction, home exercise program, therapeutic and postural strengthening exercises, manual therapeutic techniques (ie. Distraction, SOR, myofascial release/soft tissue mobilization) as appropriate to address Lady John's current condition. Kvng Delgado will benefit from skilled PT (medically necessary) to address above deficits affecting participation in basic ADLs and overall functional tolerance. PROBLEM LIST (Impacting functional limitations):  1. Decreased Strength  2. Decreased ADL/Functional Activities  3. Decreased Transfer Abilities  4. Decreased Ambulation Ability/Technique  5. Decreased Balance  6. Increased Pain  7.  Decreased Activity Tolerance  8. Increased Fatigue  9. Increased Shortness of Breath  10. Decreased [place] with Home Exercise Program INTERVENTIONS PLANNED:  1. Balance Exercise  2. Bed Mobility  3. Cold  4. Cryotherapy  5. Family Education  6. Gait Training  7. Heat  8. Home Exercise Program (HEP)  9. Manual Therapy  10. Neuromuscular Re-education/Strengthening  11. Range of Motion (ROM)  12. Therapeutic Activites  13. Therapeutic Exercise/Strengthening  14. Transfer Training   TREATMENT PLAN:  Effective Dates: 5/5/17 TO 8/5/17. Frequency/Duration: 2 times a week for 12 weeks   GOALS: (Goals have been discussed and agreed upon with patient.)  SHORT-TERM FUNCTIONAL GOALS: Time Frame: 4 weeks  1. Antonina Osuna will report <=2/10 pain to cervical spine with performance of functional spinal mobility and rotation. [address] [place] [address] St will demonstrate improved NDI score, indicating spinal improvement from 14/50 to 12/50 affecting minimal to no difficulty with performance of cervical mobility and strengthening. [address] to be independent with initial HEP for cervical region and UE's.   [facility] will improve ROM to >=90% to assist with improved function during instrumental activities of daily living. [facility] will improve MMT to >=4+/5 to all UE strengthening to return to PLOF and improve functional tolerance. DISCHARGE GOALS: Time Frame: 8 weeks  1. Antonina Osuna will report <=1/10 pain to cervical spine with performance of functional spinal mobility and rotation and minimal to no difficulty with such tasks. [address] [place] [address] St will demonstrate improved NDI score, indicating spinal improvement from 14/50 to 10/50 affecting minimal to no difficulty with performance of cervical mobility and strengthening. [address] to be independent with advanced HEP for cervical region and UE's.      Rehabilitation Potential For Stated Goals: Good  Regarding Alethea John's therapy, I certify that the treatment plan above will be carried out by a therapist or under their direction. Thank you for this referral,  Britni Foss PTA     Referring Physician Signature: MANASA Kinney              Date                    HISTORY:   History of Present Injury/Illness (Reason for Referral): At times his L shoulder to upper neck gets tingling. \"It's harder to get relief with even heat or rubs or medication. The pain is every day. The pain is ONLY ON THE LEFT. It travels up his head and heck but does not affect R side. Wife states \"He really got okay with neck after shots. He had L foot surgery and after being home from surgery he developed L arm/neck pain. \"  \"the MD thinks hopefully the therapy will help. The next step is an MRI and then shots. \"  Past Medical History/Comorbidities:   Mr. Pasquale Carrel  has a past medical history of Anxiety; Cancer ([address] (2010); Chronic pain; Hyperlipidemia; Hypertension; Morbid obesity ([address] Osteoarthritis; Personal history of prostate cancer; Seizures ([address] (first one 30's); Sleep apnea; and Thromboembolus ([address] (~2009). Mr. Pasquale Carrel  has a past surgical history that includes bladder suspension; other surgical; knee arthroscopy; knee replacement; orthopaedic; prostatectomy (12/2010); and cataract removal (Bilateral). Social History/Living Environment:       Social History     Social History    Marital status:      Spouse name: N/A    Number of children: N/A    Years of education: N/A     Occupational History    Not on file. Social History Main Topics    Smoking status: Never Smoker    Smokeless tobacco: Never Used    Alcohol use No    Drug use: No    Sexual activity: Not on file     Other Topics Concern    Not on file     Social History Narrative     Prior Level of Function/Work/Activity:  Independent but with pain---VER[employer] SEDENT[employer].     Personal Factors:          Sex:  male        Age:  70 y.o.  Current Medications:    Current Outpatient Prescriptions:     PHENobarbital (LUMINAL) 97.2 mg tablet, Take 1 Tab by mouth nightly. Max Daily Amount: 97.2 mg., Disp: 30 Tab, Rfl: 0    pregabalin (LYRICA) 150 mg capsule, Take 1 Cap by mouth two (2) times a day. Max Daily Amount: 300 mg., Disp: 30 Cap, Rfl: 0    MYRBETRIQ 50 mg ER tablet, Take 1 Tab by mouth daily. Indications: URINARY URGENCY, Disp: 90 Tab, Rfl: 4    acetaminophen (TYLENOL) 325 mg tablet, Take 325 mg by mouth as needed for Pain., Disp: , Rfl:     atorvastatin (LIPITOR) 40 mg tablet, Take 40 mg by mouth nightly., Disp: , Rfl:     cholecalciferol, vitamin D3, 2,000 unit tab, Take 2,000 Units by mouth every morning., Disp: , Rfl:     hydrochlorothiazide (HYDRODIURIL) 25 mg tablet, Take 25 mg by mouth every morning., Disp: , Rfl:     cyanocobalamin (VITAMIN B-12) 1,000 mcg sublingual tablet, Take 1,000 mcg by mouth daily. , Disp: , Rfl:     losartan (COZAAR) 100 mg tablet, Take 100 mg by mouth every morning., Disp: , Rfl:     omega-3 fatty acids-vitamin e (FISH OIL) 1,000 mg cap, Take 1 Cap by mouth daily. , Disp: , Rfl:     propranolol LA (INDERAL LA) 160 mg capsule, Take 120 mg by mouth every morning. Indications: HYPERTENSION, Disp: , Rfl:     venlafaxine-SR (EFFEXOR-XR) 75 mg capsule, Take 75 mg by mouth every morning. Indications: MAJOR DEPRESSIVE DISORDER, Disp: , Rfl:     aspirin 81 mg chewable tablet, Take 81 mg by mouth nightly., Disp: , Rfl:     MULTI-VITAMIN PO, Take 1 Tab by mouth daily. , Disp: , Rfl:      Date Last Reviewed:  5/15/2017   Number of Personal Factors/Comorbidities that affect the Plan of Care: 1-2: MODERATE COMPLEXITY   EXAMINATION:   Observation/Orthostatic Postural Assessment: Forward head and shoulders rounded. Palpation:          Minimal to no tenderness L upper trap and posterior cervical mm.   Hypomobility presents C/S.  ROM:    Joint: Eval Date:  Re-Assess Date: Re-Assess Date: Cervical AROM      Flexion (% of normal): 100% with no pain; no pain with OP     Extension (% of normal): 100% with no pain; no pain with OP     L sidebending (% of normal): 80% with discomfort to L side of neck; no pain with OP     R sidebending (% of normal): 100% no pain; no pain with OP     L rotation (% of normal): 70% with hard end feel---restricted; minimal to no discomfort; no pain with OP     R rotation (% of normal): 100% with no pain; no pain with OP. Shoulder ROM All WFL (flexion, abduction, IR, ER) no pain. Pain at end-range or with AROM? Yes/No See above. Any pain with overpressure? Yes/No        Strength:    Joint: Eval Date:  Re-Assess Date:  Re-Assess Date:     RIGHT LEFT RIGHT LEFT RIGHT LEFT   Shoulder flexion: = 5/5 5/5       Shoulder extension: 5/5 5/5       Shoulder abduction: 5/5 5/5       Shoulder IR: 5/5 5/5       Shoulder ER: 5/5 5/5       Elbow flexion: 5/5 5/5       Elbow extension: 5/5 5/5       Wrist flexion/extension: 5/5 5/5         Hx of ankle surgery in January 2017. Has been out of the boot since last week. Special Tests: Assessed @ Initial Visit    Spurling's Test: Positive/Negative----Negative. Pain/numbness down to L shoulder--does not pass elbow. Neurological Screen: Radiating symptoms? Yes/No---Yes between L shoulder and cervical region---does pass up above occipital.  Reports headaches occasionally but not everyday. Functional Mobility:  Assessed @ Initial Visit ---very sedentary individual.  Balance:  Assessed @ Initial Visit      Body Structures Involved:  1. Nerves  2. Bones  3. Joints  4. Muscles  5. Ligaments Body Functions Affected:  1. Sensory/Pain  2. Neuromusculoskeletal  3. Movement Related Activities and Participation Affected:  1. Mobility  2.  Self Care   Number of elements that affect the Plan of Care: 4+: HIGH COMPLEXITY   CLINICAL PRESENTATION:   Presentation: Evolving clinical presentation with changing clinical characteristics: MODERATE COMPLEXITY   CLINICAL DECISION MAKING:   Outcome Measure: Tool Used: Neck Disability Index (NDI)  Score:  Initial: 14/50  Most Recent: X/50 (Date: -- )   Interpretation of Score: The Neck Disability Index is a revised form of the Oswestry Low Back Pain Index and is designed to measure the activities of daily living in person's with neck pain. Each section is scored on a 0-5 scale, 5 representing the greatest disability. The scores of each section are added together for a total score of 50. Score 0 1-10 11-20 21-30 31-40 41-49 50   Modifier CH CI CJ CK CL CM CN     ? Mobility - Walking and Moving Around:     - CURRENT STATUS: CJ - 20%-39% impaired, limited or restricted    - GOAL STATUS: CI - 1%-19% impaired, limited or restricted    - D/C STATUS:  ---------------To be determined---------------    Medical Necessity:   · Skilled intervention continues to be required due to address above deficits affecting participation in basic ADLs and overall functional tolerance. Reason for Services/Other Comments:  · Patient continues to require skilled intervention due to address above deficits affecting participation in basic ADLs and overall functional tolerance. Use of outcome tool(s) and clinical judgement create a POC that gives a: Clear prediction of patient's progress: LOW COMPLEXITY   TREATMENT:   (In addition to Assessment/Re-Assessment sessions the following treatments were rendered)  THERAPEUTIC EXERCISE: ( minutes):  Exercises per grid below to improve mobility, strength, balance and coordination. Required minimal visual and verbal cues to promote proper body alignment and promote proper body posture. Progressed resistance, range, repetitions and complexity of movement as indicated.      Date:  5.5.17 Date:   Date:     Activity/Exercise Parameters Parameters Parameters                                               NOTE: Gustavo John 300#---FOR TRACTION STAY WITHIN 7-10%    Manual Therapy:   MODALITIES: (15 minutes): Patient sitting with arms supported for moist heat to neck and left upper trap to help increase blood flow and decrease pain and tightness. (15 minutes)  Cervical traction supine performed with machine--pull 24# force 12# rest for 60 second hold and 15 second rest.  No adverse reactions noted post-traction. (Used abbreviations: MET - muscle energy technique; PNF - proprioceptive neuromuscular facilitation; NMR - neuromuscular re-education; AP - anterior to posterior; PA - posterior to anterior)    Pre Treatment: Patient reported that his left upper trap feels better but his neck pain has increased. Patient stated he tolerated last visit well without increased symptoms. Pain level in neck is 4-5/10 today. Treatment/Session Assessment:  Patient tolerated treatment well but did not have decreased symptoms as much as last visit. Pain level and stiffness remained the same per patient. Continue with traction and with manual as indicated. · Pain/ Symptoms: Initial:   /10  Post Session: 1- 2/10 ·   Compliance with Program/Exercises: Will assess as treatment progresses. · Recommendations/Intent for next treatment session: \"Next visit will focus on advancements to more challenging activities and reduction in assistance provided\".   Total Treatment Duration:       Ben Oliver PTA

## 2017-05-18 ENCOUNTER — HOSPITAL ENCOUNTER (OUTPATIENT)
Dept: PHYSICAL THERAPY | Age: 72
Discharge: HOME | End: 2017-05-18
Payer: MEDICARE

## 2017-05-18 PROCEDURE — 97012 MECHANICAL TRACTION THERAPY: CPT

## 2017-05-18 NOTE — PROGRESS NOTES
Vivian Augustin  : 1945 [facility] [place] [address] [address] [facility]  [phone]   [phone]       OUTPATIENT PHYSICAL THERAPY:Daily Note  2017    ICD-10: Treatment Diagnosis: Cervicalgia (M54.2)   RADICULOPATHY, CERVICAL REGION M54.12       Precautions/Allergies:   Adhesive and Bactrim [sulfamethoxazole-trimethoprim]   Fall Risk Score: 2 (? 5 = High Risk)  MD Orders: Eval and Treat: TRACTION MEDICAL/REFERRING DIAGNOSIS:  Radiculopathy, cervical region [M54.12]  Other cervical disc degeneration, mid-cervical region, unspecified level [M50.320]   DATE OF ONSET: 17  REFERRING PHYSICIAN: Darlyn Vogel PA  RETURN PHYSICIAN APPOINTMENT: TBD by patient      INITIAL ASSESSMENT:  Mr. Dasha Sarkar has attended 1 physical therapy session including initial evaluation. Dasha Sarkar presents with S/S of increased pain, decreased ROM, decreased strength, decreased functional tolerance consistent with S/S of cervical radiculopathy. This is a chronic condition for Dasha Sarkar   of which had little response from PT previously. Will begin mechanical traction today. Shot did improve symptoms per Dasha Sarkar  . Dasha Sarkar will benefit from mechanical traction, home exercise program, therapeutic and postural strengthening exercises, manual therapeutic techniques (ie. Distraction, SOR, myofascial release/soft tissue mobilization) as appropriate to address Vivian John's current condition. Dasha Sarkar will benefit from skilled PT (medically necessary) to address above deficits affecting participation in basic ADLs and overall functional tolerance. PROBLEM LIST (Impacting functional limitations):  1. Decreased Strength  2. Decreased ADL/Functional Activities  3. Decreased Transfer Abilities  4. Decreased Ambulation Ability/Technique  5. Decreased Balance  6. Increased Pain  7.  Decreased Activity Tolerance  8. Increased Fatigue  9. Increased Shortness of Breath  10. Decreased [place] with Home Exercise Program INTERVENTIONS PLANNED:  1. Balance Exercise  2. Bed Mobility  3. Cold  4. Cryotherapy  5. Family Education  6. Gait Training  7. Heat  8. Home Exercise Program (HEP)  9. Manual Therapy  10. Neuromuscular Re-education/Strengthening  11. Range of Motion (ROM)  12. Therapeutic Activites  13. Therapeutic Exercise/Strengthening  14. Transfer Training   TREATMENT PLAN:  Effective Dates: 5/5/17 TO 8/5/17. Frequency/Duration: 2 times a week for 12 weeks   GOALS: (Goals have been discussed and agreed upon with patient.)  SHORT-TERM FUNCTIONAL GOALS: Time Frame: 4 weeks  1. Teodora Pouch will report <=2/10 pain to cervical spine with performance of functional spinal mobility and rotation. [address] will demonstrate improved NDI score, indicating spinal improvement from 14/50 to 12/50 affecting minimal to no difficulty with performance of cervical mobility and strengthening. [address] to be independent with initial HEP for cervical region and UE's.   [address] will improve ROM to >=90% to assist with improved function during instrumental activities of daily living. [address] will improve MMT to >=4+/5 to all UE strengthening to return to PLOF and improve functional tolerance. DISCHARGE GOALS: Time Frame: 8 weeks  1. Teodora Pouch will report <=1/10 pain to cervical spine with performance of functional spinal mobility and rotation and minimal to no difficulty with such tasks. [address] will demonstrate improved NDI score, indicating spinal improvement from 14/50 to 10/50 affecting minimal to no difficulty with performance of cervical mobility and strengthening. [address] to be independent with advanced HEP for cervical region and UE's.      Rehabilitation Potential For Stated Goals: Good  Regarding Yamel John's therapy, I certify that the treatment plan above will be carried out by a therapist or under their direction. Thank you for this referral,  Geno Amin PTA     Referring Physician Signature: MANASA Enriquez              Date                    HISTORY:   History of Present Injury/Illness (Reason for Referral): At times his L shoulder to upper neck gets tingling. \"It's harder to get relief with even heat or rubs or medication. The pain is every day. The pain is ONLY ON THE LEFT. It travels up his head and heck but does not affect R side. Wife states \"He really got okay with neck after shots. He had L foot surgery and after being home from surgery he developed L arm/neck pain. \"  \"the MD thinks hopefully the therapy will help. The next step is an MRI and then shots. \"  Past Medical History/Comorbidities:   Mr. Max Marcum  has a past medical history of Anxiety; Cancer ([address] (2010); Chronic pain; Hyperlipidemia; Hypertension; Morbid obesity ([address] Osteoarthritis; Personal history of prostate cancer; Seizures ([address] (first one 30's); Sleep apnea; and Thromboembolus ([address] (~2009). Mr. Max Marcum  has a past surgical history that includes bladder suspension; other surgical; knee arthroscopy; knee replacement; orthopaedic; prostatectomy (12/2010); and cataract removal (Bilateral). Social History/Living Environment:       Social History     Social History    Marital status:      Spouse name: N/A    Number of children: N/A    Years of education: N/A     Occupational History    Not on file. Social History Main Topics    Smoking status: Never Smoker    Smokeless tobacco: Never Used    Alcohol use No    Drug use: No    Sexual activity: Not on file     Other Topics Concern    Not on file     Social History Narrative     Prior Level of Function/Work/Activity:  Independent but with pain---VERY SEDENTARY.     Personal Factors:          Sex:  male        Age:  70 y.o.  Current Medications:    Current Outpatient Prescriptions:     PHENobarbital (LUMINAL) 97.2 mg tablet, Take 1 Tab by mouth nightly. Max Daily Amount: 97.2 mg., Disp: 30 Tab, Rfl: 0    pregabalin (LYRICA) 150 mg capsule, Take 1 Cap by mouth two (2) times a day. Max Daily Amount: 300 mg., Disp: 30 Cap, Rfl: 0    MYRBETRIQ 50 mg ER tablet, Take 1 Tab by mouth daily. Indications: URINARY URGENCY, Disp: 90 Tab, Rfl: 4    acetaminophen (TYLENOL) 325 mg tablet, Take 325 mg by mouth as needed for Pain., Disp: , Rfl:     atorvastatin (LIPITOR) 40 mg tablet, Take 40 mg by mouth nightly., Disp: , Rfl:     cholecalciferol, vitamin D3, 2,000 unit tab, Take 2,000 Units by mouth every morning., Disp: , Rfl:     hydrochlorothiazide (HYDRODIURIL) 25 mg tablet, Take 25 mg by mouth every morning., Disp: , Rfl:     cyanocobalamin (VITAMIN B-12) 1,000 mcg sublingual tablet, Take 1,000 mcg by mouth daily. , Disp: , Rfl:     losartan (COZAAR) 100 mg tablet, Take 100 mg by mouth every morning., Disp: , Rfl:     omega-3 fatty acids-vitamin e (FISH OIL) 1,000 mg cap, Take 1 Cap by mouth daily. , Disp: , Rfl:     propranolol LA (INDERAL LA) 160 mg capsule, Take 120 mg by mouth every morning. Indications: HYPERTENSION, Disp: , Rfl:     venlafaxine-SR (EFFEXOR-XR) 75 mg capsule, Take 75 mg by mouth every morning. Indications: MAJOR DEPRESSIVE DISORDER, Disp: , Rfl:     aspirin 81 mg chewable tablet, Take 81 mg by mouth nightly., Disp: , Rfl:     MULTI-VITAMIN PO, Take 1 Tab by mouth daily. , Disp: , Rfl:      Date Last Reviewed:  5/18/2017   Number of Personal Factors/Comorbidities that affect the Plan of Care: 1-2: MODERATE COMPLEXITY   EXAMINATION:   Observation/Orthostatic Postural Assessment: Forward head and shoulders rounded. Palpation:          Minimal to no tenderness L upper trap and posterior cervical mm.   Hypomobility presents C/S.  ROM:    Joint: Eval Date:  Re-Assess Date: Re-Assess Date: Cervical AROM      Flexion (% of normal): 100% with no pain; no pain with OP     Extension (% of normal): 100% with no pain; no pain with OP     L sidebending (% of normal): 80% with discomfort to L side of neck; no pain with OP     R sidebending (% of normal): 100% no pain; no pain with OP     L rotation (% of normal): 70% with hard end feel---restricted; minimal to no discomfort; no pain with OP     R rotation (% of normal): 100% with no pain; no pain with OP. Shoulder ROM All WFL (flexion, abduction, IR, ER) no pain. Pain at end-range or with AROM? Yes/No See above. Any pain with overpressure? Yes/No        Strength:    Joint: Eval Date:  Re-Assess Date:  Re-Assess Date:     RIGHT LEFT RIGHT LEFT RIGHT LEFT   Shoulder flexion: = 5/5 5/5       Shoulder extension: 5/5 5/5       Shoulder abduction: 5/5 5/5       Shoulder IR: 5/5 5/5       Shoulder ER: 5/5 5/5       Elbow flexion: 5/5 5/5       Elbow extension: 5/5 5/5       Wrist flexion/extension: 5/5 5/5         Hx of ankle surgery in January 2017. Has been out of the boot since last week. Special Tests: Assessed @ Initial Visit    Spurling's Test: Positive/Negative----Negative. Pain/numbness down to L shoulder--does not pass elbow. Neurological Screen: Radiating symptoms? Yes/No---Yes between L shoulder and cervical region---does pass up above occipital.  Reports headaches occasionally but not everyday. Functional Mobility:  Assessed @ Initial Visit ---very sedentary individual.  Balance:  Assessed @ Initial Visit      Body Structures Involved:  1. Nerves  2. Bones  3. Joints  4. Muscles  5. Ligaments Body Functions Affected:  1. Sensory/Pain  2. Neuromusculoskeletal  3. Movement Related Activities and Participation Affected:  1. Mobility  2.  Self Care   Number of elements that affect the Plan of Care: 4+: HIGH COMPLEXITY   CLINICAL PRESENTATION:   Presentation: Evolving clinical presentation with changing clinical characteristics: MODERATE COMPLEXITY   CLINICAL DECISION MAKING:   Outcome Measure: Tool Used: Neck Disability Index (NDI)  Score:  Initial: 14/50  Most Recent: X/50 (Date: -- )   Interpretation of Score: The Neck Disability Index is a revised form of the Oswestry Low Back Pain Index and is designed to measure the activities of daily living in person's with neck pain. Each section is scored on a 0-5 scale, 5 representing the greatest disability. The scores of each section are added together for a total score of 50. Score 0 1-10 11-20 21-30 31-40 41-49 50   Modifier CH CI CJ CK CL CM CN     ? Mobility - Walking and Moving Around:     - CURRENT STATUS: CJ - 20%-39% impaired, limited or restricted    - GOAL STATUS: CI - 1%-19% impaired, limited or restricted    - D/C STATUS:  ---------------To be determined---------------    Medical Necessity:   · Skilled intervention continues to be required due to address above deficits affecting participation in basic ADLs and overall functional tolerance. Reason for Services/Other Comments:  · Patient continues to require skilled intervention due to address above deficits affecting participation in basic ADLs and overall functional tolerance. Use of outcome tool(s) and clinical judgement create a POC that gives a: Clear prediction of patient's progress: LOW COMPLEXITY   TREATMENT:   (In addition to Assessment/Re-Assessment sessions the following treatments were rendered)  THERAPEUTIC EXERCISE: ( minutes):  Exercises per grid below to improve mobility, strength, balance and coordination. Required minimal visual and verbal cues to promote proper body alignment and promote proper body posture. Progressed resistance, range, repetitions and complexity of movement as indicated.      Date:  5.5.17 Date:   Date:     Activity/Exercise Parameters Parameters Parameters                                               NOTE: [place] 300#---FOR TRACTION STAY WITHIN 7-10%    Manual Therapy:     MODALITIES: Ice offered but patient declined and stated he would use ice at home as needed. (15 minutes)  Cervical traction supine performed with machine--pull 26# force 13# rest for 60 second hold and 15 second rest.  No adverse reactions noted post-traction. (Used abbreviations: MET - muscle energy technique; PNF - proprioceptive neuromuscular facilitation; NMR - neuromuscular re-education; AP - anterior to posterior; PA - posterior to anterior)    Pre Treatment: patient reported that he has been doing better since last visit and he has been using ice on his neck more at home as instructed and he thinks that is helping too. Patient states pain level is 1/10 and very minimal today. Treatment/Session Assessment:  Patient tolerated treatment well with decreased pain. Patient responding well to treatment. Continue per plan of care. · Pain/ Symptoms: Initial:   1/10  Post Session: 0-1/10 ·   Compliance with Program/Exercises: Will assess as treatment progresses. · Recommendations/Intent for next treatment session: \"Next visit will focus on advancements to more challenging activities and reduction in assistance provided\".   Total Treatment Duration:  PT Patient Time In/Time Out  Time In: 1430  Time Out: [address], PTA

## 2017-05-22 ENCOUNTER — HOSPITAL ENCOUNTER (OUTPATIENT)
Dept: PHYSICAL THERAPY | Age: 72
Discharge: HOME | End: 2017-05-22
Payer: MEDICARE

## 2017-05-22 PROCEDURE — 97140 MANUAL THERAPY 1/> REGIONS: CPT

## 2017-05-22 PROCEDURE — 97012 MECHANICAL TRACTION THERAPY: CPT

## 2017-05-22 NOTE — PROGRESS NOTES
Renard Augustin  : 1945 [facility] [address]  [phone]   [phone]       OUTPATIENT PHYSICAL THERAPY:Daily Note  2017    ICD-10: Treatment Diagnosis: Cervicalgia (M54.2)   RADICULOPATHY, CERVICAL REGION M54.12       Precautions/Allergies:   Adhesive and Bactrim [sulfamethoxazole-trimethoprim]   Fall Risk Score: 2 (? 5 = High Risk)  MD Orders: Eval and Treat: TRACTION MEDICAL/REFERRING DIAGNOSIS:  Radiculopathy, cervical region [M54.12]  Other cervical disc degeneration, mid-cervical region, unspecified level [M50.320]   DATE OF ONSET: 17  REFERRING PHYSICIAN: Darlyn Vogel PA  RETURN PHYSICIAN APPOINTMENT: TBD by patient      INITIAL ASSESSMENT:  Mr. Aquiles Vergara has attended 1 physical therapy session including initial evaluation. Aquiles Vergara presents with S/S of increased pain, decreased ROM, decreased strength, decreased functional tolerance consistent with S/S of cervical radiculopathy. This is a chronic condition for Aquiles Vergara   of which had little response from PT previously. Will begin mechanical traction today. Shot did improve symptoms per Aquiles Vergara  . Aquiles Vergara will benefit from mechanical traction, home exercise program, therapeutic and postural strengthening exercises, manual therapeutic techniques (ie. Distraction, SOR, myofascial release/soft tissue mobilization) as appropriate to address Renard John's current condition. Aquiles Vergara will benefit from skilled PT (medically necessary) to address above deficits affecting participation in basic ADLs and overall functional tolerance. PROBLEM LIST (Impacting functional limitations):  1. Decreased Strength  2. Decreased ADL/Functional Activities  3. Decreased Transfer Abilities  4. Decreased Ambulation Ability/Technique  5. Decreased Balance  6. Increased Pain  7.  Decreased Activity Tolerance  8. Increased Fatigue  9. Increased Shortness of Breath  10. Decreased [place] with Home Exercise Program INTERVENTIONS PLANNED:  1. Balance Exercise  2. Bed Mobility  3. Cold  4. Cryotherapy  5. Family Education  6. Gait Training  7. Heat  8. Home Exercise Program (HEP)  9. Manual Therapy  10. Neuromuscular Re-education/Strengthening  11. Range of Motion (ROM)  12. Therapeutic Activites  13. Therapeutic Exercise/Strengthening  14. Transfer Training   TREATMENT PLAN:  Effective Dates: 5/5/17 TO 8/5/17. Frequency/Duration: 2 times a week for 12 weeks   GOALS: (Goals have been discussed and agreed upon with patient.)  SHORT-TERM FUNCTIONAL GOALS: Time Frame: 4 weeks  1. Corinne Romero will report <=2/10 pain to cervical spine with performance of functional spinal mobility and rotation. [address] will demonstrate improved NDI score, indicating spinal improvement from 14/50 to 12/50 affecting minimal to no difficulty with performance of cervical mobility and strengthening. [address]  to be independent with initial HEP for cervical region and UE's.   [address] will improve ROM to >=90% to assist with improved function during instrumental activities of daily living. [address] will improve MMT to >=4+/5 to all UE strengthening to return to PLOF and improve functional tolerance. DISCHARGE GOALS: Time Frame: 8 weeks  1. Corinne Romero will report <=1/10 pain to cervical spine with performance of functional spinal mobility and rotation and minimal to no difficulty with such tasks. [address] will demonstrate improved NDI score, indicating spinal improvement from 14/50 to 10/50 affecting minimal to no difficulty with performance of cervical mobility and strengthening. [address] to be independent with advanced HEP for cervical region and UE's.      Rehabilitation Potential For Stated Goals: Good  Regarding Cristina John's therapy, I certify that the treatment plan above will be carried out by a therapist or under their direction. Thank you for this referral,  Mark Oleary PTA     Referring Physician Signature: MANASA Luo              Date                    HISTORY:   History of Present Injury/Illness (Reason for Referral): At times his L shoulder to upper neck gets tingling. \"It's harder to get relief with even heat or rubs or medication. The pain is every day. The pain is ONLY ON THE LEFT. It travels up his head and heck but does not affect R side. Wife states \"He really got okay with neck after shots. He had L foot surgery and after being home from surgery he developed L arm/neck pain. \"  \"the MD thinks hopefully the therapy will help. The next step is an MRI and then shots. \"  Past Medical History/Comorbidities:   Mr. Lani Azevedo  has a past medical history of Anxiety; Cancer ([address] (2010); Chronic pain; Hyperlipidemia; Hypertension; Morbid obesity ([address] Osteoarthritis; Personal history of prostate cancer; Seizures ([address] (first one 30's); Sleep apnea; and Thromboembolus ([address] (~2009). Mr. Lani Azevedo  has a past surgical history that includes bladder suspension; other surgical; knee arthroscopy; knee replacement; orthopaedic; prostatectomy (12/2010); and cataract removal (Bilateral). Social History/Living Environment:       Social History     Social History    Marital status:      Spouse name: N/A    Number of children: N/A    Years of education: N/A     Occupational History    Not on file. Social History Main Topics    Smoking status: Never Smoker    Smokeless tobacco: Never Used    Alcohol use No    Drug use: No    Sexual activity: Not on file     Other Topics Concern    Not on file     Social History Narrative     Prior Level of Function/Work/Activity:  Independent but with pain---[employer].     Personal Factors:          Sex:  male        Age:  70 y.o.  Current Medications:    Current Outpatient Prescriptions:     PHENobarbital (LUMINAL) 97.2 mg tablet, Take 1 Tab by mouth nightly. Max Daily Amount: 97.2 mg., Disp: 30 Tab, Rfl: 0    pregabalin (LYRICA) 150 mg capsule, Take 1 Cap by mouth two (2) times a day. Max Daily Amount: 300 mg., Disp: 30 Cap, Rfl: 0    MYRBETRIQ 50 mg ER tablet, Take 1 Tab by mouth daily. Indications: URINARY URGENCY, Disp: 90 Tab, Rfl: 4    acetaminophen (TYLENOL) 325 mg tablet, Take 325 mg by mouth as needed for Pain., Disp: , Rfl:     atorvastatin (LIPITOR) 40 mg tablet, Take 40 mg by mouth nightly., Disp: , Rfl:     cholecalciferol, vitamin D3, 2,000 unit tab, Take 2,000 Units by mouth every morning., Disp: , Rfl:     hydrochlorothiazide (HYDRODIURIL) 25 mg tablet, Take 25 mg by mouth every morning., Disp: , Rfl:     cyanocobalamin (VITAMIN B-12) 1,000 mcg sublingual tablet, Take 1,000 mcg by mouth daily. , Disp: , Rfl:     losartan (COZAAR) 100 mg tablet, Take 100 mg by mouth every morning., Disp: , Rfl:     omega-3 fatty acids-vitamin e (FISH OIL) 1,000 mg cap, Take 1 Cap by mouth daily. , Disp: , Rfl:     propranolol LA (INDERAL LA) 160 mg capsule, Take 120 mg by mouth every morning. Indications: HYPERTENSION, Disp: , Rfl:     venlafaxine-SR (EFFEXOR-XR) 75 mg capsule, Take 75 mg by mouth every morning. Indications: MAJOR DEPRESSIVE DISORDER, Disp: , Rfl:     aspirin 81 mg chewable tablet, Take 81 mg by mouth nightly., Disp: , Rfl:     MULTI-VITAMIN PO, Take 1 Tab by mouth daily. , Disp: , Rfl:      Date Last Reviewed:  5/22/2017   Number of Personal Factors/Comorbidities that affect the Plan of Care: 1-2: MODERATE COMPLEXITY   EXAMINATION:   Observation/Orthostatic Postural Assessment: Forward head and shoulders rounded. Palpation:          Minimal to no tenderness L upper trap and posterior cervical mm.   Hypomobility presents C/S.  ROM:    Joint: Eval Date:  Re-Assess Date: Re-Assess Date: Cervical AROM      Flexion (% of normal): 100% with no pain; no pain with OP     Extension (% of normal): 100% with no pain; no pain with OP     L sidebending (% of normal): 80% with discomfort to L side of neck; no pain with OP     R sidebending (% of normal): 100% no pain; no pain with OP     L rotation (% of normal): 70% with hard end feel---restricted; minimal to no discomfort; no pain with OP     R rotation (% of normal): 100% with no pain; no pain with OP. Shoulder ROM All WFL (flexion, abduction, IR, ER) no pain. Pain at end-range or with AROM? Yes/No See above. Any pain with overpressure? Yes/No        Strength:    Joint: Eval Date:  Re-Assess Date:  Re-Assess Date:     RIGHT LEFT RIGHT LEFT RIGHT LEFT   Shoulder flexion: = 5/5 5/5       Shoulder extension: 5/5 5/5       Shoulder abduction: 5/5 5/5       Shoulder IR: 5/5 5/5       Shoulder ER: 5/5 5/5       Elbow flexion: 5/5 5/5       Elbow extension: 5/5 5/5       Wrist flexion/extension: 5/5 5/5         Hx of ankle surgery in January 2017. Has been out of the boot since last week. Special Tests: Assessed @ Initial Visit    Spurling's Test: Positive/Negative----Negative. Pain/numbness down to L shoulder--does not pass elbow. Neurological Screen: Radiating symptoms? Yes/No---Yes between L shoulder and cervical region---does pass up above occipital.  Reports headaches occasionally but not everyday. Functional Mobility:  Assessed @ Initial Visit ---very sedentary individual.  Balance:  Assessed @ Initial Visit      Body Structures Involved:  1. Nerves  2. Bones  3. Joints  4. Muscles  5. Ligaments Body Functions Affected:  1. Sensory/Pain  2. Neuromusculoskeletal  3. Movement Related Activities and Participation Affected:  1. Mobility  2.  Self Care   Number of elements that affect the Plan of Care: 4+: HIGH COMPLEXITY   CLINICAL PRESENTATION:   Presentation: Evolving clinical presentation with changing clinical characteristics: MODERATE COMPLEXITY   CLINICAL DECISION MAKING:   Outcome Measure: Tool Used: Neck Disability Index (NDI)  Score:  Initial: 14/50  Most Recent: X/50 (Date: -- )   Interpretation of Score: The Neck Disability Index is a revised form of the Oswestry Low Back Pain Index and is designed to measure the activities of daily living in person's with neck pain. Each section is scored on a 0-5 scale, 5 representing the greatest disability. The scores of each section are added together for a total score of 50. Score 0 1-10 11-20 21-30 31-40 41-49 50   Modifier CH CI CJ CK CL CM CN     ? Mobility - Walking and Moving Around:     - CURRENT STATUS: CJ - 20%-39% impaired, limited or restricted    - GOAL STATUS: CI - 1%-19% impaired, limited or restricted    - D/C STATUS:  ---------------To be determined---------------    Medical Necessity:   · Skilled intervention continues to be required due to address above deficits affecting participation in basic ADLs and overall functional tolerance. Reason for Services/Other Comments:  · Patient continues to require skilled intervention due to address above deficits affecting participation in basic ADLs and overall functional tolerance. Use of outcome tool(s) and clinical judgement create a POC that gives a: Clear prediction of patient's progress: LOW COMPLEXITY   TREATMENT:   (In addition to Assessment/Re-Assessment sessions the following treatments were rendered)  THERAPEUTIC EXERCISE: ( minutes):  Exercises per grid below to improve mobility, strength, balance and coordination. Required minimal visual and verbal cues to promote proper body alignment and promote proper body posture. Progressed resistance, range, repetitions and complexity of movement as indicated.      Date:  5.5.17 Date:   Date:     Activity/Exercise Parameters Parameters Parameters                                               NOTE: Carmen Camera Alvaro 300#---FOR TRACTION STAY WITHIN 7-10%    Manual Therapy: (10 minutes) patient sitting with left arm supported for STM to left upper trap and upper arm to help decrease tightness and pain and help improve mobility. MODALITIES: (10 minutes) patient sitting with arms supported for moist heat to neck and left shoulder and upper arm to help decrease tightness. (15 minutes)  Cervical traction supine performed with machine--pull 26# force 13# rest for 60 second hold and 15 second rest.  No adverse reactions noted post-traction. (Used abbreviations: MET - muscle energy technique; PNF - proprioceptive neuromuscular facilitation; NMR - neuromuscular re-education; AP - anterior to posterior; PA - posterior to anterior)    Pre Treatment: patient reported that he is not having much pain today just tenderness and soreness over left cervical spine pointing to a point tender area per patient. Patient responding well to treatment with decreased symptoms. Treatment/Session Assessment:  Patient tolerated treatment well with decreased pain. Patient responding well to treatment. Continue per plan of care. · Pain/ Symptoms: Initial:   1/10  Post Session:Patient reported decreased pain but no number given. ·   Compliance with Program/Exercises: Will assess as treatment progresses. · Recommendations/Intent for next treatment session: \"Next visit will focus on advancements to more challenging activities and reduction in assistance provided\".   Total Treatment Duration:  PT Patient Time In/Time Out  Time In: 1345  Time Out: Morgan Marie, PTA

## 2017-05-24 ENCOUNTER — HOSPITAL ENCOUNTER (OUTPATIENT)
Dept: WOUND CARE | Age: 72
Discharge: HOME | End: 2017-05-24
Attending: PODIATRIST
Payer: MEDICARE

## 2017-05-24 PROCEDURE — 87186 SC STD MICRODIL/AGAR DIL: CPT | Performed by: PODIATRIST

## 2017-05-24 PROCEDURE — 99205 OFFICE O/P NEW HI 60 MIN: CPT

## 2017-05-24 PROCEDURE — 87075 CULTR BACTERIA EXCEPT BLOOD: CPT | Performed by: PODIATRIST

## 2017-05-24 PROCEDURE — 87077 CULTURE AEROBIC IDENTIFY: CPT | Performed by: PODIATRIST

## 2017-05-24 PROCEDURE — 99215 OFFICE O/P EST HI 40 MIN: CPT

## 2017-05-24 PROCEDURE — 11042 DBRDMT SUBQ TIS 1ST 20SQCM/<: CPT

## 2017-05-24 PROCEDURE — 87205 SMEAR GRAM STAIN: CPT | Performed by: PODIATRIST

## 2017-05-25 ENCOUNTER — HOSPITAL ENCOUNTER (OUTPATIENT)
Dept: PHYSICAL THERAPY | Age: 72
Discharge: HOME | End: 2017-05-25
Payer: MEDICARE

## 2017-05-25 PROCEDURE — 97012 MECHANICAL TRACTION THERAPY: CPT

## 2017-05-25 NOTE — PROGRESS NOTES
Rolando Augustin  : 1945 [facility] [place] [facility] [address] [address] [facility]  [phone]   [phone]       OUTPATIENT PHYSICAL THERAPY:Daily Note  2017    ICD-10: Treatment Diagnosis: Cervicalgia (M54.2)   RADICULOPATHY, CERVICAL REGION M54.12       Precautions/Allergies:   Adhesive and Bactrim [sulfamethoxazole-trimethoprim]   Fall Risk Score: 2 (? 5 = High Risk)  MD Orders: Eval and Treat: TRACTION MEDICAL/REFERRING DIAGNOSIS:  Radiculopathy, cervical region [M54.12]  Other cervical disc degeneration, mid-cervical region, unspecified level [M50.320]   DATE OF ONSET: 17  REFERRING PHYSICIAN: Darlyn Vogel PA  RETURN PHYSICIAN APPOINTMENT: TBD by patient      INITIAL ASSESSMENT:  Mr. Daiz Freitas has attended 1 physical therapy session including initial evaluation. Diaz Freitas presents with S/S of increased pain, decreased ROM, decreased strength, decreased functional tolerance consistent with S/S of cervical radiculopathy. This is a chronic condition for Diaz Freitas   of which had little response from PT previously. Will begin mechanical traction today. Shot did improve symptoms per Diaz Freitas  . Diaz Freitas will benefit from mechanical traction, home exercise program, therapeutic and postural strengthening exercises, manual therapeutic techniques (ie. Distraction, SOR, myofascial release/soft tissue mobilization) as appropriate to address Rolando John's current condition. Diaz Freitas will benefit from skilled PT (medically necessary) to address above deficits affecting participation in basic ADLs and overall functional tolerance. PROBLEM LIST (Impacting functional limitations):  1. Decreased Strength  2. Decreased ADL/Functional Activities  3. Decreased Transfer Abilities  4. Decreased Ambulation Ability/Technique  5. Decreased Balance  6. Increased Pain  7.  Decreased Activity Tolerance  8. Increased Fatigue  9. Increased Shortness of Breath  10. Decreased [place] with Home Exercise Program INTERVENTIONS PLANNED:  1. Balance Exercise  2. Bed Mobility  3. Cold  4. Cryotherapy  5. Family Education  6. Gait Training  7. Heat  8. Home Exercise Program (HEP)  9. Manual Therapy  10. Neuromuscular Re-education/Strengthening  11. Range of Motion (ROM)  12. Therapeutic Activites  13. Therapeutic Exercise/Strengthening  14. Transfer Training   TREATMENT PLAN:  Effective Dates: 5/5/17 TO 8/5/17. Frequency/Duration: 2 times a week for 12 weeks   GOALS: (Goals have been discussed and agreed upon with patient.)  SHORT-TERM FUNCTIONAL GOALS: Time Frame: 4 weeks  1. Jamil Butt will report <=2/10 pain to cervical spine with performance of functional spinal mobility and rotation. [address] will demonstrate improved NDI score, indicating spinal improvement from 14/50 to 12/50 affecting minimal to no difficulty with performance of cervical mobility and strengthening. [address] to be independent with initial HEP for cervical region and UE's.   [facility] will improve ROM to >=90% to assist with improved function during instrumental activities of daily living. [address] [place] [address] St will improve MMT to >=4+/5 to all UE strengthening to return to PLOF and improve functional tolerance. DISCHARGE GOALS: Time Frame: 8 weeks  1. Jamil Butt will report <=1/10 pain to cervical spine with performance of functional spinal mobility and rotation and minimal to no difficulty with such tasks. [address] [place] [address] St will demonstrate improved NDI score, indicating spinal improvement from 14/50 to 10/50 affecting minimal to no difficulty with performance of cervical mobility and strengthening. [address] to be independent with advanced HEP for cervical region and UE's.      Rehabilitation Potential For Stated Goals: Good  Regarding Gloria John's therapy, I certify that the treatment plan above will be carried out by a therapist or under their direction. Thank you for this referral,  Filemon Cheatham PTA     Referring Physician Signature: MANASA Johnson              Date                    HISTORY:   History of Present Injury/Illness (Reason for Referral): At times his L shoulder to upper neck gets tingling. \"It's harder to get relief with even heat or rubs or medication. The pain is every day. The pain is ONLY ON THE LEFT. It travels up his head and heck but does not affect R side. Wife states \"He really got okay with neck after shots. He had L foot surgery and after being home from surgery he developed L arm/neck pain. \"  \"the MD thinks hopefully the therapy will help. The next step is an MRI and then shots. \"  Past Medical History/Comorbidities:   Mr. Keely Segovia  has a past medical history of Anxiety; Cancer ([address] (2010); Chronic pain; Hyperlipidemia; Hypertension; Morbid obesity ([address] Osteoarthritis; Personal history of prostate cancer; Seizures ([address] (first one 30's); Sleep apnea; and Thromboembolus ([address] (~2009). Mr. Keely Segovia  has a past surgical history that includes bladder suspension; other surgical; knee arthroscopy; knee replacement; orthopaedic; prostatectomy (12/2010); and cataract removal (Bilateral). Social History/Living Environment:       Social History     Social History    Marital status:      Spouse name: N/A    Number of children: N/A    Years of education: N/A     Occupational History    Not on file. Social History Main Topics    Smoking status: Never Smoker    Smokeless tobacco: Never Used    Alcohol use No    Drug use: No    Sexual activity: Not on file     Other Topics Concern    Not on file     Social History Narrative     Prior Level of Function/Work/Activity:  Independent but with pain---VERY SEDENTARY.     Personal Factors:          Sex:  male        Age:  70 y.o.  Current Medications:    Current Outpatient Prescriptions:     PHENobarbital (LUMINAL) 97.2 mg tablet, Take 1 Tab by mouth nightly. Max Daily Amount: 97.2 mg., Disp: 30 Tab, Rfl: 0    pregabalin (LYRICA) 150 mg capsule, Take 1 Cap by mouth two (2) times a day. Max Daily Amount: 300 mg., Disp: 30 Cap, Rfl: 0    MYRBETRIQ 50 mg ER tablet, Take 1 Tab by mouth daily. Indications: URINARY URGENCY, Disp: 90 Tab, Rfl: 4    acetaminophen (TYLENOL) 325 mg tablet, Take 325 mg by mouth as needed for Pain., Disp: , Rfl:     atorvastatin (LIPITOR) 40 mg tablet, Take 40 mg by mouth nightly., Disp: , Rfl:     cholecalciferol, vitamin D3, 2,000 unit tab, Take 2,000 Units by mouth every morning., Disp: , Rfl:     hydrochlorothiazide (HYDRODIURIL) 25 mg tablet, Take 25 mg by mouth every morning., Disp: , Rfl:     cyanocobalamin (VITAMIN B-12) 1,000 mcg sublingual tablet, Take 1,000 mcg by mouth daily. , Disp: , Rfl:     losartan (COZAAR) 100 mg tablet, Take 100 mg by mouth every morning., Disp: , Rfl:     omega-3 fatty acids-vitamin e (FISH OIL) 1,000 mg cap, Take 1 Cap by mouth daily. , Disp: , Rfl:     propranolol LA (INDERAL LA) 160 mg capsule, Take 120 mg by mouth every morning. Indications: HYPERTENSION, Disp: , Rfl:     venlafaxine-SR (EFFEXOR-XR) 75 mg capsule, Take 75 mg by mouth every morning. Indications: MAJOR DEPRESSIVE DISORDER, Disp: , Rfl:     aspirin 81 mg chewable tablet, Take 81 mg by mouth nightly., Disp: , Rfl:     MULTI-VITAMIN PO, Take 1 Tab by mouth daily. , Disp: , Rfl:      Date Last Reviewed:  5/25/2017   Number of Personal Factors/Comorbidities that affect the Plan of Care: 1-2: MODERATE COMPLEXITY   EXAMINATION:   Observation/Orthostatic Postural Assessment: Forward head and shoulders rounded. Palpation:          Minimal to no tenderness L upper trap and posterior cervical mm.   Hypomobility presents C/S.  ROM:    Joint: Eval Date:  Re-Assess Date: Re-Assess Date: Cervical AROM      Flexion (% of normal): 100% with no pain; no pain with OP     Extension (% of normal): 100% with no pain; no pain with OP     L sidebending (% of normal): 80% with discomfort to L side of neck; no pain with OP     R sidebending (% of normal): 100% no pain; no pain with OP     L rotation (% of normal): 70% with hard end feel---restricted; minimal to no discomfort; no pain with OP     R rotation (% of normal): 100% with no pain; no pain with OP. Shoulder ROM All WFL (flexion, abduction, IR, ER) no pain. Pain at end-range or with AROM? Yes/No See above. Any pain with overpressure? Yes/No        Strength:    Joint: Eval Date:  Re-Assess Date:  Re-Assess Date:     RIGHT LEFT RIGHT LEFT RIGHT LEFT   Shoulder flexion: = 5/5 5/5       Shoulder extension: 5/5 5/5       Shoulder abduction: 5/5 5/5       Shoulder IR: 5/5 5/5       Shoulder ER: 5/5 5/5       Elbow flexion: 5/5 5/5       Elbow extension: 5/5 5/5       Wrist flexion/extension: 5/5 5/5         Hx of ankle surgery in January 2017. Has been out of the boot since last week. Special Tests: Assessed @ Initial Visit    Spurling's Test: Positive/Negative----Negative. Pain/numbness down to L shoulder--does not pass elbow. Neurological Screen: Radiating symptoms? Yes/No---Yes between L shoulder and cervical region---does pass up above occipital.  Reports headaches occasionally but not everyday. Functional Mobility:  Assessed @ Initial Visit ---very sedentary individual.  Balance:  Assessed @ Initial Visit      Body Structures Involved:  1. Nerves  2. Bones  3. Joints  4. Muscles  5. Ligaments Body Functions Affected:  1. Sensory/Pain  2. Neuromusculoskeletal  3. Movement Related Activities and Participation Affected:  1. Mobility  2.  Self Care   Number of elements that affect the Plan of Care: 4+: HIGH COMPLEXITY   CLINICAL PRESENTATION:   Presentation: Evolving clinical presentation with changing clinical characteristics: MODERATE COMPLEXITY   CLINICAL DECISION MAKING:   Outcome Measure: Tool Used: Neck Disability Index (NDI)  Score:  Initial: 14/50  Most Recent: X/50 (Date: -- )   Interpretation of Score: The Neck Disability Index is a revised form of the Oswestry Low Back Pain Index and is designed to measure the activities of daily living in person's with neck pain. Each section is scored on a 0-5 scale, 5 representing the greatest disability. The scores of each section are added together for a total score of 50. Score 0 1-10 11-20 21-30 31-40 41-49 50   Modifier CH CI CJ CK CL CM CN     ? Mobility - Walking and Moving Around:     - CURRENT STATUS: CJ - 20%-39% impaired, limited or restricted    - GOAL STATUS: CI - 1%-19% impaired, limited or restricted    - D/C STATUS:  ---------------To be determined---------------    Medical Necessity:   · Skilled intervention continues to be required due to address above deficits affecting participation in basic ADLs and overall functional tolerance. Reason for Services/Other Comments:  · Patient continues to require skilled intervention due to address above deficits affecting participation in basic ADLs and overall functional tolerance. Use of outcome tool(s) and clinical judgement create a POC that gives a: Clear prediction of patient's progress: LOW COMPLEXITY   TREATMENT:   (In addition to Assessment/Re-Assessment sessions the following treatments were rendered)  THERAPEUTIC EXERCISE: ( minutes):  Exercises per grid below to improve mobility, strength, balance and coordination. Required minimal visual and verbal cues to promote proper body alignment and promote proper body posture. Progressed resistance, range, repetitions and complexity of movement as indicated.      Date:  5.5.17 Date:   Date:     Activity/Exercise Parameters Parameters Parameters                                               NOTE: Shantel John 300#---FOR TRACTION STAY WITHIN 7-10%    Manual Therapy:    MODALITIES: (15 minutes) patient sitting with arms supported for moist heat to neck and left shoulder and upper arm to help decrease tightness. (15 minutes)  Cervical traction supine performed with machine--pull 26# force 13# rest for 60 second hold and 15 second rest.  No adverse reactions noted post-traction. (Used abbreviations: MET - muscle energy technique; PNF - proprioceptive neuromuscular facilitation; NMR - neuromuscular re-education; AP - anterior to posterior; PA - posterior to anterior)    Pre Treatment: patient reports no pain today but continues to have tingling in his left shoulder area. Blood pressure: 141/70 and HR 59 O2 sats 95-91% (prior to treatment)  Patient with SOB when walking into treatment room from waiting room. Patient states it is difficult and he does not feel safe walking much at home. Treatment/Session Assessment:  Patient tolerated treatment well no pain following treatment. Patient responding well to treatment. Continue per plan of care. · Pain/ Symptoms: Initial:   0/10 just tingling in left shoulder area Post Session: 0/10 ·   Compliance with Program/Exercises: Will assess as treatment progresses. · Recommendations/Intent for next treatment session: \"Next visit will focus on advancements to more challenging activities and reduction in assistance provided\".   Total Treatment Duration:  PT Patient Time In/Time Out  Time In: 1345  Time Out: [address] [facility] Laverne Marie PTA

## 2017-05-28 LAB
BACTERIA SPEC CULT: ABNORMAL
BACTERIA SPEC CULT: ABNORMAL
GRAM STN SPEC: ABNORMAL
GRAM STN SPEC: ABNORMAL
SERVICE CMNT-IMP: ABNORMAL

## 2017-05-30 ENCOUNTER — HOSPITAL ENCOUNTER (OUTPATIENT)
Dept: PHYSICAL THERAPY | Age: 72
Discharge: HOME | End: 2017-05-30
Payer: MEDICARE

## 2017-05-30 PROCEDURE — G8979 MOBILITY GOAL STATUS: HCPCS

## 2017-05-30 PROCEDURE — 97530 THERAPEUTIC ACTIVITIES: CPT

## 2017-05-30 PROCEDURE — 97012 MECHANICAL TRACTION THERAPY: CPT

## 2017-05-30 PROCEDURE — G8978 MOBILITY CURRENT STATUS: HCPCS

## 2017-05-30 NOTE — PROGRESS NOTES
Michael Duttaušica  : 1945 [facility] at [address], [address] [facility]  [phone]   [phone]       OUTPATIENT PHYSICAL THERAPY:Progress and Daily Note  2017    ICD-10: Treatment Diagnosis: Cervicalgia (M54.2)   RADICULOPATHY, CERVICAL REGION M54.12       Precautions/Allergies:   Adhesive and Bactrim [sulfamethoxazole-trimethoprim]   Fall Risk Score: 2 (? 5 = High Risk)  MD Orders: Eval and Treat: TRACTION MEDICAL/REFERRING DIAGNOSIS:  Radiculopathy, cervical region [M54.12]  Other cervical disc degeneration, mid-cervical region, unspecified level [M50.320]   DATE OF ONSET: 17  REFERRING PHYSICIAN: Darlyn Vogel PA  RETURN PHYSICIAN APPOINTMENT: TBD by patient      INITIAL ASSESSMENT:  Mr. Sarkis Padilla has attended 8 physical therapy session including initial evaluation. Sessions have included cervical traction via traction machine (previous manual traction attempts unsuccessful). He has no pain, just continued tingling to L shoulder area. Goal met with regards to pain as well as Neck Disability Index (how pain affecs ability to manage everyday-life activities). 2 more visits scheduled after today--progressing towards DC. Sarkis Padilla presents with S/S of increased pain, decreased ROM, decreased strength, decreased functional tolerance consistent with S/S of cervical radiculopathy. This is a chronic condition for Sarkis Grahamf   of which had little response from PT previously. Will begin mechanical traction today. Shot did improve symptoms per Sarkis Hof  . Sarkis Hof will benefit from mechanical traction, home exercise program, therapeutic and postural strengthening exercises, manual therapeutic techniques (ie. Distraction, SOR, myofascial release/soft tissue mobilization) as appropriate to address Michael John's current condition.       Sarkis Hof will benefit from skilled PT (medically necessary) to address above deficits affecting participation in basic ADLs and overall functional tolerance. PROBLEM LIST (Impacting functional limitations):  1. Decreased Strength  2. Decreased ADL/Functional Activities  3. Decreased Transfer Abilities  4. Decreased Ambulation Ability/Technique  5. Decreased Balance  6. Increased Pain  7. Decreased Activity Tolerance  8. Increased Fatigue  9. Increased Shortness of Breath  10. Decreased [place] with Home Exercise Program INTERVENTIONS PLANNED:  1. Balance Exercise  2. Bed Mobility  3. Cold  4. Cryotherapy  5. Family Education  6. Gait Training  7. Heat  8. Home Exercise Program (HEP)  9. Manual Therapy  10. Neuromuscular Re-education/Strengthening  11. Range of Motion (ROM)  12. Therapeutic Activites  13. Therapeutic Exercise/Strengthening  14. Transfer Training   TREATMENT PLAN:  Effective Dates: 5/5/17 TO 8/5/17. Frequency/Duration: 2 times a week for 12 weeks   GOALS: (Goals have been discussed and agreed upon with patient.)  Assessed 5.30.17  SHORT-TERM FUNCTIONAL GOALS: Time Frame: 4 weeks  1. Jamil Butt will report <=2/10 pain to cervical spine with performance of functional spinal mobility and rotation. Goal Met 5.30.17  2. Jamil Butt will demonstrate improved NDI score, indicating spinal improvement from 14/50 to 12/50 affecting minimal to no difficulty with performance of cervical mobility and strengthening. Goal Met 5.30.17  3. Ever John to be independent with initial HEP for cervical region and UE's. Goal Met 5.30.17  4. Jamil Butt will improve ROM to >=90% to assist with improved function during instrumental activities of daily living. Oleksandr Yusuf will improve MMT to >=4+/5 to all UE strengthening to return to PLOF and improve functional tolerance. DISCHARGE GOALS: Time Frame: 8 weeks Assessed 5.30.17  1.  Jamil Butt will report <=1/10 pain to cervical spine with performance of functional spinal mobility and rotation and minimal to no difficulty with such tasks. Goal Met 5.30.17  Rohit Quiñonez will demonstrate improved NDI score, indicating spinal improvement from 14/50 to 10/50 affecting minimal to no difficulty with performance of cervical mobility and strengthening. [address] John to be independent with advanced HEP for cervical region and UE's. Goal Met 5.30.17    Rehabilitation Potential For Stated Goals: Good  Regarding Estrella John's therapy, I certify that the treatment plan above will be carried out by a therapist or under their direction. Thank you for this referral,  Kathleen Calvin DPT     Referring Physician Signature: MANASA Bynum             HISTORY:   History of Present Injury/Illness (Reason for Referral): At times his L shoulder to upper neck gets tingling. \"It's harder to get relief with even heat or rubs or medication. The pain is every day. The pain is ONLY ON THE LEFT. It travels up his head and heck but does not affect R side. Wife states \"He really got okay with neck after shots. He had L foot surgery and after being home from surgery he developed L arm/neck pain. \"  \"the MD thinks hopefully the therapy will help. The next step is an MRI and then shots. \"  Past Medical History/Comorbidities:   Mr. Sin Cagle  has a past medical history of Anxiety; Cancer ([address] (2010); Chronic pain; Hyperlipidemia; Hypertension; Morbid obesity ([address] Osteoarthritis; Personal history of prostate cancer; Seizures ([address] (first one 30's); Sleep apnea; and Thromboembolus ([address] (~2009). Mr. Sin Cagle  has a past surgical history that includes bladder suspension; other surgical; knee arthroscopy; knee replacement; orthopaedic; prostatectomy (12/2010); and cataract removal (Bilateral).   Social History/Living Environment:       Social History     Social History    Marital status:      Spouse name: N/A    Number of children: N/A    Years of education: N/A     Occupational History    Not on file. Social History Main Topics    Smoking status: Never Smoker    Smokeless tobacco: Never Used    Alcohol use No    Drug use: No    Sexual activity: Not on file     Other Topics Concern    Not on file     Social History Narrative     Prior Level of Function/Work/Activity:  Independent but with pain---VERY SEDENTARY. Personal Factors:          Sex:  male        Age:  70 y.o. Current Medications:    Current Outpatient Prescriptions:     PHENobarbital (LUMINAL) 97.2 mg tablet, Take 1 Tab by mouth nightly. Max Daily Amount: 97.2 mg., Disp: 30 Tab, Rfl: 0    pregabalin (LYRICA) 150 mg capsule, Take 1 Cap by mouth two (2) times a day. Max Daily Amount: 300 mg., Disp: 30 Cap, Rfl: 0    MYRBETRIQ 50 mg ER tablet, Take 1 Tab by mouth daily. Indications: URINARY URGENCY, Disp: 90 Tab, Rfl: 4    acetaminophen (TYLENOL) 325 mg tablet, Take 325 mg by mouth as needed for Pain., Disp: , Rfl:     atorvastatin (LIPITOR) 40 mg tablet, Take 40 mg by mouth nightly., Disp: , Rfl:     cholecalciferol, vitamin D3, 2,000 unit tab, Take 2,000 Units by mouth every morning., Disp: , Rfl:     hydrochlorothiazide (HYDRODIURIL) 25 mg tablet, Take 25 mg by mouth every morning., Disp: , Rfl:     cyanocobalamin (VITAMIN B-12) 1,000 mcg sublingual tablet, Take 1,000 mcg by mouth daily. , Disp: , Rfl:     losartan (COZAAR) 100 mg tablet, Take 100 mg by mouth every morning., Disp: , Rfl:     omega-3 fatty acids-vitamin e (FISH OIL) 1,000 mg cap, Take 1 Cap by mouth daily. , Disp: , Rfl:     propranolol LA (INDERAL LA) 160 mg capsule, Take 120 mg by mouth every morning. Indications: HYPERTENSION, Disp: , Rfl:     venlafaxine-SR (EFFEXOR-XR) 75 mg capsule, Take 75 mg by mouth every morning.  Indications: MAJOR DEPRESSIVE DISORDER, Disp: , Rfl:     aspirin 81 mg chewable tablet, Take 81 mg by mouth nightly., Disp: , Rfl:     MULTI-VITAMIN PO, Take 1 Tab by mouth daily. , Disp: , Rfl:      Date Last Reviewed:  5/30/2017   Number of Personal Factors/Comorbidities that affect the Plan of Care: 1-2: MODERATE COMPLEXITY   EXAMINATION:   Observation/Orthostatic Postural Assessment: Forward head and shoulders rounded. Palpation:          Minimal to no tenderness L upper trap and posterior cervical mm. Hypomobility presents C/S.  ROM:    Joint: Eval Date:  Re-Assess Date: 5/30/17 Re-Assess Date:         Cervical AROM      Flexion (% of normal): 100% with no pain; no pain with % with no pain; no pain with OP    Extension (% of normal): 100% with no pain; no pain with % with no pain; no pain with OP    L sidebending (% of normal): 80% with discomfort to L side of neck; no pain with OP 85% no pain    R sidebending (% of normal): 100% no pain; no pain with % with no pain; no pain with OP    L rotation (% of normal): 70% with hard end feel---restricted; minimal to no discomfort; no pain with OP 85% no pain    R rotation (% of normal): 100% with no pain; no pain with OP.   100% with no pain; no pain with OP    Shoulder ROM All WFL (flexion, abduction, IR, ER) no pain. WFL, no pain          Pain at end-range or with AROM? Yes/No See above. See above    Any pain with overpressure? Yes/No        Strength:    Joint: Eval Date:  Re-Assess Date:  Re-Assess Date:     RIGHT LEFT RIGHT LEFT RIGHT LEFT   Shoulder flexion: = 5/5 5/5 5/5 5/5     Shoulder extension: 5/5 5/5 5/5 5/5     Shoulder abduction: 5/5 5/5 5/5 5/5     Shoulder IR: 5/5 5/5 5/5 5/5     Shoulder ER: 5/5 5/5 5/5 5/5     Elbow flexion: 5/5 5/5 5/5 5/5     Elbow extension: 5/5 5/5 5/5 5/5     Wrist flexion/extension: 5/5 5/5 5/5 5/5       Hx of ankle surgery in January 2017. Has been out of the boot since last week. Special Tests: Assessed @ Initial Visit ; 5/30/17   Spurling's Test: Positive/Negative----Negative. Pain/numbness down to L shoulder--does not pass elbow. Neurological Screen: Radiating symptoms? Yes/No---Yes between L shoulder and cervical region---does pass up above occipital.  Reports headaches occasionally but not everyday. Functional Mobility:  Assessed @ Initial Visit ---very sedentary individual.  Balance:  Assessed @ Initial Visit      Body Structures Involved:  1. Nerves  2. Bones  3. Joints  4. Muscles  5. Ligaments Body Functions Affected:  1. Sensory/Pain  2. Neuromusculoskeletal  3. Movement Related Activities and Participation Affected:  1. Mobility  2. Self Care   Number of elements that affect the Plan of Care: 4+: HIGH COMPLEXITY   CLINICAL PRESENTATION:   Presentation: Evolving clinical presentation with changing clinical characteristics: MODERATE COMPLEXITY   CLINICAL DECISION MAKING:   Outcome Measure: Tool Used: Neck Disability Index (NDI)  Score:  Initial: 14/50  Most Recent: 12/50 (Date: 5/30/17 )   Interpretation of Score: The Neck Disability Index is a revised form of the Oswestry Low Back Pain Index and is designed to measure the activities of daily living in person's with neck pain. Each section is scored on a 0-5 scale, 5 representing the greatest disability. The scores of each section are added together for a total score of 50. Score 0 1-10 11-20 21-30 31-40 41-49 50   Modifier CH CI CJ CK CL CM CN     ? Mobility - Walking and Moving Around:     - CURRENT STATUS: CJ - 20%-39% impaired, limited or restricted    - GOAL STATUS: CI - 1%-19% impaired, limited or restricted    - D/C STATUS:  ---------------To be determined---------------    Medical Necessity:   · Skilled intervention continues to be required due to address above deficits affecting participation in basic ADLs and overall functional tolerance. Reason for Services/Other Comments:  · Patient continues to require skilled intervention due to address above deficits affecting participation in basic ADLs and overall functional tolerance.    Use of outcome tool(s) and clinical judgement create a POC that gives a: Clear prediction of patient's progress: LOW COMPLEXITY   TREATMENT:   (In addition to Assessment/Re-Assessment sessions the following treatments were rendered)  THERAPEUTIC EXERCISE: ( minutes):  Exercises per grid below to improve mobility, strength, balance and coordination. Required minimal visual and verbal cues to promote proper body alignment and promote proper body posture. Progressed resistance, range, repetitions and complexity of movement as indicated. Date:  5.5.17 Date:   Date:     Activity/Exercise Parameters Parameters Parameters                                               NOTE: [facility] [facility]---FOR TRACTION STAY WITHIN 7-10%    Manual Therapy:    MODALITIES: (8 minutes) patient sitting with arms supported for moist heat to neck and left shoulder and upper arm to help decrease tightness. (30 minutes)  Cervical traction supine performed with machine--pull 27# force 13# rest for 60 second hold and 15 second rest.  No adverse reactions noted post-traction. (Used abbreviations: MET - muscle energy technique; PNF - proprioceptive neuromuscular facilitation; NMR - neuromuscular re-education; AP - anterior to posterior; PA - posterior to anterior)  Therapeutic activity positioning [place] and performance of assessment of ROM/physical abilities. Pre Treatment: patient reports no pain today but continues to have tingling in his left shoulder area. Treatment/Session Assessment:  Patient tolerated treatment well no pain following treatment. Patient responding well to treatment. Continue per plan of care. 2 more visits scheduled after today--progressing towards DC. He really enjoys the [facility] prior to cervical traction. Perspired heavily today. . No SOB. Difficulty with supine to sit--assistance required with performance of activity.     · Pain/ Symptoms: Initial:   0/10 just tingling in left shoulder area Post Session: 0/10 ·   Compliance with Program/Exercises: Will assess as treatment progresses. · Recommendations/Intent for next treatment session: \"Next visit will focus on advancements to more challenging activities and reduction in assistance provided\".   Total Treatment Duration:  PT Patient Time In/Time Out  Time In: 1345  Time Out: [address]

## 2017-05-31 LAB
BACTERIA SPEC CULT: NORMAL
SERVICE CMNT-IMP: NORMAL

## 2017-05-31 PROCEDURE — 11043 DBRDMT MUSC&/FSCA 1ST 20/<: CPT

## 2017-06-02 ENCOUNTER — HOSPITAL ENCOUNTER (OUTPATIENT)
Dept: PHYSICAL THERAPY | Age: 72
Discharge: HOME | End: 2017-06-02
Payer: MEDICARE

## 2017-06-02 PROCEDURE — 97012 MECHANICAL TRACTION THERAPY: CPT

## 2017-06-02 NOTE — PROGRESS NOTES
Hilario Augustin  : 1945 [facility] at [address], [address] [facility]  [phone]   [phone]       OUTPATIENT PHYSICAL THERAPY:Progress and Daily Note  2017    ICD-10: Treatment Diagnosis: Cervicalgia (M54.2)   RADICULOPATHY, CERVICAL REGION M54.12       Precautions/Allergies:   Adhesive and Bactrim [sulfamethoxazole-trimethoprim]   Fall Risk Score: 2 (? 5 = High Risk)  MD Orders: Eval and Treat: TRACTION MEDICAL/REFERRING DIAGNOSIS:  Radiculopathy, cervical region [M54.12]  Other cervical disc degeneration, mid-cervical region, unspecified level [M50.320]   DATE OF ONSET: 17  REFERRING PHYSICIAN: Darlyn Vogel PA  RETURN PHYSICIAN APPOINTMENT: TBD by patient      INITIAL ASSESSMENT:  Mr. Justin Hall has attended 8 physical therapy session including initial evaluation. Sessions have included cervical traction via traction machine (previous manual traction attempts unsuccessful). He has no pain, just continued tingling to L shoulder area. Goal met with regards to pain as well as Neck Disability Index (how pain affecs ability to manage everyday-life activities). 2 more visits scheduled after today--progressing towards CT. Justin Hall presents with S/S of increased pain, decreased ROM, decreased strength, decreased functional tolerance consistent with S/S of cervical radiculopathy. This is a chronic condition for Justin Hall   of which had little response from PT previously. Will begin mechanical traction today. Shot did improve symptoms per Justin Hall  . Justin Hall will benefit from mechanical traction, home exercise program, therapeutic and postural strengthening exercises, manual therapeutic techniques (ie. Distraction, SOR, myofascial release/soft tissue mobilization) as appropriate to address Hilario John's current condition.       Justin Hall will benefit from skilled PT (medically necessary) to address above deficits affecting participation in basic ADLs and overall functional tolerance. PROBLEM LIST (Impacting functional limitations):  1. Decreased Strength  2. Decreased ADL/Functional Activities  3. Decreased Transfer Abilities  4. Decreased Ambulation Ability/Technique  5. Decreased Balance  6. Increased Pain  7. Decreased Activity Tolerance  8. Increased Fatigue  9. Increased Shortness of Breath  10. Decreased [place] with Home Exercise Program INTERVENTIONS PLANNED:  1. Balance Exercise  2. Bed Mobility  3. Cold  4. Cryotherapy  5. Family Education  6. Gait Training  7. Heat  8. Home Exercise Program (HEP)  9. Manual Therapy  10. Neuromuscular Re-education/Strengthening  11. Range of Motion (ROM)  12. Therapeutic Activites  13. Therapeutic Exercise/Strengthening  14. Transfer Training   TREATMENT PLAN:  Effective Dates: 5/5/17 TO 8/5/17. Frequency/Duration: 2 times a week for 12 weeks   GOALS: (Goals have been discussed and agreed upon with patient.)  Assessed 5.30.17  SHORT-TERM FUNCTIONAL GOALS: Time Frame: 4 weeks  1. Jeremias Mcginnis will report <=2/10 pain to cervical spine with performance of functional spinal mobility and rotation. Goal Met 5.30.17  2. Jeremias Mcginnis will demonstrate improved NDI score, indicating spinal improvement from 14/50 to 12/50 affecting minimal to no difficulty with performance of cervical mobility and strengthening. Goal Met 5.30.17  3. Jamila John to be independent with initial HEP for cervical region and UE's. Goal Met 5.30.17  4. Jeremias Mcginnis will improve ROM to >=90% to assist with improved function during instrumental activities of daily living. Oleksandr Yusuf will improve MMT to >=4+/5 to all UE strengthening to return to PLOF and improve functional tolerance. DISCHARGE GOALS: Time Frame: 8 weeks Assessed 5.30.17  1.  Jeremias Mcginnis will report <=1/10 pain to cervical spine with performance of functional spinal mobility and rotation and minimal to no difficulty with such tasks. Goal Met 5.30.17  2. Lacy Primas will demonstrate improved NDI score, indicating spinal improvement from 14/50 to 10/50 affecting minimal to no difficulty with performance of cervical mobility and strengthening. [address] John to be independent with advanced HEP for cervical region and UE's. Goal Met 5.30.17    Rehabilitation Potential For Stated Goals: Good  Regarding Gustavo John's therapy, I certify that the treatment plan above will be carried out by a therapist or under their direction. Thank you for this referral,  Lashawn Aguillon PTA     Referring Physician Signature: MANASA Echols             HISTORY:   History of Present Injury/Illness (Reason for Referral): At times his L shoulder to upper neck gets tingling. \"It's harder to get relief with even heat or rubs or medication. The pain is every day. The pain is ONLY ON THE LEFT. It travels up his head and heck but does not affect R side. Wife states \"He really got okay with neck after shots. He had L foot surgery and after being home from surgery he developed L arm/neck pain. \"  \"the MD thinks hopefully the therapy will help. The next step is an MRI and then shots. \"  Past Medical History/Comorbidities:   Mr. Vikas Paez  has a past medical history of Anxiety; Cancer ([address] (2010); Chronic pain; Hyperlipidemia; Hypertension; Morbid obesity ([address] Osteoarthritis; Personal history of prostate cancer; Seizures ([address] (first one 30's); Sleep apnea; and Thromboembolus ([address] (~2009). Mr. Vikas Paez  has a past surgical history that includes bladder suspension; other surgical; knee arthroscopy; knee replacement; orthopaedic; prostatectomy (12/2010); and cataract removal (Bilateral).   Social History/Living Environment:       Social History     Social History    Marital status:      Spouse name: N/A    Number of children: N/A    Years of education: N/A     Occupational History    Not on file. Social History Main Topics    Smoking status: Never Smoker    Smokeless tobacco: Never Used    Alcohol use No    Drug use: No    Sexual activity: Not on file     Other Topics Concern    Not on file     Social History Narrative     Prior Level of Function/Work/Activity:  Independent but with pain---VERY SEDENTARY. Personal Factors:          Sex:  male        Age:  70 y.o. Current Medications:    Current Outpatient Prescriptions:     PHENobarbital (LUMINAL) 97.2 mg tablet, Take 1 Tab by mouth nightly. Max Daily Amount: 97.2 mg., Disp: 30 Tab, Rfl: 0    pregabalin (LYRICA) 150 mg capsule, Take 1 Cap by mouth two (2) times a day. Max Daily Amount: 300 mg., Disp: 30 Cap, Rfl: 0    MYRBETRIQ 50 mg ER tablet, Take 1 Tab by mouth daily. Indications: URINARY URGENCY, Disp: 90 Tab, Rfl: 4    acetaminophen (TYLENOL) 325 mg tablet, Take 325 mg by mouth as needed for Pain., Disp: , Rfl:     atorvastatin (LIPITOR) 40 mg tablet, Take 40 mg by mouth nightly., Disp: , Rfl:     cholecalciferol, vitamin D3, 2,000 unit tab, Take 2,000 Units by mouth every morning., Disp: , Rfl:     hydrochlorothiazide (HYDRODIURIL) 25 mg tablet, Take 25 mg by mouth every morning., Disp: , Rfl:     cyanocobalamin (VITAMIN B-12) 1,000 mcg sublingual tablet, Take 1,000 mcg by mouth daily. , Disp: , Rfl:     losartan (COZAAR) 100 mg tablet, Take 100 mg by mouth every morning., Disp: , Rfl:     omega-3 fatty acids-vitamin e (FISH OIL) 1,000 mg cap, Take 1 Cap by mouth daily. , Disp: , Rfl:     propranolol LA (INDERAL LA) 160 mg capsule, Take 120 mg by mouth every morning. Indications: HYPERTENSION, Disp: , Rfl:     venlafaxine-SR (EFFEXOR-XR) 75 mg capsule, Take 75 mg by mouth every morning.  Indications: MAJOR DEPRESSIVE DISORDER, Disp: , Rfl:     aspirin 81 mg chewable tablet, Take 81 mg by mouth nightly., Disp: , Rfl:     MULTI-VITAMIN PO, Take 1 Tab by mouth daily. , Disp: , Rfl:      Date Last Reviewed:  6/2/2017   Number of Personal Factors/Comorbidities that affect the Plan of Care: 1-2: MODERATE COMPLEXITY   EXAMINATION:   Observation/Orthostatic Postural Assessment: Forward head and shoulders rounded. Palpation:          Minimal to no tenderness L upper trap and posterior cervical mm. Hypomobility presents C/S.  ROM:    Joint: Eval Date:  Re-Assess Date: 5/30/17 Re-Assess Date:         Cervical AROM      Flexion (% of normal): 100% with no pain; no pain with % with no pain; no pain with OP    Extension (% of normal): 100% with no pain; no pain with % with no pain; no pain with OP    L sidebending (% of normal): 80% with discomfort to L side of neck; no pain with OP 85% no pain    R sidebending (% of normal): 100% no pain; no pain with % with no pain; no pain with OP    L rotation (% of normal): 70% with hard end feel---restricted; minimal to no discomfort; no pain with OP 85% no pain    R rotation (% of normal): 100% with no pain; no pain with OP.   100% with no pain; no pain with OP    Shoulder ROM All WFL (flexion, abduction, IR, ER) no pain. WFL, no pain          Pain at end-range or with AROM? Yes/No See above. See above    Any pain with overpressure? Yes/No        Strength:    Joint: Eval Date:  Re-Assess Date:  Re-Assess Date:     RIGHT LEFT RIGHT LEFT RIGHT LEFT   Shoulder flexion: = 5/5 5/5 5/5 5/5     Shoulder extension: 5/5 5/5 5/5 5/5     Shoulder abduction: 5/5 5/5 5/5 5/5     Shoulder IR: 5/5 5/5 5/5 5/5     Shoulder ER: 5/5 5/5 5/5 5/5     Elbow flexion: 5/5 5/5 5/5 5/5     Elbow extension: 5/5 5/5 5/5 5/5     Wrist flexion/extension: 5/5 5/5 5/5 5/5       Hx of ankle surgery in January 2017. Has been out of the boot since last week. Special Tests: Assessed @ Initial Visit ; 5/30/17   Spurling's Test: Positive/Negative----Negative. Pain/numbness down to L shoulder--does not pass elbow. Neurological Screen: Radiating symptoms? Yes/No---Yes between L shoulder and cervical region---does pass up above occipital.  Reports headaches occasionally but not everyday. Functional Mobility:  Assessed @ Initial Visit ---very sedentary individual.  Balance:  Assessed @ Initial Visit      Body Structures Involved:  1. Nerves  2. Bones  3. Joints  4. Muscles  5. Ligaments Body Functions Affected:  1. Sensory/Pain  2. Neuromusculoskeletal  3. Movement Related Activities and Participation Affected:  1. Mobility  2. Self Care   Number of elements that affect the Plan of Care: 4+: HIGH COMPLEXITY   CLINICAL PRESENTATION:   Presentation: Evolving clinical presentation with changing clinical characteristics: MODERATE COMPLEXITY   CLINICAL DECISION MAKING:   Outcome Measure: Tool Used: Neck Disability Index (NDI)  Score:  Initial: 14/50  Most Recent: 12/50 (Date: 5/30/17 )   Interpretation of Score: The Neck Disability Index is a revised form of the Oswestry Low Back Pain Index and is designed to measure the activities of daily living in person's with neck pain. Each section is scored on a 0-5 scale, 5 representing the greatest disability. The scores of each section are added together for a total score of 50. Score 0 1-10 11-20 21-30 31-40 41-49 50   Modifier CH CI CJ CK CL CM CN     ? Mobility - Walking and Moving Around:     - CURRENT STATUS: CJ - 20%-39% impaired, limited or restricted    - GOAL STATUS: CI - 1%-19% impaired, limited or restricted    - D/C STATUS:  ---------------To be determined---------------    Medical Necessity:   · Skilled intervention continues to be required due to address above deficits affecting participation in basic ADLs and overall functional tolerance. Reason for Services/Other Comments:  · Patient continues to require skilled intervention due to address above deficits affecting participation in basic ADLs and overall functional tolerance.    Use of outcome tool(s) and clinical judgement create a POC that gives a: Clear prediction of patient's progress: LOW COMPLEXITY   TREATMENT:   (In addition to Assessment/Re-Assessment sessions the following treatments were rendered)  THERAPEUTIC EXERCISE: ( minutes):  Exercises per grid below to improve mobility, strength, balance and coordination. Required minimal visual and verbal cues to promote proper body alignment and promote proper body posture. Progressed resistance, range, repetitions and complexity of movement as indicated. Date:  5.5.17 Date:   Date:     Activity/Exercise Parameters Parameters Parameters                                               NOTE: Carmen Camera Alvaro 300#---FOR TRACTION STAY WITHIN 7-10%    Manual Therapy:    MODALITIES: (15 minutes) patient sitting with arms supported for moist heat to neck and left shoulder and upper arm to help decrease tightness. (15 minutes)  Cervical traction supine performed with machine--pull 27# force 13# rest for 60 second hold and 15 second rest.  No adverse reactions noted post-traction. (Used abbreviations: MET - muscle energy technique; PNF - proprioceptive neuromuscular facilitation; NMR - neuromuscular re-education; AP - anterior to posterior; PA - posterior to anterior)      Pre Treatment: patient reports no pain today but continues to have tingling in his left shoulder area. Treatment/Session Assessment:  Patient tolerated treatment well no pain following treatment but continues to report tingling down arm. · Pain/ Symptoms: Initial:   0/10 just tingling in left shoulder area Post Session: 0/10 ·   Compliance with Program/Exercises: Will assess as treatment progresses. · Recommendations/Intent for next treatment session: \"Next visit will focus on advancements to more challenging activities and reduction in assistance provided\".   Total Treatment Duration:  PT Patient Time In/Time Out  Time In: 1300  Time Out: [address], [place]

## 2017-06-05 ENCOUNTER — HOSPITAL ENCOUNTER (OUTPATIENT)
Dept: PHYSICAL THERAPY | Age: 72
Discharge: HOME | End: 2017-06-05
Payer: MEDICARE

## 2017-06-05 PROCEDURE — 97012 MECHANICAL TRACTION THERAPY: CPT

## 2017-06-05 NOTE — PROGRESS NOTES
Rolando Augustin  : 1945 [facility] at [address], [address] [facility]  [phone]   [phone]       OUTPATIENT PHYSICAL THERAPY: Daily Note  2017    ICD-10: Treatment Diagnosis: Cervicalgia (M54.2)   RADICULOPATHY, CERVICAL REGION M54.12       Precautions/Allergies:   Adhesive and Bactrim [sulfamethoxazole-trimethoprim]   Fall Risk Score: 2 (? 5 = High Risk)  MD Orders: Eval and Treat: TRACTION MEDICAL/REFERRING DIAGNOSIS:  Radiculopathy, cervical region [M54.12]  Other cervical disc degeneration, mid-cervical region, unspecified level [M50.320]   DATE OF ONSET: 17  REFERRING PHYSICIAN: Darlyn Vogel PA  RETURN PHYSICIAN APPOINTMENT: TBD by patient      INITIAL ASSESSMENT:  Mr. Diaz Freitas has attended 8 physical therapy session including initial evaluation. Sessions have included cervical traction via traction machine (previous manual traction attempts unsuccessful). He has no pain, just continued tingling to L shoulder area. Goal met with regards to pain as well as Neck Disability Index (how pain affecs ability to manage everyday-life activities). 2 more visits scheduled after today--progressing towards DC. Diaz Freitas presents with S/S of increased pain, decreased ROM, decreased strength, decreased functional tolerance consistent with S/S of cervical radiculopathy. This is a chronic condition for Diaz Freitas   of which had little response from PT previously. Will begin mechanical traction today. Shot did improve symptoms per Diaz Freitas  . Diaz Freitas will benefit from mechanical traction, home exercise program, therapeutic and postural strengthening exercises, manual therapeutic techniques (ie. Distraction, SOR, myofascial release/soft tissue mobilization) as appropriate to address Rolando John's current condition.       Diaz Freitas will benefit from skilled PT (medically necessary) to address above deficits affecting participation in basic ADLs and overall functional tolerance. PROBLEM LIST (Impacting functional limitations):  1. Decreased Strength  2. Decreased ADL/Functional Activities  3. Decreased Transfer Abilities  4. Decreased Ambulation Ability/Technique  5. Decreased Balance  6. Increased Pain  7. Decreased Activity Tolerance  8. Increased Fatigue  9. Increased Shortness of Breath  10. Decreased [facility] with Home Exercise Program INTERVENTIONS PLANNED:  1. Balance Exercise  2. Bed Mobility  3. Cold  4. Cryotherapy  5. Family Education  6. Gait Training  7. Heat  8. Home Exercise Program (HEP)  9. Manual Therapy  10. Neuromuscular Re-education/Strengthening  11. Range of Motion (ROM)  12. Therapeutic Activites  13. Therapeutic Exercise/Strengthening  14. Transfer Training   TREATMENT PLAN:  Effective Dates: 5/5/17 TO 8/5/17. Frequency/Duration: 2 times a week for 12 weeks   GOALS: (Goals have been discussed and agreed upon with patient.)  Assessed 5.30.17  SHORT-TERM FUNCTIONAL GOALS: Time Frame: 4 weeks  1. Janeth Schmidt will report <=2/10 pain to cervical spine with performance of functional spinal mobility and rotation. Goal Met 5.30.17  2. Janeth Schmidt will demonstrate improved NDI score, indicating spinal improvement from 14/50 to 12/50 affecting minimal to no difficulty with performance of cervical mobility and strengthening. Goal Met 5.30.17  3. Nancy John to be independent with initial HEP for cervical region and UE's. Goal Met 5.30.17  4. Janeth Schmidt will improve ROM to >=90% to assist with improved function during instrumental activities of daily living. Oleksandr Yusuf will improve MMT to >=4+/5 to all UE strengthening to return to PLOF and improve functional tolerance. DISCHARGE GOALS: Time Frame: 8 weeks Assessed 5.30.17  1.  Janeth Schmidt will report <=1/10 pain to cervical spine with performance of functional spinal mobility and rotation and minimal to no difficulty with such tasks. Goal Met 5.30.17  Rohit Cadena will demonstrate improved NDI score, indicating spinal improvement from 14/50 to 10/50 affecting minimal to no difficulty with performance of cervical mobility and strengthening. [address] John to be independent with advanced HEP for cervical region and UE's. Goal Met 5.30.17    Rehabilitation Potential For Stated Goals: Good  Regarding Tessa John's therapy, I certify that the treatment plan above will be carried out by a therapist or under their direction. Thank you for this referral,  Vijaya Urbina PTA     Referring Physician Signature: MANASA Kathleen             HISTORY:   History of Present Injury/Illness (Reason for Referral): At times his L shoulder to upper neck gets tingling. \"It's harder to get relief with even heat or rubs or medication. The pain is every day. The pain is ONLY ON THE LEFT. It travels up his head and heck but does not affect R side. Wife states \"He really got okay with neck after shots. He had L foot surgery and after being home from surgery he developed L arm/neck pain. \"  \"the MD thinks hopefully the therapy will help. The next step is an MRI and then shots. \"  Past Medical History/Comorbidities:   Mr. Tre Goyal  has a past medical history of Anxiety; Cancer ([address] (2010); Chronic pain; Hyperlipidemia; Hypertension; Morbid obesity ([address] Osteoarthritis; Personal history of prostate cancer; Seizures ([address] (first one 30's); Sleep apnea; and Thromboembolus ([address] (~2009). Mr. Tre Goyal  has a past surgical history that includes bladder suspension; other surgical; knee arthroscopy; knee replacement; orthopaedic; prostatectomy (12/2010); and cataract removal (Bilateral).   Social History/Living Environment:       Social History     Social History    Marital status:      Spouse name: N/A    Number of children: N/A    Years of education: N/A     Occupational History    Not on file. Social History Main Topics    Smoking status: Never Smoker    Smokeless tobacco: Never Used    Alcohol use No    Drug use: No    Sexual activity: Not on file     Other Topics Concern    Not on file     Social History Narrative     Prior Level of Function/Work/Activity:  Independent but with pain---VERY SEDENTARY. Personal Factors:          Sex:  male        Age:  70 y.o. Current Medications:    Current Outpatient Prescriptions:     PHENobarbital (LUMINAL) 97.2 mg tablet, Take 1 Tab by mouth nightly. Max Daily Amount: 97.2 mg., Disp: 30 Tab, Rfl: 0    pregabalin (LYRICA) 150 mg capsule, Take 1 Cap by mouth two (2) times a day. Max Daily Amount: 300 mg., Disp: 30 Cap, Rfl: 0    MYRBETRIQ 50 mg ER tablet, Take 1 Tab by mouth daily. Indications: URINARY URGENCY, Disp: 90 Tab, Rfl: 4    acetaminophen (TYLENOL) 325 mg tablet, Take 325 mg by mouth as needed for Pain., Disp: , Rfl:     atorvastatin (LIPITOR) 40 mg tablet, Take 40 mg by mouth nightly., Disp: , Rfl:     cholecalciferol, vitamin D3, 2,000 unit tab, Take 2,000 Units by mouth every morning., Disp: , Rfl:     hydrochlorothiazide (HYDRODIURIL) 25 mg tablet, Take 25 mg by mouth every morning., Disp: , Rfl:     cyanocobalamin (VITAMIN B-12) 1,000 mcg sublingual tablet, Take 1,000 mcg by mouth daily. , Disp: , Rfl:     losartan (COZAAR) 100 mg tablet, Take 100 mg by mouth every morning., Disp: , Rfl:     omega-3 fatty acids-vitamin e (FISH OIL) 1,000 mg cap, Take 1 Cap by mouth daily. , Disp: , Rfl:     propranolol LA (INDERAL LA) 160 mg capsule, Take 120 mg by mouth every morning. Indications: HYPERTENSION, Disp: , Rfl:     venlafaxine-SR (EFFEXOR-XR) 75 mg capsule, Take 75 mg by mouth every morning.  Indications: MAJOR DEPRESSIVE DISORDER, Disp: , Rfl:     aspirin 81 mg chewable tablet, Take 81 mg by mouth nightly., Disp: , Rfl:     MULTI-VITAMIN PO, Take 1 Tab by mouth daily. , Disp: , Rfl:      Date Last Reviewed:  6/5/2017   Number of Personal Factors/Comorbidities that affect the Plan of Care: 1-2: MODERATE COMPLEXITY   EXAMINATION:   Observation/Orthostatic Postural Assessment: Forward head and shoulders rounded. Palpation:          Minimal to no tenderness L upper trap and posterior cervical mm. Hypomobility presents C/S.  ROM:    Joint: Eval Date:  Re-Assess Date: 5/30/17 Re-Assess Date:         Cervical AROM      Flexion (% of normal): 100% with no pain; no pain with % with no pain; no pain with OP    Extension (% of normal): 100% with no pain; no pain with % with no pain; no pain with OP    L sidebending (% of normal): 80% with discomfort to L side of neck; no pain with OP 85% no pain    R sidebending (% of normal): 100% no pain; no pain with % with no pain; no pain with OP    L rotation (% of normal): 70% with hard end feel---restricted; minimal to no discomfort; no pain with OP 85% no pain    R rotation (% of normal): 100% with no pain; no pain with OP.   100% with no pain; no pain with OP    Shoulder ROM All WFL (flexion, abduction, IR, ER) no pain. WFL, no pain          Pain at end-range or with AROM? Yes/No See above. See above    Any pain with overpressure? Yes/No        Strength:    Joint: Eval Date:  Re-Assess Date:  Re-Assess Date:     RIGHT LEFT RIGHT LEFT RIGHT LEFT   Shoulder flexion: = 5/5 5/5 5/5 5/5     Shoulder extension: 5/5 5/5 5/5 5/5     Shoulder abduction: 5/5 5/5 5/5 5/5     Shoulder IR: 5/5 5/5 5/5 5/5     Shoulder ER: 5/5 5/5 5/5 5/5     Elbow flexion: 5/5 5/5 5/5 5/5     Elbow extension: 5/5 5/5 5/5 5/5     Wrist flexion/extension: 5/5 5/5 5/5 5/5       Hx of ankle surgery in January 2017. Has been out of the boot since last week. Special Tests: Assessed @ Initial Visit ; 5/30/17   Spurling's Test: Positive/Negative----Negative. Pain/numbness down to L shoulder--does not pass elbow.       Neurological Screen: Radiating symptoms? Yes/No---Yes between L shoulder and cervical region---does pass up above occipital.  Reports headaches occasionally but not everyday. Functional Mobility:  Assessed @ Initial Visit ---very sedentary individual.  Balance:  Assessed @ Initial Visit      Body Structures Involved:  1. Nerves  2. Bones  3. Joints  4. Muscles  5. Ligaments Body Functions Affected:  1. Sensory/Pain  2. Neuromusculoskeletal  3. Movement Related Activities and Participation Affected:  1. Mobility  2. Self Care   Number of elements that affect the Plan of Care: 4+: HIGH COMPLEXITY   CLINICAL PRESENTATION:   Presentation: Evolving clinical presentation with changing clinical characteristics: MODERATE COMPLEXITY   CLINICAL DECISION MAKING:   Outcome Measure: Tool Used: Neck Disability Index (NDI)  Score:  Initial: 14/50  Most Recent: 12/50 (Date: 5/30/17 )   Interpretation of Score: The Neck Disability Index is a revised form of the Oswestry Low Back Pain Index and is designed to measure the activities of daily living in person's with neck pain. Each section is scored on a 0-5 scale, 5 representing the greatest disability. The scores of each section are added together for a total score of 50. Score 0 1-10 11-20 21-30 31-40 41-49 50   Modifier CH CI CJ CK CL CM CN     ? Mobility - Walking and Moving Around:     - CURRENT STATUS: CJ - 20%-39% impaired, limited or restricted    - GOAL STATUS: CI - 1%-19% impaired, limited or restricted    - D/C STATUS:  ---------------To be determined---------------    Medical Necessity:   · Skilled intervention continues to be required due to address above deficits affecting participation in basic ADLs and overall functional tolerance. Reason for Services/Other Comments:  · Patient continues to require skilled intervention due to address above deficits affecting participation in basic ADLs and overall functional tolerance.    Use of outcome tool(s) and clinical judgement create a POC that gives a: Clear prediction of patient's progress: LOW COMPLEXITY   TREATMENT:   (In addition to Assessment/Re-Assessment sessions the following treatments were rendered)  THERAPEUTIC EXERCISE: ( minutes):  Exercises per grid below to improve mobility, strength, balance and coordination. Required minimal visual and verbal cues to promote proper body alignment and promote proper body posture. Progressed resistance, range, repetitions and complexity of movement as indicated. Date:  5.5.17 Date:   Date:     Activity/Exercise Parameters Parameters Parameters                                               NOTE: Cristina John 300#---FOR TRACTION STAY WITHIN 7-10%    Manual Therapy:    MODALITIES: (15 minutes) patient sitting with arms supported for moist heat to neck and left shoulder and upper arm to help decrease tightness. (15 minutes)  Cervical traction supine performed with machine--pull 27# force 13# rest for 60 second hold and 15 second rest.  No adverse reactions noted post-traction. (Used abbreviations: MET - muscle energy technique; PNF - proprioceptive neuromuscular facilitation; NMR - neuromuscular re-education; AP - anterior to posterior; PA - posterior to anterior)      Pre Treatment: patient reports pain to be 0/10. Treatment/Session Assessment:  Patient tolerated treatment well no pain following treatment but continues to report tingling down arm. · Pain/ Symptoms: Initial:   0/10 just tingling in left shoulder area Post Session: 0/10 ·   Compliance with Program/Exercises: Will assess as treatment progresses. · Recommendations/Intent for next treatment session: \"Next visit will focus on advancements to more challenging activities and reduction in assistance provided\".   Total Treatment Duration: 35 minutes   PT Patient Time In/Time Out  Time In: 6525  Time Out: [address] [place], [place]

## 2017-06-06 LAB — HBA1C MFR BLD: NORMAL %

## 2017-06-07 ENCOUNTER — HOSPITAL ENCOUNTER (OUTPATIENT)
Dept: WOUND CARE | Age: 72
Discharge: HOME | End: 2017-06-07
Attending: PODIATRIST
Payer: MEDICARE

## 2017-06-07 PROCEDURE — 11042 DBRDMT SUBQ TIS 1ST 20SQCM/<: CPT

## 2017-06-07 PROCEDURE — 77030020057 HC DRSG MTRX CLLGN STGN -B

## 2017-06-07 PROCEDURE — 99213 OFFICE O/P EST LOW 20 MIN: CPT

## 2017-06-08 ENCOUNTER — HOSPITAL ENCOUNTER (OUTPATIENT)
Dept: PHYSICAL THERAPY | Age: 72
Discharge: HOME | End: 2017-06-08
Payer: MEDICARE

## 2017-06-08 PROCEDURE — 97012 MECHANICAL TRACTION THERAPY: CPT

## 2017-06-08 NOTE — PROGRESS NOTES
Jose Enrique Augustin  : 1945 [facility] [place] [address] [address] [facility]  [phone]   [phone]       OUTPATIENT PHYSICAL THERAPY: Daily Note  2017    ICD-10: Treatment Diagnosis: Cervicalgia (M54.2)   RADICULOPATHY, CERVICAL REGION M54.12       Precautions/Allergies:   Adhesive and Bactrim [sulfamethoxazole-trimethoprim]   Fall Risk Score: 2 (? 5 = High Risk)  MD Orders: Eval and Treat: TRACTION MEDICAL/REFERRING DIAGNOSIS:  Radiculopathy, cervical region [M54.12]  Other cervical disc degeneration, mid-cervical region, unspecified level [M50.320]   DATE OF ONSET: 17  REFERRING PHYSICIAN: Darlyn Vogel PA  RETURN PHYSICIAN APPOINTMENT: TBD by patient      INITIAL ASSESSMENT:  Mr. Vianey Claros has attended 8 physical therapy session including initial evaluation. Sessions have included cervical traction via traction machine (previous manual traction attempts unsuccessful). He has no pain, just continued tingling to L shoulder area. Goal met with regards to pain as well as Neck Disability Index (how pain affecs ability to manage everyday-life activities). 2 more visits scheduled after today--progressing towards DC. Vianey Claros presents with S/S of increased pain, decreased ROM, decreased strength, decreased functional tolerance consistent with S/S of cervical radiculopathy. This is a chronic condition for Vianey Claros   of which had little response from PT previously. Will begin mechanical traction today. Shot did improve symptoms per Vianey Claros  . Vianey Claros will benefit from mechanical traction, home exercise program, therapeutic and postural strengthening exercises, manual therapeutic techniques (ie. Distraction, SOR, myofascial release/soft tissue mobilization) as appropriate to address Jose Enrique John's current condition.       Vianey Claros will benefit from skilled PT (medically necessary) to address above deficits affecting participation in basic ADLs and overall functional tolerance. PROBLEM LIST (Impacting functional limitations):  1. Decreased Strength  2. Decreased ADL/Functional Activities  3. Decreased Transfer Abilities  4. Decreased Ambulation Ability/Technique  5. Decreased Balance  6. Increased Pain  7. Decreased Activity Tolerance  8. Increased Fatigue  9. Increased Shortness of Breath  10. Decreased [place] with Home Exercise Program INTERVENTIONS PLANNED:  1. Balance Exercise  2. Bed Mobility  3. Cold  4. Cryotherapy  5. Family Education  6. Gait Training  7. Heat  8. Home Exercise Program (HEP)  9. Manual Therapy  10. Neuromuscular Re-education/Strengthening  11. Range of Motion (ROM)  12. Therapeutic Activites  13. Therapeutic Exercise/Strengthening  14. Transfer Training   TREATMENT PLAN:  Effective Dates: 5/5/17 TO 8/5/17. Frequency/Duration: 2 times a week for 12 weeks   GOALS: (Goals have been discussed and agreed upon with patient.)  Assessed 5.30.17  SHORT-TERM FUNCTIONAL GOALS: Time Frame: 4 weeks  1. Feli De Los Santos will report <=2/10 pain to cervical spine with performance of functional spinal mobility and rotation. Goal Met 5.30.17  2. Feli De Los Santos will demonstrate improved NDI score, indicating spinal improvement from 14/50 to 12/50 affecting minimal to no difficulty with performance of cervical mobility and strengthening. Goal Met 5.30.17  3. Teresa John to be independent with initial HEP for cervical region and UE's. Goal Met 5.30.17  4. Feli De Los Santos will improve ROM to >=90% to assist with improved function during instrumental activities of daily living. Oleksandr Yusuf will improve MMT to >=4+/5 to all UE strengthening to return to PLOF and improve functional tolerance. DISCHARGE GOALS: Time Frame: 8 weeks Assessed 5.30.17  1.  Feli De Los Santos will report <=1/10 pain to cervical spine with performance of functional spinal mobility and rotation and minimal to no difficulty with such tasks. Goal Met 5.30.17  2. Sidney Crisostomo will demonstrate improved NDI score, indicating spinal improvement from 14/50 to 10/50 affecting minimal to no difficulty with performance of cervical mobility and strengthening. [address] John to be independent with advanced HEP for cervical region and UE's. Goal Met 5.30.17    Rehabilitation Potential For Stated Goals: Good  Regarding Robert John's therapy, I certify that the treatment plan above will be carried out by a therapist or under their direction. Thank you for this referral,  Saundra Todd PTA     Referring Physician Signature: MANASA Guevara             HISTORY:   History of Present Injury/Illness (Reason for Referral): At times his L shoulder to upper neck gets tingling. \"It's harder to get relief with even heat or rubs or medication. The pain is every day. The pain is ONLY ON THE LEFT. It travels up his head and heck but does not affect R side. Wife states \"He really got okay with neck after shots. He had L foot surgery and after being home from surgery he developed L arm/neck pain. \"  \"the MD thinks hopefully the therapy will help. The next step is an MRI and then shots. \"  Past Medical History/Comorbidities:   Mr. Dayna Dawn  has a past medical history of Anxiety; Cancer ([address] (2010); Chronic pain; Hyperlipidemia; Hypertension; Morbid obesity ([address] Osteoarthritis; Personal history of prostate cancer; Seizures ([address] (first one 30's); Sleep apnea; and Thromboembolus ([address] (~2009). Mr. Dayna Dawn  has a past surgical history that includes bladder suspension; other surgical; knee arthroscopy; knee replacement; orthopaedic; prostatectomy (12/2010); and cataract removal (Bilateral).   Social History/Living Environment:       Social History     Social History    Marital status:      Spouse name: N/A    Number of children: N/A  Years of education: N/A     Occupational History    Not on file. Social History Main Topics    Smoking status: Never Smoker    Smokeless tobacco: Never Used    Alcohol use No    Drug use: No    Sexual activity: Not on file     Other Topics Concern    Not on file     Social History Narrative     Prior Level of Function/Work/Activity:  Independent but with pain---VERY SEDENTARY. Personal Factors:          Sex:  male        Age:  70 y.o. Current Medications:    Current Outpatient Prescriptions:     PHENobarbital (LUMINAL) 97.2 mg tablet, Take 1 Tab by mouth nightly. Max Daily Amount: 97.2 mg., Disp: 30 Tab, Rfl: 0    pregabalin (LYRICA) 150 mg capsule, Take 1 Cap by mouth two (2) times a day. Max Daily Amount: 300 mg., Disp: 30 Cap, Rfl: 0    MYRBETRIQ 50 mg ER tablet, Take 1 Tab by mouth daily. Indications: URINARY URGENCY, Disp: 90 Tab, Rfl: 4    acetaminophen (TYLENOL) 325 mg tablet, Take 325 mg by mouth as needed for Pain., Disp: , Rfl:     atorvastatin (LIPITOR) 40 mg tablet, Take 40 mg by mouth nightly., Disp: , Rfl:     cholecalciferol, vitamin D3, 2,000 unit tab, Take 2,000 Units by mouth every morning., Disp: , Rfl:     hydrochlorothiazide (HYDRODIURIL) 25 mg tablet, Take 25 mg by mouth every morning., Disp: , Rfl:     cyanocobalamin (VITAMIN B-12) 1,000 mcg sublingual tablet, Take 1,000 mcg by mouth daily. , Disp: , Rfl:     losartan (COZAAR) 100 mg tablet, Take 100 mg by mouth every morning., Disp: , Rfl:     omega-3 fatty acids-vitamin e (FISH OIL) 1,000 mg cap, Take 1 Cap by mouth daily. , Disp: , Rfl:     propranolol LA (INDERAL LA) 160 mg capsule, Take 120 mg by mouth every morning. Indications: HYPERTENSION, Disp: , Rfl:     venlafaxine-SR (EFFEXOR-XR) 75 mg capsule, Take 75 mg by mouth every morning.  Indications: MAJOR DEPRESSIVE DISORDER, Disp: , Rfl:     aspirin 81 mg chewable tablet, Take 81 mg by mouth nightly., Disp: , Rfl:     MULTI-VITAMIN PO, Take 1 Tab by mouth daily., Disp: , Rfl:      Date Last Reviewed:  6/8/2017   Number of Personal Factors/Comorbidities that affect the Plan of Care: 1-2: MODERATE COMPLEXITY   EXAMINATION:   Observation/Orthostatic Postural Assessment: Forward head and shoulders rounded. Palpation:          Minimal to no tenderness L upper trap and posterior cervical mm. Hypomobility presents C/S.  ROM:    Joint: Eval Date:  Re-Assess Date: 5/30/17 Re-Assess Date:         Cervical AROM      Flexion (% of normal): 100% with no pain; no pain with % with no pain; no pain with OP    Extension (% of normal): 100% with no pain; no pain with % with no pain; no pain with OP    L sidebending (% of normal): 80% with discomfort to L side of neck; no pain with OP 85% no pain    R sidebending (% of normal): 100% no pain; no pain with % with no pain; no pain with OP    L rotation (% of normal): 70% with hard end feel---restricted; minimal to no discomfort; no pain with OP 85% no pain    R rotation (% of normal): 100% with no pain; no pain with OP.   100% with no pain; no pain with OP    Shoulder ROM All WFL (flexion, abduction, IR, ER) no pain. WFL, no pain          Pain at end-range or with AROM? Yes/No See above. See above    Any pain with overpressure? Yes/No        Strength:    Joint: Eval Date:  Re-Assess Date:  Re-Assess Date:     RIGHT LEFT RIGHT LEFT RIGHT LEFT   Shoulder flexion: = 5/5 5/5 5/5 5/5     Shoulder extension: 5/5 5/5 5/5 5/5     Shoulder abduction: 5/5 5/5 5/5 5/5     Shoulder IR: 5/5 5/5 5/5 5/5     Shoulder ER: 5/5 5/5 5/5 5/5     Elbow flexion: 5/5 5/5 5/5 5/5     Elbow extension: 5/5 5/5 5/5 5/5     Wrist flexion/extension: 5/5 5/5 5/5 5/5       Hx of ankle surgery in January 2017. Has been out of the boot since last week. Special Tests: Assessed @ Initial Visit ; 5/30/17   Spurling's Test: Positive/Negative----Negative. Pain/numbness down to L shoulder--does not pass elbow.       Neurological Screen: Radiating symptoms? Yes/No---Yes between L shoulder and cervical region---does pass up above occipital.  Reports headaches occasionally but not everyday. Functional Mobility:  Assessed @ Initial Visit ---very sedentary individual.  Balance:  Assessed @ Initial Visit      Body Structures Involved:  1. Nerves  2. Bones  3. Joints  4. Muscles  5. Ligaments Body Functions Affected:  1. Sensory/Pain  2. Neuromusculoskeletal  3. Movement Related Activities and Participation Affected:  1. Mobility  2. Self Care   Number of elements that affect the Plan of Care: 4+: HIGH COMPLEXITY   CLINICAL PRESENTATION:   Presentation: Evolving clinical presentation with changing clinical characteristics: MODERATE COMPLEXITY   CLINICAL DECISION MAKING:   Outcome Measure: Tool Used: Neck Disability Index (NDI)  Score:  Initial: 14/50  Most Recent: 12/50 (Date: 5/30/17 )   Interpretation of Score: The Neck Disability Index is a revised form of the Oswestry Low Back Pain Index and is designed to measure the activities of daily living in person's with neck pain. Each section is scored on a 0-5 scale, 5 representing the greatest disability. The scores of each section are added together for a total score of 50. Score 0 1-10 11-20 21-30 31-40 41-49 50   Modifier CH CI CJ CK CL CM CN     ? Mobility - Walking and Moving Around:     - CURRENT STATUS: CJ - 20%-39% impaired, limited or restricted    - GOAL STATUS: CI - 1%-19% impaired, limited or restricted    - D/C STATUS:  ---------------To be determined---------------    Medical Necessity:   · Skilled intervention continues to be required due to address above deficits affecting participation in basic ADLs and overall functional tolerance. Reason for Services/Other Comments:  · Patient continues to require skilled intervention due to address above deficits affecting participation in basic ADLs and overall functional tolerance.    Use of outcome tool(s) and clinical judgement create a POC that gives a: Clear prediction of patient's progress: LOW COMPLEXITY   TREATMENT:   (In addition to Assessment/Re-Assessment sessions the following treatments were rendered)  THERAPEUTIC EXERCISE: ( minutes):  Exercises per grid below to improve mobility, strength, balance and coordination. Required minimal visual and verbal cues to promote proper body alignment and promote proper body posture. Progressed resistance, range, repetitions and complexity of movement as indicated. Date:  5.5.17 Date:   Date:     Activity/Exercise Parameters Parameters Parameters                                               NOTE: Jamila John 300#---FOR TRACTION STAY WITHIN 7-10%    Manual Therapy:    MODALITIES: (15 minutes) patient sitting with arms supported for moist heat to neck and left shoulder and upper arm to help decrease tightness prior to traction. (15 minutes)  Cervical traction supine performed with machine--pull 27# force 13# rest for 60 second hold and 15 second rest.  No adverse reactions noted post-traction. (Used abbreviations: MET - muscle energy technique; PNF - proprioceptive neuromuscular facilitation; NMR - neuromuscular re-education; AP - anterior to posterior; PA - posterior to anterior)      Pre Treatment: patient reports pain to be 0/10. Treatment/Session Assessment:  Patient tolerated treatment well no pain following treatment but continues to report tingling down arm. Patient states he is going to see MD next week and will discuss with MD if he shoulder continue with PT and traction. Patient appears to have reached a plateau at this time with pain resolved but tingling continues in left shoulder area. Will wait to hear from MD/patient after patient sees MD next Wednesday. · Pain/ Symptoms: Initial:   0/10 just tingling in left shoulder area Post Session: 0/10 ·   Compliance with Program/Exercises:  Will assess as treatment progresses. · Recommendations/Intent for next treatment session: \"Next visit will focus on advancements to more challenging activities and reduction in assistance provided\".   Total Treatment Duration: 35 minutes   PT Patient Time In/Time Out  Time In: 1345  Time Out: [address] [facility] Laverne Marie PTA

## 2017-06-14 PROCEDURE — 87077 CULTURE AEROBIC IDENTIFY: CPT | Performed by: ORTHOPAEDIC SURGERY

## 2017-06-14 PROCEDURE — 87186 SC STD MICRODIL/AGAR DIL: CPT | Performed by: ORTHOPAEDIC SURGERY

## 2017-06-14 PROCEDURE — 99213 OFFICE O/P EST LOW 20 MIN: CPT

## 2017-06-14 PROCEDURE — 11042 DBRDMT SUBQ TIS 1ST 20SQCM/<: CPT

## 2017-06-14 PROCEDURE — 87205 SMEAR GRAM STAIN: CPT | Performed by: ORTHOPAEDIC SURGERY

## 2017-06-14 PROCEDURE — 87075 CULTR BACTERIA EXCEPT BLOOD: CPT | Performed by: ORTHOPAEDIC SURGERY

## 2017-06-21 LAB
BACTERIA SPEC CULT: NORMAL
SERVICE CMNT-IMP: NORMAL

## 2017-06-21 PROCEDURE — 11042 DBRDMT SUBQ TIS 1ST 20SQCM/<: CPT

## 2017-06-22 ENCOUNTER — HOSPITAL ENCOUNTER (EMERGENCY)
Age: 72
Discharge: HOME | End: 2017-06-22
Attending: EMERGENCY MEDICINE
Payer: MEDICARE

## 2017-06-22 VITALS
DIASTOLIC BLOOD PRESSURE: 61 MMHG | HEART RATE: 54 BPM | WEIGHT: 298 LBS | TEMPERATURE: 98.3 F | OXYGEN SATURATION: 95 % | SYSTOLIC BLOOD PRESSURE: 152 MMHG | BODY MASS INDEX: 40.36 KG/M2 | HEIGHT: 72 IN | RESPIRATION RATE: 17 BRPM

## 2017-06-22 DIAGNOSIS — S91.002A ANKLE WOUND, LEFT, INITIAL ENCOUNTER: Primary | ICD-10-CM

## 2017-06-22 PROCEDURE — 99282 EMERGENCY DEPT VISIT SF MDM: CPT | Performed by: EMERGENCY MEDICINE

## 2017-06-22 NOTE — ED PROVIDER NOTES
HPI Comments: January he had surgery on his right ankle. He had an area of wound failure that has been managed by wound clinic.  3 weeks ago he was on some Cipro. Wife brings pictures which showed a large eschar that actually has dramatically improved. They became concerned after referral to infectious disease  That will happen in early July that he should be seen sooner though no acute change had occurred. No drainage from the wound. Use a Dakin solution twice a day to re-dress the wound. Patient is a 70 y.o. male presenting with foot pain. The history is provided by the patient. Foot Pain    This is a chronic problem. The problem occurs daily. The problem has not changed since onset. The pain is present in the left foot. The pain is mild. Pertinent negatives include no numbness and full range of motion. He has tried nothing for the symptoms.         Past Medical History:   Diagnosis Date    Anxiety     daily meds    Cancer ([address] 2010    prostate-surg only    Chronic pain     all over     Hyperlipidemia     treats with medication    Hypertension     controlled with meds    Morbid obesity ([address]     BMI 40.6    Osteoarthritis     generalized    Personal history of prostate cancer     Seizures ([address] first one 29's    last one age 43, controlled with meds    Sleep apnea     uses CPAP    Thromboembolus ([address] ~2009    after sx, left lower extremity       Past Surgical History:   Procedure Laterality Date    HX BLADDER SUSPENSION      HX CATARACT REMOVAL Bilateral     HX KNEE ARTHROSCOPY      on right in 2005 and left in 2006    HX KNEE REPLACEMENT      to L    HX ORTHOPAEDIC      ankle left    HX OTHER SURGICAL      skin cancer removed from top of head    HX PROSTATECTOMY  12/2010         Family History:   Problem Relation Age of Onset    Diabetes Father     Heart Disease Father     Stroke Mother     Hypertension Mother     Hypertension Sister     Malignant Hyperthermia Neg Hx     Pseudocholinesterase Deficiency Neg Hx     Delayed Awakening Neg Hx     Post-op Nausea/Vomiting Neg Hx     Emergence Delirium Neg Hx     Post-op Cognitive Dysfunction Neg Hx     Other Neg Hx        Social History     Social History    Marital status:      Spouse name: N/A    Number of children: N/A    Years of education: N/A     Occupational History    Not on file. Social History Main Topics    Smoking status: Never Smoker    Smokeless tobacco: Never Used    Alcohol use No    Drug use: No    Sexual activity: Not on file     Other Topics Concern    Not on file     Social History Narrative         ALLERGIES: Adhesive and Bactrim [sulfamethoxazole-trimethoprim]    Review of Systems   Constitutional: Negative for chills and fever. Respiratory: Negative. Genitourinary: Negative. Neurological: Negative. Negative for numbness. All other systems reviewed and are negative. Vitals:    06/22/17 1320   BP: 152/61   Pulse: (!) 54   Resp: 17   Temp: 98.3 °F (36.8 °C)   SpO2: 95%   Weight: 135.2 kg (298 lb)   Height: 6' (1.829 m)            Physical Exam   Constitutional: He appears well-developed and well-nourished. No distress. HENT:   Head: Atraumatic. Eyes: No scleral icterus. Neck: Neck supple. Cardiovascular: Normal rate. Pulmonary/Chest: Effort normal. No respiratory distress. Musculoskeletal: Normal range of motion. Neurological: He is alert. Skin: Skin is warm and dry. Small healthy open area left medial ankle. No bone or deep structures open. Actually looks very healthy though by history slowly healing   Psychiatric: Thought content normal.   Nursing note and vitals reviewed. MDM  Number of Diagnoses or Management Options  Ankle wound, left, initial encounter:   Diagnosis management comments: Actually patient has very clean appearing wound. No surrounding cellulitis. No drainage at present. Those slow the progress appears to be very appropriate. Amount and/or Complexity of Data Reviewed  Decide to obtain previous medical records or to obtain history from someone other than the patient: yes  Obtain history from someone other than the patient: yes (wife)    Risk of Complications, Morbidity, and/or Mortality  Presenting problems: moderate  Diagnostic procedures: minimal  Management options: low    Patient Progress  Patient progress: stable    ED Course       Procedures

## 2017-06-22 NOTE — ED NOTES
Wound wrapped with non-stick adhesive and gauze at this time. I have reviewed discharge instructions with the patient. The patient verbalized understanding.

## 2017-06-28 PROCEDURE — 11042 DBRDMT SUBQ TIS 1ST 20SQCM/<: CPT

## 2017-06-28 PROCEDURE — 99214 OFFICE O/P EST MOD 30 MIN: CPT

## 2017-07-05 ENCOUNTER — HOSPITAL ENCOUNTER (OUTPATIENT)
Dept: GENERAL RADIOLOGY | Age: 72
Discharge: HOME | End: 2017-07-05
Payer: MEDICARE

## 2017-07-05 ENCOUNTER — HOSPITAL ENCOUNTER (OUTPATIENT)
Dept: WOUND CARE | Age: 72
Discharge: HOME | End: 2017-07-05
Attending: PODIATRIST
Payer: MEDICARE

## 2017-07-05 DIAGNOSIS — T81.31XA DISRUPTION OF EXTERNAL OPERATION (SURGICAL) WOUND: ICD-10-CM

## 2017-07-05 PROCEDURE — 99213 OFFICE O/P EST LOW 20 MIN: CPT

## 2017-07-05 PROCEDURE — 97597 DBRDMT OPN WND 1ST 20 CM/<: CPT

## 2017-07-12 PROCEDURE — 77030020057 HC DRSG MTRX CLLGN STGN -B

## 2017-07-12 PROCEDURE — 97597 DBRDMT OPN WND 1ST 20 CM/<: CPT

## 2017-07-12 PROCEDURE — 99213 OFFICE O/P EST LOW 20 MIN: CPT

## 2017-07-19 PROCEDURE — 11042 DBRDMT SUBQ TIS 1ST 20SQCM/<: CPT

## 2017-07-20 ENCOUNTER — HOSPITAL ENCOUNTER (OUTPATIENT)
Dept: LAB | Age: 72
Discharge: HOME | End: 2017-07-20
Payer: MEDICARE

## 2017-07-20 LAB
ALBUMIN SERPL BCP-MCNC: 3.5 G/DL (ref 3.2–4.6)
ALBUMIN/GLOB SERPL: 0.9 {RATIO} (ref 1.2–3.5)
ALP SERPL-CCNC: 93 U/L (ref 50–136)
ALT SERPL-CCNC: 39 U/L (ref 12–65)
ANION GAP BLD CALC-SCNC: 5 MMOL/L (ref 7–16)
AST SERPL W P-5'-P-CCNC: 26 U/L (ref 15–37)
BASOPHILS # BLD AUTO: 0 K/UL (ref 0–0.2)
BASOPHILS # BLD: 0 % (ref 0–2)
BILIRUB SERPL-MCNC: 0.3 MG/DL (ref 0.2–1.1)
BUN SERPL-MCNC: 13 MG/DL (ref 8–23)
CALCIUM SERPL-MCNC: 8.7 MG/DL (ref 8.3–10.4)
CHLORIDE SERPL-SCNC: 104 MMOL/L (ref 98–107)
CO2 SERPL-SCNC: 33 MMOL/L (ref 21–32)
CREAT SERPL-MCNC: 0.88 MG/DL (ref 0.8–1.5)
CRP SERPL-MCNC: 0.5 MG/DL (ref 0–0.9)
DIFFERENTIAL METHOD BLD: ABNORMAL
EOSINOPHIL # BLD: 0.2 K/UL (ref 0–0.8)
EOSINOPHIL NFR BLD: 4 % (ref 0.5–7.8)
ERYTHROCYTE [DISTWIDTH] IN BLOOD BY AUTOMATED COUNT: 12.5 % (ref 11.9–14.6)
ERYTHROCYTE [SEDIMENTATION RATE] IN BLOOD: 14 MM/HR (ref 0–20)
GLOBULIN SER CALC-MCNC: 3.7 G/DL (ref 2.3–3.5)
GLUCOSE SERPL-MCNC: 181 MG/DL (ref 65–100)
HCT VFR BLD AUTO: 41.3 % (ref 41.1–50.3)
HGB BLD-MCNC: 14.8 G/DL (ref 13.6–17.2)
IMM GRANULOCYTES # BLD: 0 K/UL (ref 0–0.5)
IMM GRANULOCYTES NFR BLD AUTO: 0.2 % (ref 0–5)
LYMPHOCYTES # BLD AUTO: 40 % (ref 13–44)
LYMPHOCYTES # BLD: 1.9 K/UL (ref 0.5–4.6)
MCH RBC QN AUTO: 34.3 PG (ref 26.1–32.9)
MCHC RBC AUTO-ENTMCNC: 35.8 G/DL (ref 31.4–35)
MCV RBC AUTO: 95.8 FL (ref 79.6–97.8)
MONOCYTES # BLD: 0.3 K/UL (ref 0.1–1.3)
MONOCYTES NFR BLD AUTO: 6 % (ref 4–12)
NEUTS SEG # BLD: 2.3 K/UL (ref 1.7–8.2)
NEUTS SEG NFR BLD AUTO: 50 % (ref 43–78)
PLATELET # BLD AUTO: 199 K/UL (ref 150–450)
PMV BLD AUTO: 10.2 FL (ref 10.8–14.1)
POTASSIUM SERPL-SCNC: 4.3 MMOL/L (ref 3.5–5.1)
PROT SERPL-MCNC: 7.2 G/DL (ref 6.3–8.2)
RBC # BLD AUTO: 4.31 M/UL (ref 4.23–5.67)
SODIUM SERPL-SCNC: 142 MMOL/L (ref 136–145)
WBC # BLD AUTO: 4.7 K/UL (ref 4.3–11.1)

## 2017-07-20 PROCEDURE — 85652 RBC SED RATE AUTOMATED: CPT | Performed by: INTERNAL MEDICINE

## 2017-07-20 PROCEDURE — 85025 COMPLETE CBC W/AUTO DIFF WBC: CPT | Performed by: INTERNAL MEDICINE

## 2017-07-20 PROCEDURE — 36415 COLL VENOUS BLD VENIPUNCTURE: CPT | Performed by: INTERNAL MEDICINE

## 2017-07-20 PROCEDURE — 80053 COMPREHEN METABOLIC PANEL: CPT | Performed by: INTERNAL MEDICINE

## 2017-07-20 PROCEDURE — 86140 C-REACTIVE PROTEIN: CPT | Performed by: INTERNAL MEDICINE

## 2017-07-24 ENCOUNTER — HOSPITAL ENCOUNTER (OUTPATIENT)
Dept: SURGERY | Age: 72
Discharge: HOME | End: 2017-07-24

## 2017-07-24 VITALS
SYSTOLIC BLOOD PRESSURE: 161 MMHG | HEART RATE: 63 BPM | WEIGHT: 292 LBS | OXYGEN SATURATION: 91 % | DIASTOLIC BLOOD PRESSURE: 92 MMHG | HEIGHT: 72 IN | BODY MASS INDEX: 39.55 KG/M2 | TEMPERATURE: 97.8 F | RESPIRATION RATE: 18 BRPM

## 2017-07-24 RX ORDER — PREGABALIN 150 MG/1
150 CAPSULE ORAL 2 TIMES DAILY
COMMUNITY

## 2017-07-24 NOTE — PERIOP NOTES
Patient verified name, , and surgery as listed in [facility]. TYPE  CASE:lB  Orders per surgeon: were Received  Labs per surgeon:none:   Labs per anesthesia protocol:cbc and cmp results are in [facility] from 2017  EKG  :  EKG is not required per protocol      Patient provided with handouts including guide to surgery , transfusions, pain management and hand hygiene for the family and community. Pt verbalizes understanding of all pre-op instructions . Instructed that family must be present in building at all times. Nothing to eat or drink after midnight the night prior to surgery. Antibacterial soap and Hibiclens instructions given per hospital policy. Instructed patient to continue  previous medications as prescribed prior to surgery and  to take the following medications the day of surgery according to anesthesia guidelines : acetaminophen if needed, lyrica, propanolol and venlafaxine        Original medication prescription bottles were not visualized during patient appointment. Continue all previous medications unless otherwise directed. Instructed patient to hold  the following medications prior to surgery: all vitamins,supplements 7 days prior to surgery and all nsaids 5 days prior to surgery including motrin, advil and aleve      Patient verbalized understanding of all instructions and provided all medical/health information to the best of their ability.

## 2017-07-27 ENCOUNTER — ANESTHESIA (OUTPATIENT)
Dept: SURGERY | Age: 72
End: 2017-07-27
Payer: MEDICARE

## 2017-07-27 ENCOUNTER — ANESTHESIA EVENT (OUTPATIENT)
Dept: SURGERY | Age: 72
End: 2017-07-27
Payer: MEDICARE

## 2017-07-27 ENCOUNTER — HOSPITAL ENCOUNTER (OUTPATIENT)
Age: 72
Setting detail: OUTPATIENT SURGERY
Discharge: HOME | End: 2017-07-27
Attending: ORTHOPAEDIC SURGERY | Admitting: ORTHOPAEDIC SURGERY
Payer: MEDICARE

## 2017-07-27 ENCOUNTER — APPOINTMENT (OUTPATIENT)
Dept: GENERAL RADIOLOGY | Age: 72
End: 2017-07-27
Attending: ORTHOPAEDIC SURGERY
Payer: MEDICARE

## 2017-07-27 VITALS
OXYGEN SATURATION: 92 % | SYSTOLIC BLOOD PRESSURE: 139 MMHG | HEART RATE: 52 BPM | TEMPERATURE: 97.5 F | DIASTOLIC BLOOD PRESSURE: 69 MMHG | RESPIRATION RATE: 16 BRPM

## 2017-07-27 PROCEDURE — 74011250636 HC RX REV CODE- 250/636

## 2017-07-27 PROCEDURE — 76010000160 HC OR TIME 0.5 TO 1 HR INTENSV-TIER 1: Performed by: ORTHOPAEDIC SURGERY

## 2017-07-27 PROCEDURE — 77030018836 HC SOL IRR NACL ICUM -A: Performed by: ORTHOPAEDIC SURGERY

## 2017-07-27 PROCEDURE — 87075 CULTR BACTERIA EXCEPT BLOOD: CPT | Performed by: ORTHOPAEDIC SURGERY

## 2017-07-27 PROCEDURE — 77030003602 HC NDL NRV BLK BBMI -B: Performed by: ANESTHESIOLOGY

## 2017-07-27 PROCEDURE — 76210000063 HC OR PH I REC FIRST 0.5 HR: Performed by: ORTHOPAEDIC SURGERY

## 2017-07-27 PROCEDURE — 76942 ECHO GUIDE FOR BIOPSY: CPT | Performed by: ORTHOPAEDIC SURGERY

## 2017-07-27 PROCEDURE — 87205 SMEAR GRAM STAIN: CPT | Performed by: ORTHOPAEDIC SURGERY

## 2017-07-27 PROCEDURE — 77030020754 HC CUF TRNQT 2BLA STRY -B: Performed by: ORTHOPAEDIC SURGERY

## 2017-07-27 PROCEDURE — 77030011640 HC PAD GRND REM COVD -A: Performed by: ORTHOPAEDIC SURGERY

## 2017-07-27 PROCEDURE — 74011250636 HC RX REV CODE- 250/636: Performed by: ANESTHESIOLOGY

## 2017-07-27 PROCEDURE — 74011250636 HC RX REV CODE- 250/636: Performed by: ORTHOPAEDIC SURGERY

## 2017-07-27 PROCEDURE — 76210000021 HC REC RM PH II 0.5 TO 1 HR: Performed by: ORTHOPAEDIC SURGERY

## 2017-07-27 PROCEDURE — 76010010054 HC POST OP PAIN BLOCK: Performed by: ORTHOPAEDIC SURGERY

## 2017-07-27 PROCEDURE — 76060000032 HC ANESTHESIA 0.5 TO 1 HR: Performed by: ORTHOPAEDIC SURGERY

## 2017-07-27 PROCEDURE — 77030011884 HC TAPE CST PLSTR BSNM -A: Performed by: ORTHOPAEDIC SURGERY

## 2017-07-27 RX ORDER — MIDAZOLAM HYDROCHLORIDE 1 MG/ML
2 INJECTION, SOLUTION INTRAMUSCULAR; INTRAVENOUS
Status: DISCONTINUED | OUTPATIENT
Start: 2017-07-27 | End: 2017-07-27 | Stop reason: HOSPADM

## 2017-07-27 RX ORDER — SODIUM CHLORIDE, SODIUM LACTATE, POTASSIUM CHLORIDE, CALCIUM CHLORIDE 600; 310; 30; 20 MG/100ML; MG/100ML; MG/100ML; MG/100ML
75 INJECTION, SOLUTION INTRAVENOUS CONTINUOUS
Status: DISCONTINUED | OUTPATIENT
Start: 2017-07-27 | End: 2017-07-27 | Stop reason: HOSPADM

## 2017-07-27 RX ORDER — HYDROMORPHONE HYDROCHLORIDE 2 MG/ML
0.5 INJECTION, SOLUTION INTRAMUSCULAR; INTRAVENOUS; SUBCUTANEOUS
Status: DISCONTINUED | OUTPATIENT
Start: 2017-07-27 | End: 2017-07-27 | Stop reason: HOSPADM

## 2017-07-27 RX ORDER — ONDANSETRON 2 MG/ML
INJECTION INTRAMUSCULAR; INTRAVENOUS AS NEEDED
Status: DISCONTINUED | OUTPATIENT
Start: 2017-07-27 | End: 2017-07-27 | Stop reason: HOSPADM

## 2017-07-27 RX ORDER — NALOXONE HYDROCHLORIDE 0.4 MG/ML
0.2 INJECTION, SOLUTION INTRAMUSCULAR; INTRAVENOUS; SUBCUTANEOUS AS NEEDED
Status: DISCONTINUED | OUTPATIENT
Start: 2017-07-27 | End: 2017-07-27 | Stop reason: HOSPADM

## 2017-07-27 RX ORDER — FENTANYL CITRATE 50 UG/ML
100 INJECTION, SOLUTION INTRAMUSCULAR; INTRAVENOUS ONCE
Status: COMPLETED | OUTPATIENT
Start: 2017-07-27 | End: 2017-07-27

## 2017-07-27 RX ORDER — PROPOFOL 10 MG/ML
INJECTION, EMULSION INTRAVENOUS
Status: DISCONTINUED | OUTPATIENT
Start: 2017-07-27 | End: 2017-07-27 | Stop reason: HOSPADM

## 2017-07-27 RX ORDER — LIDOCAINE HYDROCHLORIDE 10 MG/ML
0.1 INJECTION INFILTRATION; PERINEURAL AS NEEDED
Status: DISCONTINUED | OUTPATIENT
Start: 2017-07-27 | End: 2017-07-27 | Stop reason: HOSPADM

## 2017-07-27 RX ORDER — MIDAZOLAM HYDROCHLORIDE 1 MG/ML
2 INJECTION, SOLUTION INTRAMUSCULAR; INTRAVENOUS ONCE
Status: COMPLETED | OUTPATIENT
Start: 2017-07-27 | End: 2017-07-27

## 2017-07-27 RX ORDER — OXYCODONE HYDROCHLORIDE 5 MG/1
5 TABLET ORAL
Status: DISCONTINUED | OUTPATIENT
Start: 2017-07-27 | End: 2017-07-27 | Stop reason: HOSPADM

## 2017-07-27 RX ADMIN — ONDANSETRON 4 MG: 2 INJECTION INTRAMUSCULAR; INTRAVENOUS at 10:48

## 2017-07-27 RX ADMIN — MIDAZOLAM HYDROCHLORIDE 1 MG: 1 INJECTION, SOLUTION INTRAMUSCULAR; INTRAVENOUS at 09:38

## 2017-07-27 RX ADMIN — PROPOFOL 50 MCG/KG/MIN: 10 INJECTION, EMULSION INTRAVENOUS at 10:40

## 2017-07-27 RX ADMIN — CEFAZOLIN 3 G: 1 INJECTION, POWDER, FOR SOLUTION INTRAMUSCULAR; INTRAVENOUS; PARENTERAL at 10:34

## 2017-07-27 RX ADMIN — SODIUM CHLORIDE, SODIUM LACTATE, POTASSIUM CHLORIDE, AND CALCIUM CHLORIDE 75 ML/HR: 600; 310; 30; 20 INJECTION, SOLUTION INTRAVENOUS at 09:30

## 2017-07-27 RX ADMIN — SODIUM CHLORIDE, SODIUM LACTATE, POTASSIUM CHLORIDE, AND CALCIUM CHLORIDE: 600; 310; 30; 20 INJECTION, SOLUTION INTRAVENOUS at 10:33

## 2017-07-27 RX ADMIN — FENTANYL CITRATE 50 MCG: 50 INJECTION, SOLUTION INTRAMUSCULAR; INTRAVENOUS at 09:38

## 2017-07-27 NOTE — OP NOTES
[address]   OPERATIVE REPORT       Name:  Dez Rincon   MR#:  [id number]   :  1945   Account #:  [de-identified]   Date of Adm:  2017       PREOPERATIVE DIAGNOSIS: Left foot hardware pain. POSTOPERATIVE DIAGNOSIS: Left foot hardware pain. NAME OF PROCEDURE: Left foot hardware removal.    SURGEON: Saundra Davis MD.    ANESTHESIA: Popliteal block with monitored anesthesia care. ESTIMATED BLOOD LOSS: Minimal.    TOURNIQUET TIME: 16 minutes at 250 mmHg. ANTIBIOTIC PROPHYLAXIS: Ancef given prior to procedure. INDICATIONS FOR PROCEDURE: The patient is a 77-year-old white   male with status post previous left triple arthrodesis pain over   the medial hardware and a small draining wound who presents for   operative fixation. Risks and benefits of procedure including,   but not limited to risk of anesthetic complications including   myocardial infarction, stroke, and death, as well as surgical   complications including damage to nerves and blood vessels, risk   of infection, incomplete pain relief, need for additional   surgery have been discussed at length with the patient. He   understands the risks and wishes to proceed with surgery at this   time. DETAILS OF PROCEDURE: The patient's operative site was marked   with indelible ink in the preoperative holding area. He was   brought to the operating room, placed supine. During a preop   surgical timeout, the left lower extremity was identified as the   surgical site, and prepped and draped in a standard sterile   fashion using ChloraPrep solution. The patient's previous   incision over the medial aspect was then reopened. There was no   evidence of infection or deep infection noted. Cultures were   obtained. The hardware was removed without difficulty. The   wounds were irrigated and closed using Monocryl and nylon   sutures. Sterile dressing was then applied. Anesthesia was   discontinued.  The patient was subsequently transferred to the   recovery bed and taken to the recovery room in satisfactory   condition. He appeared to tolerate the procedure well. There   were no apparent surgical or anesthetic complications. All   needle and sponge counts were correct.         MD Annie Finnegan / Asia Bates   D:  07/27/2017   13:22   T:  07/27/2017   13:46   Job #:  [id number]

## 2017-07-27 NOTE — ANESTHESIA POSTPROCEDURE EVALUATION
Post-Anesthesia Evaluation and Assessment    Patient: Judy Ramirez MRN: [id number]  SSN: [id number]    YOB: 1945  Age: 70 y.o. Sex: male       Cardiovascular Function/Vital Signs  Visit Vitals    /61    Pulse (!) 44    Temp 36.4 °C (97.5 °F)    Resp 15    SpO2 93%       Patient is status post total IV anesthesia anesthesia for Procedure(s):  LEFT FOOT HARDWARE REMOVAL . Nausea/Vomiting: None    Postoperative hydration reviewed and adequate. Pain:  Pain Scale 1: Numeric (0 - 10) (07/27/17 1113)  Pain Intensity 1: 0 (07/27/17 1113)   Managed    Neurological Status:   Neuro (WDL): Exceptions to WDL (07/27/17 1113)  Neuro  LLE Motor Response: Pharmacologically paralyzed (07/27/17 1113)   At baseline    Mental Status and Level of Consciousness: Arousable    Pulmonary Status:   Room air  Adequate oxygenation and airway patent    Complications related to anesthesia: None    Post-anesthesia assessment completed.  No concerns    Signed By: Vanda Ragland MD     July 27, 2017

## 2017-07-27 NOTE — ANESTHESIA PROCEDURE NOTES
Peripheral Block    Start time: 7/27/2017 9:38 AM  End time: 7/27/2017 9:41 AM  Performed by: Peterson Valle  Authorized by: Peterson Valle       Pre-procedure:    Indications: at surgeon's request and post-op pain management    Preanesthetic Checklist: patient identified, risks and benefits discussed, site marked, timeout performed, anesthesia consent given and patient being monitored    Timeout Time: 09:38          Block Type:   Block Type:  Popliteal  Laterality:  Left  Monitoring:  Standard ASA monitoring, responsive to questions, continuous pulse ox, oxygen, frequent vital sign checks and heart rate  Injection Technique:  Single shot  Procedures: ultrasound guided and nerve stimulator    Patient Position: supine  Prep: chlorhexidine    Location:  Lower thigh  Needle Type:  Stimuplex  Needle Gauge:  22 G  Needle Localization:  Nerve stimulator and ultrasound guidance  Motor Response: minimal motor response >0.4 mA    Medication Injected:  1.5%  mepivacaine  Adds:  Epi 1:200K  Volume (mL):  25    Assessment:  Number of attempts:  1  Injection Assessment:  Incremental injection every 5 mL, negative aspiration for CSF, ultrasound image on chart, no paresthesia, local visualized surrounding nerve on ultrasound, negative aspiration for blood and no intravascular symptoms  Patient tolerance:  Patient tolerated the procedure well with no immediate complications

## 2017-07-27 NOTE — ANESTHESIA PROCEDURE NOTES
Peripheral Block    Start time: 7/27/2017 9:42 AM  End time: 7/27/2017 9:42 AM  Performed by: Santosh Carcamo  Authorized by: Santosh Carcamo       Pre-procedure: Indications: at surgeon's request and post-op pain management    Preanesthetic Checklist: patient identified, risks and benefits discussed, site marked, timeout performed, anesthesia consent given and patient being monitored    Timeout Time: 09:42          Block Type:   Block Type:   Adductor canal  Laterality:  Left  Monitoring:  Standard ASA monitoring, responsive to questions, continuous pulse ox, oxygen, frequent vital sign checks and heart rate  Injection Technique:  Single shot  Procedures: ultrasound guided    Patient Position: supine  Prep: chlorhexidine    Location:  Mid thigh  Needle Type:  Stimuplex  Needle Gauge:  22 G  Needle Localization:  Ultrasound guidance  Medication Injected:  1.5%  mepivacaine  Volume (mL):  5    Assessment:  Number of attempts:  1  Injection Assessment:  Incremental injection every 5 mL, negative aspiration for CSF, ultrasound image on chart, no paresthesia, local visualized surrounding nerve on ultrasound, negative aspiration for blood and no intravascular symptoms  Patient tolerance:  Patient tolerated the procedure well with no immediate complications

## 2017-07-27 NOTE — BRIEF OP NOTE
BRIEF OPERATIVE NOTE    Date of Procedure: 7/27/2017   Preoperative Diagnosis: Other mechanical complication of internal orthopedic device ([facility]) [S84.645P]  Postoperative Diagnosis: mechanical complication of internal orthopedic device ([address] [O86.666U]    Procedure(s):  LEFT FOOT HARDWARE REMOVAL   Surgeon(s) and Role:     * Jethro Stephens MD - Primary         Assistant Staff:       Surgical Staff:  Circ-1: Hansel Mcduffie RN  Scrub Tech-1: Bettye Watkins  No case tracking events are documented in the log.   Anesthesia: General   Estimated Blood Loss: min  Specimens:   ID Type Source Tests Collected by Time Destination   1 : left foot wound Wound Surgical Specimen CULTURE, ANAEROBIC, CULTURE, WOUND W GRAM STAIN Isaac Crenshaw III, MD 7/27/2017 1110 Microbiology      Findings: no infection  Complications:no  Implants: * No implants in log *

## 2017-07-27 NOTE — IP AVS SNAPSHOT
[address] 
[phone] Patient: Garrison Guzman MRN: [id number] WUI:[id number]/40/5155 You are allergic to the following Allergen Reactions Adhesive Rash Bactrim (Sulfamethoxazole-Trimethoprim) Hives Recent Documentation Smoking Status Never Smoker Emergency Contacts Name Discharge Info Relation Home Work Mobile Maggy John DISCHARGE CAREGIVER [3] Spouse [3] [phone] About your hospitalization You were admitted on:  July 27, 2017 You last received care in the:  [facility] OP PACU You were discharged on:  July 27, 2017 Unit phone number:  [phone] Why you were hospitalized Your primary diagnosis was:  Not on File Providers Seen During Your Hospitalizations Provider Role Specialty Primary office phone Christen Us MD Attending Provider Orthopedic Surgery [phone] Your Primary Care Physician (PCP) Primary Care Physician Office Phone Office Fax Jodi Stacey [phone] Follow-up Information Follow up With Details Comments Contact Info Christen Us MD  Keep follow up appointment as scheduled [address] [employer] [facility] [address] [phone] Current Discharge Medication List  
  
ASK your doctor about these medications Dose & Instructions Dispensing Information Comments Morning Noon Evening Bedtime  
 acetaminophen 325 mg tablet Commonly known as:  TYLENOL Your last dose was: Your next dose is:    
   
   
 Dose:  325 mg Take 325 mg by mouth as needed for Pain. Refills:  0  
     
   
   
   
  
 aspirin 81 mg chewable tablet Your last dose was:     
   
Your next dose is:    
   
   
 Dose:  81 mg  
 Take 81 mg by mouth nightly. Indications: prevention of cerebrovascular accident Refills:  0  
     
   
   
   
  
 atorvastatin 40 mg tablet Commonly known as:  LIPITOR Your last dose was: Your next dose is:    
   
   
 Dose:  40 mg Take 40 mg by mouth nightly. Refills:  0  
     
   
   
   
  
 cholecalciferol (vitamin D3) 2,000 unit Tab Your last dose was: Your next dose is:    
   
   
 Dose:  2000 Units Take 2,000 Units by mouth every morning. Refills:  0  
     
   
   
   
  
 FISH  mg Cap Generic drug:  Omega-3 Fatty Acids Your last dose was: Your next dose is: Take  by mouth daily. Refills:  0  
     
   
   
   
  
 hydroCHLOROthiazide 25 mg tablet Commonly known as:  HYDRODIURIL Your last dose was: Your next dose is:    
   
   
 Dose:  25 mg Take 25 mg by mouth every morning. Refills:  0  
     
   
   
   
  
 losartan 100 mg tablet Commonly known as:  COZAAR Your last dose was: Your next dose is:    
   
   
 Dose:  100 mg Take 100 mg by mouth every morning. Refills:  0 LYRICA 150 mg capsule Generic drug:  pregabalin Your last dose was: Your next dose is:    
   
   
 Dose:  150 mg Take 150 mg by mouth two (2) times a day. Refills:  0 MULTI-VITAMIN PO Your last dose was: Your next dose is:    
   
   
 Dose:  1 Tab Take 1 Tab by mouth daily. Refills:  0 PHENobarbital 97.2 mg tablet Commonly known as:  LUMINAL Your last dose was: Your next dose is:    
   
   
 Dose:  97.2 mg Take 1 Tab by mouth nightly. Max Daily Amount: 97.2 mg.  
 Quantity:  30 Tab Refills:  0  
     
   
   
   
  
 propranolol  mg capsule Commonly known as:  INDERAL LA Your last dose was:     
   
Your next dose is:    
   
   
 Dose:  120 mg  
 Take 120 mg by mouth every morning. Indications: HYPERTENSION Refills:  0  
     
   
   
   
  
 venlafaxine-SR 75 mg capsule Commonly known as:  EFFEXOR-XR Your last dose was: Your next dose is:    
   
   
 Dose:  75 mg Take 75 mg by mouth every morning. Indications: MAJOR DEPRESSIVE DISORDER Refills:  0  
     
   
   
   
  
 VITAMIN B-12 1,000 mcg sublingual tablet Generic drug:  cyanocobalamin Your last dose was: Your next dose is:    
   
   
 Dose:  1000 mcg Take 1,000 mcg by mouth daily. Refills:  0 Discharge Instructions INSTRUCTIONS FOLLOWING FOOT SURGERY 
 
ACTIVITY Elevate foot. No Ice Protected partial weight bearing on the heel only as tolerated in post op shoe after full sensation returns. Let the office know if dressing gets saturated with water. DIET Day of Surgery: Clear liquids until no nausea or vomiting; then light, bland diet (Baked chicken, plain rice, grits, scrambled egg, toast). Nothing Greasy, fried or spicy today Advance to regular diet on second day, unless your doctor orders otherwise. PAIN Take pain medications as directed by your doctor. Call your doctor if pain is NOT relieved by medication. PAIN MED SIDE EFFECTS Constipation: Lots of fluids, try prune juice, then OTC stool softeners if necessary Nausea: Take medication with food. DRESSING CARE Keep dry and in place until follow up appointment CALL YOUR DOCTOR IF YOU HAVE Excessive bleeding that does not stop after holding mild pressure over the area. Temperature of 101 degrees or above. Redness, excessive swelling or bruising, and/or green or yellow, smelly discharge from incision. Loss of sensation - cold, white or blue toes. AFTER ANESTHESIA For the first 24 hours and while taking narcotics for pain: DO NOT Drive, Drink Alcoholic beverages, or make important Decisions. Be aware of dizziness following anesthesia and while taking pain medication. Preventing Infection at Home We care about preventing infection and avoiding the spread of germs  not only when you are in the hospital but also when you return home. When you return home from the hospital, its important to take the following steps to help prevent infection and avoid spreading germs that could infect you and others. Ask everyone in your home to follow these guidelines, too. Clean Your Hands · Clean your hands whenever your hands are visibly dirty, before you eat, before or after touching your mouth, nose or eyes, and before preparing food. Clean them after contact with body fluids, using the restroom, touching animals or changing diapers. · When washing hands, wet them with warm water and work up a lather. Rub hands for at least 15 seconds, then rinse them and pat them dry with a clean towel or paper towel. · When using hand sanitizers, it should take about 15 seconds to rub your hands dry. If not, you probably didnt apply enough . Cover Your Sneeze or Cough Germs are released into the air whenever you sneeze or cough. To prevent the spread of infection: · Turn away from other people before coughing or sneezing. · Cover your mouth or nose with a tissue when you cough or sneeze. Put the tissue in the trash. · If you dont have a tissue, cough or sneeze into your upper sleeve, not your hands. · Always clean your hands after coughing or sneezing. Care for Wounds Your skin is your bodys first line of defense against germs, but an open wound leaves an easy way for germs to enter your body. To prevent infection: · Clean your hands before and after changing wound dressings, and wear gloves to change dressings if recommended by your doctor. · Take special care with IV lines or other devices inserted into the body.  If you must touch them, clean your hands first. 
 · Follow any specific instructions from your doctor to care for your wounds. Contact your doctor if you experience any signs of infection, such as fever or increased redness at the surgical or wound site. Keep a [address] · Clean or wipe commonly touched hard surfaces like door handles, sinks, tabletops, phones and TV remotes. · Use products labeled disinfectant to kill harmful bacteria and viruses. · Use a clean cloth or paper towel to clean and dry surfaces. Wiping surfaces with a dirty dishcloth, sponge or towel will only spread germs. · Never share toothbrushes, zamora, drinking glasses, utensils, razor blades, face cloths or bath towels to avoid spreading germs. · Be sure that the linens that you sleep on are clean. · Keep pets away from wounds and wash your hands after touching pets, their toys or bedding. We care about you and your health. Remember, preventing infections is a team effort between you, your family, friends and health care providers. APPOINTMENT DATE/TIME Keep as scheduled YOUR DOCTOR'S PHONE NUMBER [phone] DISCHARGE SUMMARY from Nurse PATIENT INSTRUCTIONS: 
 
After general anesthesia or intravenous sedation, for 24 hours or while taking prescription Narcotics: · Limit your activities · Do not drive and operate hazardous machinery · Do not make important personal or business decisions · Do  not drink alcoholic beverages · If you have not urinated within 8 hours after discharge, please contact your surgeon on call. *  Please give a list of your current medications to your Primary Care Provider. *  Please update this list whenever your medications are discontinued, doses are 
    changed, or new medications (including over-the-counter products) are added. *  Please carry medication information at all times in case of emergency situations. These are general instructions for a healthy lifestyle: No smoking/ No tobacco products/ Avoid exposure to second hand smoke Surgeon General's Warning:  Quitting smoking now greatly reduces serious risk to your health. Obesity, smoking, and sedentary lifestyle greatly increases your risk for illness A healthy diet, regular physical exercise & weight monitoring are important for maintaining a healthy lifestyle You may be retaining fluid if you have a history of heart failure or if you experience any of the following symptoms:  Weight gain of 3 pounds or more overnight or 5 pounds in a week, increased swelling in our hands or feet or shortness of breath while lying flat in bed. Please call your doctor as soon as you notice any of these symptoms; do not wait until your next office visit. Recognize signs and symptoms of STROKE: 
 
F-face looks uneven A-arms unable to move or move unevenly S-speech slurred or non-existent T-time-call 911 as soon as signs and symptoms begin-DO NOT go Back to bed or wait to see if you get better-TIME IS BRAIN. Discharge Orders None Introducing [facility]! Marilyn Salas introduces [employer] patient portal. Now you can access parts of your medical record, email your doctor's office, and request medication refills online. 1. In your internet browser, go to [email or web address] 2. Click on the First Time User? Click Here link in the Sign In box. You will see the New Member Sign Up page. 3. Enter your [employer] Access Code exactly as it appears below. You will not need to use this code after youve completed the sign-up process. If you do not sign up before the expiration date, you must request a new code. · [employer] Access Code: [email or web address] Expires: 9/20/2017  2:20 PM 
 
4. Enter the last four digits of your Social Security Number (xxxx) and Date of Birth (mm/dd/yyyy) as indicated and click Submit. You will be taken to the next sign-up page. 5. Create a Vessel ID. This will be your Vessel login ID and cannot be changed, so think of one that is secure and easy to remember. 6. Create a Vessel password. You can change your password at any time. 7. Enter your Password Reset Question and Answer. This can be used at a later time if you forget your password. 8. Enter your e-mail address. You will receive e-mail notification when new information is available in [address] 9. Click Sign Up. You can now view and download portions of your medical record. 10. Click the Download Summary menu link to download a portable copy of your medical information. If you have questions, please visit the Frequently Asked Questions section of the Vessel website. Remember, Vessel is NOT to be used for urgent needs. For medical emergencies, dial 911. Now available from your iPhone and Android! General Information Please provide this summary of care documentation to your next provider. Patient Signature:  ____________________________________________________________ Date:  ____________________________________________________________  
  
Kristian Endo Provider Signature:  ____________________________________________________________ Date:  ____________________________________________________________

## 2017-07-27 NOTE — ANESTHESIA PREPROCEDURE EVALUATION
Anesthetic History               Review of Systems / Medical History  Patient summary reviewed and pertinent labs reviewed    Pulmonary        Sleep apnea: CPAP           Neuro/Psych     seizures (last one 20 years ago): well controlled         Cardiovascular    Hypertension: well controlled              Exercise tolerance: <4 METS  Comments:  Thromboembolus  Denies CP or changes in functional status   GI/Hepatic/Renal         Renal disease (bladder bleeding last year)       Endo/Other        Morbid obesity and arthritis     Other Findings              Physical Exam    Airway  Mallampati: III  TM Distance: 4 - 6 cm  Neck ROM: normal range of motion   Mouth opening: Normal     Cardiovascular  Regular rate and rhythm,  S1 and S2 normal,  no murmur, click, rub, or gallop  Rhythm: regular  Rate: normal         Dental    Dentition: Poor dentition     Pulmonary  Breath sounds clear to auscultation               Abdominal  GI exam deferred       Other Findings            Anesthetic Plan    ASA: 3  Anesthesia type: total IV anesthesia      Post-op pain plan if not by surgeon: peripheral nerve block single    Induction: Intravenous  Anesthetic plan and risks discussed with: Patient

## 2017-07-27 NOTE — DISCHARGE INSTRUCTIONS
INSTRUCTIONS FOLLOWING FOOT SURGERY    ACTIVITY  Elevate foot. No Ice  Protected partial weight bearing on the heel only as tolerated in post op shoe after full sensation returns. Let the office know if dressing gets saturated with water. DIET  Day of Surgery: Clear liquids until no nausea or vomiting; then light, bland diet (Baked chicken, plain rice, grits, scrambled egg, toast). Nothing Greasy, fried or spicy today  Advance to regular diet on second day, unless your doctor orders otherwise. PAIN  Take pain medications as directed by your doctor. Call your doctor if pain is NOT relieved by medication. PAIN MED SIDE EFFECTS  Constipation: Lots of fluids, try prune juice, then OTC stool softeners if necessary  Nausea: Take medication with food. DRESSING CARE Keep dry and in place until follow up appointment    CALL YOUR DOCTOR IF YOU HAVE  Excessive bleeding that does not stop after holding mild pressure over the area. Temperature of 101 degrees or above. Redness, excessive swelling or bruising, and/or green or yellow, smelly discharge from incision. Loss of sensation - cold, white or blue toes. AFTER ANESTHESIA  For the first 24 hours and while taking narcotics for pain: DO NOT Drive, Drink Alcoholic beverages, or make important Decisions. Be aware of dizziness following anesthesia and while taking pain medication. Preventing Infection at Home  We care about preventing infection and avoiding the spread of germs - not only when you are in the hospital but also when you return home. When you return home from the hospital, its important to take the following steps to help prevent infection and avoid spreading germs that could infect you and others. Ask everyone in your home to follow these guidelines, too. Clean Your Hands  · Clean your hands whenever your hands are visibly dirty, before you eat, before or after touching your mouth, nose or eyes, and before preparing food.  Clean them after contact with body fluids, using the restroom, touching animals or changing diapers. · When washing hands, wet them with warm water and work up a lather. Rub hands for at least 15 seconds, then rinse them and pat them dry with a clean towel or paper towel. · When using hand sanitizers, it should take about 15 seconds to rub your hands dry. If not, you probably didnt apply enough . Cover Your Sneeze or Cough  Germs are released into the air whenever you sneeze or cough. To prevent the spread of infection:  · Turn away from other people before coughing or sneezing. · Cover your mouth or nose with a tissue when you cough or sneeze. Put the tissue in the trash. · If you dont have a tissue, cough or sneeze into your upper sleeve, not your hands. · Always clean your hands after coughing or sneezing. Care for Wounds  Your skin is your bodys first line of defense against germs, but an open wound leaves an easy way for germs to enter your body. To prevent infection:  · Clean your hands before and after changing wound dressings, and wear gloves to change dressings if recommended by your doctor. · Take special care with IV lines or other devices inserted into the body. If you must touch them, clean your hands first.  · Follow any specific instructions from your doctor to care for your wounds. Contact your doctor if you experience any signs of infection, such as fever or increased redness at the surgical or wound site. Keep a Clean Home  · Clean or wipe commonly touched hard surfaces like door handles, sinks, tabletops, phones and TV remotes. · Use products labeled disinfectant to kill harmful bacteria and viruses. · Use a clean cloth or paper towel to clean and dry surfaces. Wiping surfaces with a dirty dishcloth, sponge or towel will only spread germs. · Never share toothbrushes, zamora, drinking glasses, utensils, razor blades, face cloths or bath towels to avoid spreading germs.   · Be sure that the linens that you sleep on are clean. · Keep pets away from wounds and wash your hands after touching pets, their toys or bedding. We care about you and your health. Remember, preventing infections is a team effort between you, your family, friends and health care providers. APPOINTMENT DATE/TIME Keep as scheduled    YOUR DOCTOR'S PHONE NUMBER [phone]      DISCHARGE SUMMARY from Nurse    PATIENT INSTRUCTIONS:    After general anesthesia or intravenous sedation, for 24 hours or while taking prescription Narcotics:  · Limit your activities  · Do not drive and operate hazardous machinery  · Do not make important personal or business decisions  · Do  not drink alcoholic beverages  · If you have not urinated within 8 hours after discharge, please contact your surgeon on call. *  Please give a list of your current medications to your Primary Care Provider. *  Please update this list whenever your medications are discontinued, doses are      changed, or new medications (including over-the-counter products) are added. *  Please carry medication information at all times in case of emergency situations. These are general instructions for a healthy lifestyle:    No smoking/ No tobacco products/ Avoid exposure to second hand smoke    Surgeon General's Warning:  Quitting smoking now greatly reduces serious risk to your health. Obesity, smoking, and sedentary lifestyle greatly increases your risk for illness    A healthy diet, regular physical exercise & weight monitoring are important for maintaining a healthy lifestyle    You may be retaining fluid if you have a history of heart failure or if you experience any of the following symptoms:  Weight gain of 3 pounds or more overnight or 5 pounds in a week, increased swelling in our hands or feet or shortness of breath while lying flat in bed.   Please call your doctor as soon as you notice any of these symptoms; do not wait until your next office visit. Recognize signs and symptoms of STROKE:    F-face looks uneven    A-arms unable to move or move unevenly    S-speech slurred or non-existent    T-time-call 911 as soon as signs and symptoms begin-DO NOT go       Back to bed or wait to see if you get better-TIME IS BRAIN.

## 2017-07-27 NOTE — PERIOP NOTES
Dr Vince Preciado was called to clarify weight bearing instructions. Discharge instructions given to spouse. Verbalized understanding and opportunity for questions was given. Dr Marie De Leon okay to discharge at this time. Pt and belongings taken via wheelchair to car.

## 2017-07-28 NOTE — DISCHARGE INSTRUCTIONS
Wound Care: After Your Visit  Your Care Instructions  Taking good care of your wound at home will help it heal quickly and reduce your chance of infection. The doctor has checked you carefully, but problems can develop later. If you notice any problems or new symptoms, get medical treatment right away. Follow-up care is a key part of your treatment and safety. Be sure to make and go to all appointments, and call your doctor if you are having problems. It's also a good idea to know your test results and keep a list of the medicines you take. How can you care for yourself at home? · Clean the area with soap and water 2 times a day unless your doctor gives you different instructions. Don't use hydrogen peroxide or alcohol, which can slow healing. ¨ You may cover the wound with a thin layer of antibiotic ointment, such as bacitracin, and a nonstick bandage. ¨ Apply more ointment and replace the bandage as needed. · Take pain medicines exactly as directed. Some pain is normal with a wound, but do not ignore pain that is getting worse instead of better. You could have an infection. ¨ If the doctor gave you a prescription medicine for pain, take it as prescribed. ¨ If you are not taking a prescription pain medicine, ask your doctor if you can take an over-the-counter medicine. · Your doctor may have closed your wound with stitches (sutures), staples, or skin glue. ¨ If you have stitches, your doctor may remove them after several days to 2 weeks. Or you may have stitches that dissolve on their own. ¨ If you have staples, your doctor may remove them after 7 to 10 days. ¨ If your wound was closed with skin glue, the glue will wear off in a few days to 2 weeks. When should you call for help? Call your doctor now or seek immediate medical care if:  · You have signs of infection, such as:  ¨ Increased pain, swelling, warmth, or redness near the wound. ¨ Red streaks leading from the wound.   ¨ Pus draining from the wound. ¨ A fever. · You bleed so much from your incision that you soak one or more bandages over 2 to 4 hours. Watch closely for changes in your health, and be sure to contact your doctor if:  · The wound is not getting better each day. Where can you learn more? Go to [employer]  Enter M973 in the search box to learn more about \"Wound Care: After Your Visit. \"   © 3836-9604 [employer], [employer]. Care instructions adapted under license by [facility] (which disclaims liability or warranty for this information). This care instruction is for use with your licensed healthcare professional. If you have questions about a medical condition or this instruction, always ask your healthcare professional. [place] [address] any warranty or liability for your use of this information. Content Version: [id number];  Last Revised: April 23, 2012 - sending AM vitamin D  - she has life alert  - physical therapy  - need to address polypharmacy, big problem with her.   - I'm holding her benzos at night while she's in house and not eating. - sending AM vitamin D  - she has life alert  - physical therapy  - social work to discuss increase in home services, and [facility] for med box.   - need to address polypharmacy, big problem with her.   - I'm holding her benzos at night while she's in house and not eating.

## 2017-07-29 LAB
BACTERIA SPEC CULT: NORMAL
GRAM STN SPEC: NORMAL
GRAM STN SPEC: NORMAL
SERVICE CMNT-IMP: NORMAL

## 2017-07-31 LAB
BACTERIA SPEC ANAEROBE CULT: NORMAL
SPECIMEN SOURCE: NORMAL

## 2017-08-01 LAB
BACTERIA SPEC CULT: NORMAL
SOURCE, RSRC56: NORMAL

## 2017-08-08 NOTE — PROGRESS NOTES
"[facility]  Pediatric Neurology Consult    Patient Name:  Floyd Vasquez  MRN:  [id number]    :  2016  Date of Admission:  (Not on file)  Date of Service:  2017  Primary care provider:  Lauren George          HPI:   The pt had a seizure on the first day of life. 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 The pt was weaned off of phenobarbital and has been stable since.    Interval hx:  The pt was last seen by Dr Gutierrez on 2016, and the family reported no sz, or seizure like events since d/c from the hospital, the baby is now 10 months and since the last time he was seen, the family reported no obvious seizures, starring, unexplained injury.   He was seen in the ED on 2016 for \"febrile seizure\" but the episode as described is not very clear if that was true seizure. In all cases, the baby does not take any medication for seizure since he was discharged from the hospital. There was minor concern last time about his motor development, but today the mother said that he is catching up on that with PT, he is now sitting, rolling, holding his head and neck in all positions, recently he was able to stand up in his feet, but he is not able to walk yet.   He is vocalizing, sleeping well, his behavior is of no concerns, his appetite is good.   Objective:   BP 96/58 mmHg  Pulse 121  Ht 2' 5.45\" (74.8 cm)  Wt 18 lb 6.5 oz (8.35 kg)  BMI 14.92 kg/m2  HC 46.8 cm (18.43\")    Neuro Exam:   Yifan is sitting in his mother lap, playing with a car and putting it in his mouth, he was attentive and to the examiner entering the room, he tracks with full eye movements, he was able to reach for objects with both hands, his tone is normal in the upper and lower ext, his pattellar reflexes are presents and normal, no clonus, his toes are down. " Jamila Augustin  : 1945 [facility] [address]  [phone]   [phone]       OUTPATIENT PHYSICAL THERAPY: Discontinue 2017    ICD-10: Treatment Diagnosis: Cervicalgia (M54.2)   RADICULOPATHY, CERVICAL REGION M54.12       Precautions/Allergies:   Adhesive and Bactrim [sulfamethoxazole-trimethoprim]   Fall Risk Score: 2 (? 5 = High Risk)  MD Orders: Eval and Treat: TRACTION MEDICAL/REFERRING DIAGNOSIS:  Radiculopathy, cervical region [M54.12]  Other cervical disc degeneration, mid-cervical region, unspecified level [M50.320]   DATE OF ONSET: 17  REFERRING PHYSICIAN: Darlyn Vogel PA  RETURN PHYSICIAN APPOINTMENT: TBD by patient        DISCONTINUATION:  Mr. Meghan Kelley has attended 11 physical therapy sessions including initial evaluation. Sessions have included cervical traction via traction machine (previous manual traction attempts unsuccessful). He has no pain, just continued tingling to L shoulder area. Goal met with regards to pain as well as Neck Disability Index (how pain affecs ability to manage everyday-life activities). Patient pleased with progress. Will discontinue at this time. INITIAL ASSESSMENT:  Mr. Jeremias Mcginnis has attended 8 physical therapy session including initial evaluation. Sessions have included cervical traction via traction machine (previous manual traction attempts unsuccessful). He has no pain, just continued tingling to L shoulder area. Goal met with regards to pain as well as Neck Disability Index (how pain affecs ability to manage everyday-life activities). 2 more visits scheduled after today--progressing towards DC. Jeremias Mcginnis presents with S/S of increased pain, decreased ROM, decreased strength, decreased functional tolerance consistent with S/S of cervical radiculopathy.  This is a chronic condition for Jeremias Mcginnis   of which had little response       Labs results and imaging:   Reviewed in Epic.     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 He takes no medication at this time.   The pt is developing quite normally, he has been seizure free since the event on his first day of life.     - no further intervention from neurology stand point  - mother wants appointment with Hematology, we are no opposed to that.   - continue PT  - RTC : PRN    Pt was seen and discussed with Dr Matt Nelson MD  Neurology resident  [id number] 1945    Attending Addendum: I reviewed the history and examined Floyd. He should continue PT and we will see him back as needed.    Dave Gutierrez M.D.                 from PT previously. Will begin mechanical traction today. Shot did improve symptoms per Ernie  . Ernie will benefit from mechanical traction, home exercise program, therapeutic and postural strengthening exercises, manual therapeutic techniques (ie. Distraction, SOR, myofascial release/soft tissue mobilization) as appropriate to address Jamal John's current condition. Ernie will benefit from skilled PT (medically necessary) to address above deficits affecting participation in basic ADLs and overall functional tolerance. PROBLEM LIST (Impacting functional limitations):  1. Decreased Strength  2. Decreased ADL/Functional Activities  3. Decreased Transfer Abilities  4. Decreased Ambulation Ability/Technique  5. Decreased Balance  6. Increased Pain  7. Decreased Activity Tolerance  8. Increased Fatigue  9. Increased Shortness of Breath  10. Decreased Orcas with Home Exercise Program INTERVENTIONS PLANNED:  1. Balance Exercise  2. Bed Mobility  3. Cold  4. Cryotherapy  5. Family Education  6. Gait Training  7. Heat  8. Home Exercise Program (HEP)  9. Manual Therapy  10. Neuromuscular Re-education/Strengthening  11. Range of Motion (ROM)  12. Therapeutic Activites  13. Therapeutic Exercise/Strengthening  14. Transfer Training   TREATMENT PLAN:  Effective Dates: 5/5/17 TO 8/5/17. Frequency/Duration: 2 times a week for 12 weeks   GOALS: (Goals have been discussed and agreed upon with patient.)  Assessed 5.30.17  SHORT-TERM FUNCTIONAL GOALS: Time Frame: 4 weeks  1. Ernie will report <=2/10 pain to cervical spine with performance of functional spinal mobility and rotation. Goal Met 5.30.17  2. Ernie will demonstrate improved NDI score, indicating spinal improvement from 14/50 to 12/50 affecting minimal to no difficulty with performance of cervical mobility and strengthening. Goal Met 5.30.17  3.   Ernie to be independent with initial HEP for cervical region and UE's. Goal Met 5.30.17  4. [address] Herron will improve ROM to >=90% to assist with improved function during instrumental activities of daily living. Oleksandr Yusuf will improve MMT to >=4+/5 to all UE strengthening to return to PLOF and improve functional tolerance. DISCHARGE GOALS: Time Frame: 8 weeks Assessed 5.30.17  1. [address] Annika Herron will report <=1/10 pain to cervical spine with performance of functional spinal mobility and rotation and minimal to no difficulty with such tasks. Goal Met 5.30.17  2. [address] Annika Herron will demonstrate improved NDI score, indicating spinal improvement from 14/50 to 10/50 affecting minimal to no difficulty with performance of cervical mobility and strengthening. [address] Esther John to be independent with advanced HEP for cervical region and UE's. Goal Met 5.30.17    Rehabilitation Potential For Stated Goals: Good  Regarding Cristina John's therapy, I certify that the treatment plan above will be carried out by a therapist or under their direction. Thank you for this referral,  Mark Oleary PTA     Referring Physician Signature: MANASA Luo             HISTORY:   History of Present Injury/Illness (Reason for Referral): At times his L shoulder to upper neck gets tingling. \"It's harder to get relief with even heat or rubs or medication. The pain is every day. The pain is ONLY ON THE LEFT. It travels up his head and heck but does not affect R side. Wife states \"He really got okay with neck after shots. He had L foot surgery and after being home from surgery he developed L arm/neck pain. \"  \"the MD thinks hopefully the therapy will help. The next step is an MRI and then shots. \"  Past Medical History/Comorbidities:   Mr. Lani Azevedo  has a past medical history of Anxiety; Cancer ([facility] [address] (2010); Chronic pain; Hyperlipidemia; Hypertension; Morbid obesity ([facility] [address]  Osteoarthritis; Personal history of prostate cancer; Seizures ([facility] [address] (first one 30's); Sleep apnea; and Thromboembolus ([facility] [address] (~2009). He also has no past medical history of Difficult intubation; Malignant hyperthermia due to anesthesia; Nausea & vomiting; Pseudocholinesterase deficiency; or Stroke [facility]). Mr. Julian Meyer  has a past surgical history that includes bladder suspension; prostatectomy (12/2010); other surgical; knee arthroscopy; knee replacement; orthopaedic; and cataract removal (Bilateral). Social History/Living Environment:       Social History     Social History    Marital status:      Spouse name: N/A    Number of children: N/A    Years of education: N/A     Occupational History    Not on file. Social History Main Topics    Smoking status: Never Smoker    Smokeless tobacco: Never Used    Alcohol use No    Drug use: No    Sexual activity: Not on file     Other Topics Concern    Not on file     Social History Narrative     Prior Level of Function/Work/Activity:  Independent but with pain---VERY SEDENTARY. Personal Factors:          Sex:  male        Age:  70 y.o. Current Medications:    Current Outpatient Prescriptions:     pregabalin (LYRICA) 150 mg capsule, Take 150 mg by mouth two (2) times a day., Disp: , Rfl:     Omega-3 Fatty Acids (FISH OIL) 500 mg cap, Take  by mouth daily. , Disp: , Rfl:     PHENobarbital (LUMINAL) 97.2 mg tablet, Take 1 Tab by mouth nightly. Max Daily Amount: 97.2 mg., Disp: 30 Tab, Rfl: 0    acetaminophen (TYLENOL) 325 mg tablet, Take 325 mg by mouth as needed for Pain., Disp: , Rfl:     atorvastatin (LIPITOR) 40 mg tablet, Take 40 mg by mouth nightly., Disp: , Rfl:     cholecalciferol, vitamin D3, 2,000 unit tab, Take 2,000 Units by mouth every morning., Disp: , Rfl:     hydrochlorothiazide (HYDRODIURIL) 25 mg tablet, Take 25 mg by mouth every morning., Disp: , Rfl:     cyanocobalamin (VITAMIN B-12) 1,000 mcg sublingual tablet, Take 1,000 mcg by mouth daily. , Disp: , Rfl:     losartan (COZAAR) 100 mg tablet, Take 100 mg by mouth every morning., Disp: , Rfl:     propranolol LA (INDERAL LA) 160 mg capsule, Take 120 mg by mouth every morning. Indications: HYPERTENSION, Disp: , Rfl:     venlafaxine-SR (EFFEXOR-XR) 75 mg capsule, Take 75 mg by mouth every morning. Indications: MAJOR DEPRESSIVE DISORDER, Disp: , Rfl:     aspirin 81 mg chewable tablet, Take 81 mg by mouth nightly. Indications: prevention of cerebrovascular accident, Disp: , Rfl:     MULTI-VITAMIN PO, Take 1 Tab by mouth daily. , Disp: , Rfl:      Date Last Reviewed:  8/8/2017   Number of Personal Factors/Comorbidities that affect the Plan of Care: 1-2: MODERATE COMPLEXITY   EXAMINATION:   Observation/Orthostatic Postural Assessment: Forward head and shoulders rounded. Palpation:          Minimal to no tenderness L upper trap and posterior cervical mm. Hypomobility presents C/S.  ROM:    Joint: Eval Date:  Re-Assess Date: 5/30/17 Re-Assess Date:         Cervical AROM      Flexion (% of normal): 100% with no pain; no pain with % with no pain; no pain with OP    Extension (% of normal): 100% with no pain; no pain with % with no pain; no pain with OP    L sidebending (% of normal): 80% with discomfort to L side of neck; no pain with OP 85% no pain    R sidebending (% of normal): 100% no pain; no pain with % with no pain; no pain with OP    L rotation (% of normal): 70% with hard end feel---restricted; minimal to no discomfort; no pain with OP 85% no pain    R rotation (% of normal): 100% with no pain; no pain with OP.   100% with no pain; no pain with OP    Shoulder ROM All WFL (flexion, abduction, IR, ER) no pain. WFL, no pain          Pain at end-range or with AROM? Yes/No See above. See above    Any pain with overpressure?  Yes/No        Strength:    Joint: Eval Date:  Re-Assess Date:  Re-Assess Date:     RIGHT LEFT RIGHT LEFT RIGHT LEFT   Shoulder flexion: = 5/5 5/5 5/5 5/5 Shoulder extension: 5/5 5/5 5/5 5/5     Shoulder abduction: 5/5 5/5 5/5 5/5     Shoulder IR: 5/5 5/5 5/5 5/5     Shoulder ER: 5/5 5/5 5/5 5/5     Elbow flexion: 5/5 5/5 5/5 5/5     Elbow extension: 5/5 5/5 5/5 5/5     Wrist flexion/extension: 5/5 5/5 5/5 5/5       Hx of ankle surgery in January 2017. Has been out of the boot since last week. Special Tests: Assessed @ Initial Visit ; 5/30/17   Spurling's Test: Positive/Negative----Negative. Pain/numbness down to L shoulder--does not pass elbow. Neurological Screen: Radiating symptoms? Yes/No---Yes between L shoulder and cervical region---does pass up above occipital.  Reports headaches occasionally but not everyday. Functional Mobility:  Assessed @ Initial Visit ---very sedentary individual.  Balance:  Assessed @ Initial Visit      Body Structures Involved:  1. Nerves  2. Bones  3. Joints  4. Muscles  5. Ligaments Body Functions Affected:  1. Sensory/Pain  2. Neuromusculoskeletal  3. Movement Related Activities and Participation Affected:  1. Mobility  2. Self Care   Number of elements that affect the Plan of Care: 4+: HIGH COMPLEXITY   CLINICAL PRESENTATION:   Presentation: Evolving clinical presentation with changing clinical characteristics: MODERATE COMPLEXITY   CLINICAL DECISION MAKING:   Outcome Measure: Tool Used: Neck Disability Index (NDI)  Score:  Initial: 14/50  Most Recent: 12/50 (Date: 5/30/17 )   Interpretation of Score: The Neck Disability Index is a revised form of the Oswestry Low Back Pain Index and is designed to measure the activities of daily living in person's with neck pain. Each section is scored on a 0-5 scale, 5 representing the greatest disability. The scores of each section are added together for a total score of 50. Score 0 1-10 11-20 21-30 31-40 41-49 50   Modifier CH CI CJ CK CL CM CN     ?  Mobility - Walking and Moving Around:     - CURRENT STATUS: CJ - 20%-39% impaired, limited or restricted    - GOAL STATUS: CI - 1%-19% impaired, limited or restricted    - D/C STATUS:  ---------------To be determined---------------    Medical Necessity:   · Skilled intervention continues to be required due to address above deficits affecting participation in basic ADLs and overall functional tolerance. Reason for Services/Other Comments:  · Patient continues to require skilled intervention due to address above deficits affecting participation in basic ADLs and overall functional tolerance.    Use of outcome tool(s) and clinical judgement create a POC that gives a: Clear prediction of patient's progress: LOW COMPLEXITY                  [address] [facility]

## 2018-07-25 ENCOUNTER — APPOINTMENT (OUTPATIENT)
Dept: ULTRASOUND IMAGING | Age: 73
End: 2018-07-25
Attending: EMERGENCY MEDICINE
Payer: MEDICARE

## 2018-07-25 ENCOUNTER — HOSPITAL ENCOUNTER (EMERGENCY)
Age: 73
Discharge: HOME | End: 2018-07-25
Attending: EMERGENCY MEDICINE
Payer: MEDICARE

## 2018-07-25 VITALS
WEIGHT: 287 LBS | HEIGHT: 72 IN | TEMPERATURE: 99.4 F | HEART RATE: 67 BPM | DIASTOLIC BLOOD PRESSURE: 77 MMHG | SYSTOLIC BLOOD PRESSURE: 126 MMHG | RESPIRATION RATE: 18 BRPM | OXYGEN SATURATION: 95 % | BODY MASS INDEX: 38.87 KG/M2

## 2018-07-25 DIAGNOSIS — I82.431: Primary | ICD-10-CM

## 2018-07-25 LAB
ALBUMIN SERPL-MCNC: 2.9 G/DL (ref 3.2–4.6)
ALBUMIN/GLOB SERPL: 0.5 {RATIO} (ref 1.2–3.5)
ALP SERPL-CCNC: 106 U/L (ref 50–136)
ALT SERPL-CCNC: 39 U/L (ref 12–65)
ANION GAP SERPL CALC-SCNC: 8 MMOL/L (ref 7–16)
AST SERPL-CCNC: 29 U/L (ref 15–37)
BASOPHILS # BLD: 0 K/UL (ref 0–0.2)
BASOPHILS NFR BLD: 1 % (ref 0–2)
BILIRUB SERPL-MCNC: 0.3 MG/DL (ref 0.2–1.1)
BUN SERPL-MCNC: 13 MG/DL (ref 8–23)
CALCIUM SERPL-MCNC: 8.5 MG/DL (ref 8.3–10.4)
CHLORIDE SERPL-SCNC: 100 MMOL/L (ref 98–107)
CO2 SERPL-SCNC: 32 MMOL/L (ref 21–32)
CREAT SERPL-MCNC: 0.86 MG/DL (ref 0.8–1.5)
DIFFERENTIAL METHOD BLD: ABNORMAL
EOSINOPHIL # BLD: 0.2 K/UL (ref 0–0.8)
EOSINOPHIL NFR BLD: 4 % (ref 0.5–7.8)
ERYTHROCYTE [DISTWIDTH] IN BLOOD BY AUTOMATED COUNT: 13.1 % (ref 11.9–14.6)
GLOBULIN SER CALC-MCNC: 5.4 G/DL (ref 2.3–3.5)
GLUCOSE SERPL-MCNC: 151 MG/DL (ref 65–100)
HCT VFR BLD AUTO: 41.8 % (ref 41.1–50.3)
HGB BLD-MCNC: 14.7 G/DL (ref 13.6–17.2)
IMM GRANULOCYTES # BLD: 0 K/UL (ref 0–0.5)
IMM GRANULOCYTES NFR BLD AUTO: 0 % (ref 0–5)
LYMPHOCYTES # BLD: 1.6 K/UL (ref 0.5–4.6)
LYMPHOCYTES NFR BLD: 33 % (ref 13–44)
MCH RBC QN AUTO: 34.3 PG (ref 26.1–32.9)
MCHC RBC AUTO-ENTMCNC: 35.2 G/DL (ref 31.4–35)
MCV RBC AUTO: 97.4 FL (ref 79.6–97.8)
MONOCYTES # BLD: 0.5 K/UL (ref 0.1–1.3)
MONOCYTES NFR BLD: 11 % (ref 4–12)
NEUTS SEG # BLD: 2.6 K/UL (ref 1.7–8.2)
NEUTS SEG NFR BLD: 51 % (ref 43–78)
PLATELET # BLD AUTO: 238 K/UL (ref 150–450)
PMV BLD AUTO: 9.7 FL (ref 10.8–14.1)
POTASSIUM SERPL-SCNC: 3.6 MMOL/L (ref 3.5–5.1)
PROT SERPL-MCNC: 8.3 G/DL (ref 6.3–8.2)
RBC # BLD AUTO: 4.29 M/UL (ref 4.23–5.67)
SODIUM SERPL-SCNC: 140 MMOL/L (ref 136–145)
WBC # BLD AUTO: 4.9 K/UL (ref 4.3–11.1)

## 2018-07-25 PROCEDURE — 93971 EXTREMITY STUDY: CPT

## 2018-07-25 PROCEDURE — 99283 EMERGENCY DEPT VISIT LOW MDM: CPT | Performed by: EMERGENCY MEDICINE

## 2018-07-25 PROCEDURE — 85025 COMPLETE CBC W/AUTO DIFF WBC: CPT | Performed by: STUDENT IN AN ORGANIZED HEALTH CARE EDUCATION/TRAINING PROGRAM

## 2018-07-25 PROCEDURE — 80053 COMPREHEN METABOLIC PANEL: CPT | Performed by: STUDENT IN AN ORGANIZED HEALTH CARE EDUCATION/TRAINING PROGRAM

## 2018-07-25 NOTE — ED TRIAGE NOTES
Patient reports that he has pain from the right behind the right knee and calf. States that he was seen at urgent care and that she wanted him to come to be evaluated for a blood clot. No redness or swelling observed.

## 2018-07-25 NOTE — ED PROVIDER NOTES
HPI Comments: Since last weekend he has had some soreness to his right posterior knee and upper calf. Denies any chest pain or shortness of breath. Has had extensive orthopedic interventions involving the knee and ankle on the opposite side. He has no history of blood clot. history of hypertension. History of peripheral neuropathy. No fever or redness to the area. Patient is a 67 y.o. male presenting with leg pain. The history is provided by the patient. Leg Pain    This is a new problem. The problem occurs constantly. The pain is present in the right knee and right lower leg. The pain is moderate. Pertinent negatives include no numbness and no tingling. The symptoms are aggravated by palpation. He has tried nothing for the symptoms.         Past Medical History:   Diagnosis Date    Anxiety     daily meds    Cancer ([address] 2010    prostate-surg only    Chronic pain     all over     Hyperlipidemia     treats with medication    Hypertension     controlled with meds    Morbid obesity ([address]     BMI 40.6    Osteoarthritis     generalized    Personal history of prostate cancer     Seizures ([address] first one 29's    last one age 43, controlled with meds    Sleep apnea     uses CPAP    Thromboembolus ([address] ~2009    after sx, left lower extremity        Past Surgical History:   Procedure Laterality Date    HX BLADDER SUSPENSION      HX CATARACT REMOVAL Bilateral     HX KNEE ARTHROSCOPY      on right in 2005 and left in 2006    HX KNEE REPLACEMENT      to L    HX ORTHOPAEDIC      ankle left x2    HX OTHER SURGICAL      skin cancer removed from top of head    HX PROSTATECTOMY  12/2010         Family History:   Problem Relation Age of Onset    Diabetes Father     Heart Disease Father     Stroke Mother     Hypertension Mother     Hypertension Sister     Heart Disease Sister      CHF    Malignant Hyperthermia Neg Hx     Pseudocholinesterase Deficiency Neg Hx     Delayed Awakening Neg Hx     Post-op Nausea/Vomiting Neg Hx     Emergence Delirium Neg Hx     Post-op Cognitive Dysfunction Neg Hx     Other Neg Hx        Social History     Social History    Marital status:      Spouse name: N/A    Number of children: N/A    Years of education: N/A     Occupational History    Not on file. Social History Main Topics    Smoking status: Never Smoker    Smokeless tobacco: Never Used    Alcohol use No    Drug use: No    Sexual activity: Not on file     Other Topics Concern    Not on file     Social History Narrative         ALLERGIES: Adhesive and Bactrim [sulfamethoxazole-trimethoprim]    Review of Systems   Constitutional: Negative for chills and fever. Respiratory: Negative. Negative for cough, shortness of breath and wheezing. Cardiovascular:        Right popliteal and calf region discomfort   Genitourinary: Negative. Neurological: Negative for tingling and numbness. All other systems reviewed and are negative. Vitals:    07/25/18 1718 07/25/18 1838   BP: 129/67 126/75   Pulse: 76 68   Resp: 18    Temp: 99.4 °F (37.4 °C)    SpO2: 94% 93%   Weight: 130.2 kg (287 lb)    Height: 6' (1.829 m)             Physical Exam   Constitutional: He appears well-developed and well-nourished. No distress. HENT:   Head: Atraumatic. Eyes: No scleral icterus. Neck: Neck supple. Cardiovascular: Normal rate and intact distal pulses. Pulmonary/Chest: Effort normal. No respiratory distress. Abdominal: Soft. There is no tenderness. There is no rebound. Musculoskeletal: He exhibits tenderness. And are to right popliteal and predominantly proximal calf region. No associated redness or define cords. Normal distal neurovascular   Neurological: He is alert. Skin: Skin is warm and dry. No erythema. No pallor. Psychiatric: His behavior is normal. Thought content normal.   Nursing note and vitals reviewed.        MDM  Number of Diagnoses or Management Options  Popliteal vein thrombosis, right [facility]):   Diagnosis management comments: Tender to the right With findings worrisome for deep venous thrombosis or similar. Ultrasounds confirming of this. Discussion of options presented the patient and Xarelto is begun. Amount and/or Complexity of Data Reviewed  Tests in the radiology section of CPT®: reviewed and ordered  Obtain history from someone other than the patient: yes  Independent visualization of images, tracings, or specimens: yes    Risk of Complications, Morbidity, and/or Mortality  Presenting problems: moderate  Diagnostic procedures: low  Management options: moderate    Patient Progress  Patient progress: stable        ED Course       Procedures  Final result (Exam End: 7/25/2018  7:23 PM) Open        Study Result      HISTORY: 3 day history of right lower extremity swelling and pain.     Exam: Right lower extremity ultrasound     Technique: Realtime grayscale and color Doppler imaging is performed in the  longitudinal and transverse planes.     FINDINGS: There is DVT within the right popliteal vein, posterior tibial vein,  and peroneal vein. No DVT within the remaining deep venous structures of the  right lower extremity. No Baker's cyst.     IMPRESSIONS: DVT within the right popliteal vein, posterior tibial vein, and  peroneal vein.

## 2018-07-26 NOTE — DISCHARGE INSTRUCTIONS
Deep Vein Thrombosis: Care Instructions  Your Care Instructions    A deep vein thrombosis (DVT) is a blood clot in certain veins of the legs, pelvis, or arms. Blood clots in these veins need to be treated because they can get bigger, break loose, and travel through the bloodstream to the lungs. A blood clot in a lung can be life-threatening. The doctor may have given you a blood thinner (anticoagulant). A blood thinner can stop the blood clot from growing larger and prevent new clots from forming. You will need to take a blood thinner for 3 to 6 months or longer. The doctor has checked you carefully, but problems can develop later. If you notice any problems or new symptoms, get medical treatment right away. Follow-up care is a key part of your treatment and safety. Be sure to make and go to all appointments, and call your doctor if you are having problems. It's also a good idea to know your test results and keep a list of the medicines you take. How can you care for yourself at home? · Take your medicines exactly as prescribed. Call your doctor if you think you are having a problem with your medicine. · If you are taking a blood thinner, be sure you get instructions about how to take your medicine safely. Blood thinners can cause serious bleeding problems. · Wear compression stockings if your doctor recommends them. These stockings are tighter at the feet than on the legs. They may reduce pain and swelling in your legs. But there are different types of stockings, and they need to fit right. So your doctor will recommend what you need. · When you sit, use a pillow to raise the arm or leg that has the blood clot. Try to keep it above the level of your heart. When should you call for help? Call 911 anytime you think you may need emergency care. For example, call if:    · You passed out (lost consciousness).     · You have symptoms of a blood clot in your lung (called a pulmonary embolism).  These include:  ¨ Sudden chest pain. ¨ Trouble breathing. ¨ Coughing up blood.    Call your doctor now or seek immediate medical care if:    · You have new or worse trouble breathing.     · You are dizzy or lightheaded, or you feel like you may faint.     · You have symptoms of a blood clot in your arm or leg. These may include:  ¨ Pain in the arm, calf, back of the knee, thigh, or groin. ¨ Redness and swelling in the arm, leg, or groin.    Watch closely for changes in your health, and be sure to contact your doctor if:    · You do not get better as expected. Where can you learn more? Go to http://clarice-carlitos.info/. Enter Q010 in the search box to learn more about \"Deep Vein Thrombosis: Care Instructions. \"  Current as of: November 21, 2017  Content Version: 11.7  © 0306-6116 [employer]. Care instructions adapted under license by [employer] (which disclaims liability or warranty for this information). If you have questions about a medical condition or this instruction, always ask your healthcare professional. [address] any warranty or liability for your use of this information.

## 2018-07-26 NOTE — ED NOTES
I have reviewed discharge instructions with the patient. The patient verbalized understanding. Patient left ED via Discharge Method: wheelchair to Home with wife). Opportunity for questions and clarification provided. Patient given 1 scripts. To continue your aftercare when you leave the hospital, you may receive an automated call from our care team to check in on how you are doing. This is a free service and part of our promise to provide the best care and service to meet your aftercare needs.  If you have questions, or wish to unsubscribe from this service please call [phone]. Thank you for Choosing our [facility] Emergency Department.

## 2018-09-07 PROBLEM — E34.9 HYPOTESTOSTERONEMIA: Status: ACTIVE | Noted: 2018-09-07

## 2018-09-07 PROBLEM — E66.01 SEVERE OBESITY (BMI 35.0-39.9): Status: ACTIVE | Noted: 2018-09-07

## 2018-12-12 ENCOUNTER — HOSPITAL ENCOUNTER (OUTPATIENT)
Dept: LAB | Age: 73
Discharge: HOME | End: 2018-12-12

## 2018-12-12 PROCEDURE — 88305 TISSUE EXAM BY PATHOLOGIST: CPT

## 2019-03-28 ENCOUNTER — APPOINTMENT (OUTPATIENT)
Dept: GENERAL RADIOLOGY | Age: 74
DRG: 286 | End: 2019-03-28
Attending: EMERGENCY MEDICINE
Payer: MEDICARE

## 2019-03-28 ENCOUNTER — APPOINTMENT (OUTPATIENT)
Dept: CT IMAGING | Age: 74
DRG: 286 | End: 2019-03-28
Attending: INTERNAL MEDICINE
Payer: MEDICARE

## 2019-03-28 ENCOUNTER — HOSPITAL ENCOUNTER (INPATIENT)
Age: 74
LOS: 1 days | Discharge: HOME HEALTH | DRG: 286 | End: 2019-03-31
Attending: EMERGENCY MEDICINE | Admitting: INTERNAL MEDICINE
Payer: MEDICARE

## 2019-03-28 DIAGNOSIS — R07.9 CHEST PAIN, UNSPECIFIED TYPE: ICD-10-CM

## 2019-03-28 DIAGNOSIS — R09.02 HYPOXIA: Primary | ICD-10-CM

## 2019-03-28 PROBLEM — J18.9 ACUTE PNEUMONITIS: Status: ACTIVE | Noted: 2019-03-28

## 2019-03-28 PROBLEM — E78.5 HLD (HYPERLIPIDEMIA): Chronic | Status: ACTIVE | Noted: 2019-03-28

## 2019-03-28 PROBLEM — G47.33 OSA (OBSTRUCTIVE SLEEP APNEA): Status: ACTIVE | Noted: 2019-03-28

## 2019-03-28 PROBLEM — G47.33 OSA (OBSTRUCTIVE SLEEP APNEA): Chronic | Status: ACTIVE | Noted: 2019-03-28

## 2019-03-28 PROBLEM — E66.01 MORBID OBESITY (HCC): Chronic | Status: ACTIVE | Noted: 2019-03-28

## 2019-03-28 PROBLEM — G47.33 OSA (OBSTRUCTIVE SLEEP APNEA): Chronic | Status: ACTIVE | Noted: 2019-03-27

## 2019-03-28 LAB
ALBUMIN SERPL-MCNC: 3.7 G/DL (ref 3.2–4.6)
ALBUMIN/GLOB SERPL: 1 {RATIO} (ref 1.2–3.5)
ALP SERPL-CCNC: 107 U/L (ref 50–136)
ALT SERPL-CCNC: 34 U/L (ref 12–65)
ANION GAP SERPL CALC-SCNC: 4 MMOL/L (ref 7–16)
AST SERPL-CCNC: 14 U/L (ref 15–37)
ATRIAL RATE: 326 BPM
BASOPHILS # BLD: 0 K/UL (ref 0–0.2)
BASOPHILS NFR BLD: 1 % (ref 0–2)
BILIRUB SERPL-MCNC: 0.4 MG/DL (ref 0.2–1.1)
BNP SERPL-MCNC: 40 PG/ML
BUN SERPL-MCNC: 12 MG/DL (ref 8–23)
CALCIUM SERPL-MCNC: 9 MG/DL (ref 8.3–10.4)
CALCULATED R AXIS, ECG10: 49 DEGREES
CALCULATED T AXIS, ECG11: 85 DEGREES
CHLORIDE SERPL-SCNC: 101 MMOL/L (ref 98–107)
CO2 SERPL-SCNC: 36 MMOL/L (ref 21–32)
CREAT SERPL-MCNC: 0.82 MG/DL (ref 0.8–1.5)
D DIMER PPP FEU-MCNC: 0.4 UG/ML(FEU)
DIAGNOSIS, 93000: NORMAL
DIFFERENTIAL METHOD BLD: ABNORMAL
EOSINOPHIL # BLD: 0.2 K/UL (ref 0–0.8)
EOSINOPHIL NFR BLD: 3 % (ref 0.5–7.8)
ERYTHROCYTE [DISTWIDTH] IN BLOOD BY AUTOMATED COUNT: 12.2 % (ref 11.9–14.6)
GLOBULIN SER CALC-MCNC: 3.6 G/DL (ref 2.3–3.5)
GLUCOSE SERPL-MCNC: 145 MG/DL (ref 65–100)
HCT VFR BLD AUTO: 46.2 % (ref 41.1–50.3)
HGB BLD-MCNC: 15.5 G/DL (ref 13.6–17.2)
IMM GRANULOCYTES # BLD AUTO: 0 K/UL (ref 0–0.5)
IMM GRANULOCYTES NFR BLD AUTO: 1 % (ref 0–5)
LIPASE SERPL-CCNC: 130 U/L (ref 73–393)
LYMPHOCYTES # BLD: 1.6 K/UL (ref 0.5–4.6)
LYMPHOCYTES NFR BLD: 26 % (ref 13–44)
MCH RBC QN AUTO: 33.4 PG (ref 26.1–32.9)
MCHC RBC AUTO-ENTMCNC: 33.5 G/DL (ref 31.4–35)
MCV RBC AUTO: 99.6 FL (ref 79.6–97.8)
MONOCYTES # BLD: 0.4 K/UL (ref 0.1–1.3)
MONOCYTES NFR BLD: 7 % (ref 4–12)
NEUTS SEG # BLD: 3.8 K/UL (ref 1.7–8.2)
NEUTS SEG NFR BLD: 62 % (ref 43–78)
NRBC # BLD: 0 K/UL (ref 0–0.2)
PLATELET # BLD AUTO: 198 K/UL (ref 150–450)
PMV BLD AUTO: 9.8 FL (ref 9.4–12.3)
POTASSIUM SERPL-SCNC: 3.8 MMOL/L (ref 3.5–5.1)
PROT SERPL-MCNC: 7.3 G/DL (ref 6.3–8.2)
Q-T INTERVAL, ECG07: 436 MS
QRS DURATION, ECG06: 88 MS
QTC CALCULATION (BEZET), ECG08: 436 MS
RBC # BLD AUTO: 4.64 M/UL (ref 4.23–5.6)
SODIUM SERPL-SCNC: 141 MMOL/L (ref 136–145)
TROPONIN I BLD-MCNC: 0 NG/ML (ref 0.02–0.05)
TROPONIN I SERPL-MCNC: <0.02 NG/ML (ref 0.02–0.05)
TROPONIN I SERPL-MCNC: <0.02 NG/ML (ref 0.02–0.05)
VENTRICULAR RATE, ECG03: 60 BPM
WBC # BLD AUTO: 6 K/UL (ref 4.3–11.1)

## 2019-03-28 PROCEDURE — 93005 ELECTROCARDIOGRAM TRACING: CPT | Performed by: EMERGENCY MEDICINE

## 2019-03-28 PROCEDURE — 84484 ASSAY OF TROPONIN QUANT: CPT

## 2019-03-28 PROCEDURE — 85025 COMPLETE CBC W/AUTO DIFF WBC: CPT

## 2019-03-28 PROCEDURE — 36415 COLL VENOUS BLD VENIPUNCTURE: CPT

## 2019-03-28 PROCEDURE — 77030020263 HC SOL INJ SOD CL0.9% LFCR 1000ML

## 2019-03-28 PROCEDURE — 74011250637 HC RX REV CODE- 250/637: Performed by: INTERNAL MEDICINE

## 2019-03-28 PROCEDURE — 99218 HC RM OBSERVATION: CPT

## 2019-03-28 PROCEDURE — 99285 EMERGENCY DEPT VISIT HI MDM: CPT | Performed by: EMERGENCY MEDICINE

## 2019-03-28 PROCEDURE — 74011250636 HC RX REV CODE- 250/636: Performed by: PHYSICIAN ASSISTANT

## 2019-03-28 PROCEDURE — 71046 X-RAY EXAM CHEST 2 VIEWS: CPT

## 2019-03-28 PROCEDURE — 83880 ASSAY OF NATRIURETIC PEPTIDE: CPT

## 2019-03-28 PROCEDURE — 71250 CT THORAX DX C-: CPT

## 2019-03-28 PROCEDURE — 74011000250 HC RX REV CODE- 250: Performed by: INTERNAL MEDICINE

## 2019-03-28 PROCEDURE — 94640 AIRWAY INHALATION TREATMENT: CPT

## 2019-03-28 PROCEDURE — 85379 FIBRIN DEGRADATION QUANT: CPT

## 2019-03-28 PROCEDURE — 83690 ASSAY OF LIPASE: CPT

## 2019-03-28 PROCEDURE — 74011250636 HC RX REV CODE- 250/636: Performed by: INTERNAL MEDICINE

## 2019-03-28 PROCEDURE — 80053 COMPREHEN METABOLIC PANEL: CPT

## 2019-03-28 PROCEDURE — 77010033678 HC OXYGEN DAILY

## 2019-03-28 RX ORDER — ATORVASTATIN CALCIUM 40 MG/1
40 TABLET, FILM COATED ORAL
Status: DISCONTINUED | OUTPATIENT
Start: 2019-03-28 | End: 2019-03-31

## 2019-03-28 RX ORDER — ALBUTEROL SULFATE 0.83 MG/ML
2.5 SOLUTION RESPIRATORY (INHALATION)
Status: DISCONTINUED | OUTPATIENT
Start: 2019-03-28 | End: 2019-03-31 | Stop reason: HOSPADM

## 2019-03-28 RX ORDER — ALBUTEROL SULFATE 0.83 MG/ML
2.5 SOLUTION RESPIRATORY (INHALATION)
Status: DISCONTINUED | OUTPATIENT
Start: 2019-03-28 | End: 2019-03-28

## 2019-03-28 RX ORDER — SODIUM CHLORIDE 9 MG/ML
75 INJECTION, SOLUTION INTRAVENOUS CONTINUOUS
Status: DISCONTINUED | OUTPATIENT
Start: 2019-03-29 | End: 2019-03-31 | Stop reason: HOSPADM

## 2019-03-28 RX ORDER — DIPHENHYDRAMINE HCL 25 MG
25 CAPSULE ORAL
Status: DISCONTINUED | OUTPATIENT
Start: 2019-03-28 | End: 2019-03-31 | Stop reason: HOSPADM

## 2019-03-28 RX ORDER — SODIUM CHLORIDE 0.9 % (FLUSH) 0.9 %
5-40 SYRINGE (ML) INJECTION AS NEEDED
Status: DISCONTINUED | OUTPATIENT
Start: 2019-03-28 | End: 2019-03-31 | Stop reason: HOSPADM

## 2019-03-28 RX ORDER — ENOXAPARIN SODIUM 100 MG/ML
40 INJECTION SUBCUTANEOUS EVERY 24 HOURS
Status: DISCONTINUED | OUTPATIENT
Start: 2019-03-28 | End: 2019-03-29

## 2019-03-28 RX ORDER — LORAZEPAM 0.5 MG/1
0.5 TABLET ORAL
Status: DISCONTINUED | OUTPATIENT
Start: 2019-03-28 | End: 2019-03-31 | Stop reason: HOSPADM

## 2019-03-28 RX ORDER — PHENOBARBITAL 32.4 MG/1
97.2 TABLET ORAL
COMMUNITY

## 2019-03-28 RX ORDER — ONDANSETRON 2 MG/ML
4 INJECTION INTRAMUSCULAR; INTRAVENOUS
Status: DISCONTINUED | OUTPATIENT
Start: 2019-03-28 | End: 2019-03-31 | Stop reason: HOSPADM

## 2019-03-28 RX ORDER — PREGABALIN 150 MG/1
150 CAPSULE ORAL 2 TIMES DAILY
Status: DISCONTINUED | OUTPATIENT
Start: 2019-03-28 | End: 2019-03-31 | Stop reason: HOSPADM

## 2019-03-28 RX ORDER — HYDROCODONE BITARTRATE AND ACETAMINOPHEN 5; 325 MG/1; MG/1
1 TABLET ORAL
Status: DISCONTINUED | OUTPATIENT
Start: 2019-03-28 | End: 2019-03-31 | Stop reason: HOSPADM

## 2019-03-28 RX ORDER — HYDRALAZINE HYDROCHLORIDE 20 MG/ML
20 INJECTION INTRAMUSCULAR; INTRAVENOUS
Status: DISCONTINUED | OUTPATIENT
Start: 2019-03-28 | End: 2019-03-31 | Stop reason: HOSPADM

## 2019-03-28 RX ORDER — SODIUM CHLORIDE 0.9 % (FLUSH) 0.9 %
5-40 SYRINGE (ML) INJECTION EVERY 8 HOURS
Status: DISCONTINUED | OUTPATIENT
Start: 2019-03-28 | End: 2019-03-31 | Stop reason: HOSPADM

## 2019-03-28 RX ORDER — LANOLIN ALCOHOL/MO/W.PET/CERES
1000 CREAM (GRAM) TOPICAL DAILY
Status: DISCONTINUED | OUTPATIENT
Start: 2019-03-29 | End: 2019-03-31

## 2019-03-28 RX ORDER — AMLODIPINE BESYLATE 5 MG/1
5 TABLET ORAL DAILY
Status: DISCONTINUED | OUTPATIENT
Start: 2019-03-29 | End: 2019-03-31

## 2019-03-28 RX ORDER — HYDROCHLOROTHIAZIDE 25 MG/1
25 TABLET ORAL DAILY
Status: DISCONTINUED | OUTPATIENT
Start: 2019-03-29 | End: 2019-03-28

## 2019-03-28 RX ORDER — ACETAMINOPHEN 325 MG/1
650 TABLET ORAL
Status: DISCONTINUED | OUTPATIENT
Start: 2019-03-28 | End: 2019-03-31 | Stop reason: HOSPADM

## 2019-03-28 RX ORDER — MELATONIN
2000 DAILY
Status: DISCONTINUED | OUTPATIENT
Start: 2019-03-29 | End: 2019-03-31 | Stop reason: HOSPADM

## 2019-03-28 RX ORDER — NALOXONE HYDROCHLORIDE 0.4 MG/ML
0.4 INJECTION, SOLUTION INTRAMUSCULAR; INTRAVENOUS; SUBCUTANEOUS AS NEEDED
Status: DISCONTINUED | OUTPATIENT
Start: 2019-03-28 | End: 2019-03-31 | Stop reason: HOSPADM

## 2019-03-28 RX ORDER — PHENOBARBITAL 97.2 MG/1
97.2 TABLET ORAL
Status: DISCONTINUED | OUTPATIENT
Start: 2019-03-28 | End: 2019-03-31 | Stop reason: HOSPADM

## 2019-03-28 RX ORDER — GUAIFENESIN 100 MG/5ML
81 LIQUID (ML) ORAL
Status: DISCONTINUED | OUTPATIENT
Start: 2019-03-28 | End: 2019-03-31 | Stop reason: HOSPADM

## 2019-03-28 RX ORDER — BISACODYL 5 MG
10 TABLET, DELAYED RELEASE (ENTERIC COATED) ORAL DAILY PRN
Status: DISCONTINUED | OUTPATIENT
Start: 2019-03-28 | End: 2019-03-31 | Stop reason: HOSPADM

## 2019-03-28 RX ORDER — LOSARTAN POTASSIUM 50 MG/1
100 TABLET ORAL DAILY
Status: DISCONTINUED | OUTPATIENT
Start: 2019-03-29 | End: 2019-03-31 | Stop reason: HOSPADM

## 2019-03-28 RX ADMIN — ASPIRIN 81 MG 81 MG: 81 TABLET ORAL at 20:38

## 2019-03-28 RX ADMIN — ENOXAPARIN SODIUM 40 MG: 40 INJECTION SUBCUTANEOUS at 16:51

## 2019-03-28 RX ADMIN — ATORVASTATIN CALCIUM 40 MG: 40 TABLET, FILM COATED ORAL at 20:38

## 2019-03-28 RX ADMIN — ALBUTEROL SULFATE 2.5 MG: 2.5 SOLUTION RESPIRATORY (INHALATION) at 19:15

## 2019-03-28 RX ADMIN — ALBUTEROL SULFATE 2.5 MG: 2.5 SOLUTION RESPIRATORY (INHALATION) at 14:10

## 2019-03-28 RX ADMIN — PREGABALIN 150 MG: 150 CAPSULE ORAL at 20:38

## 2019-03-28 RX ADMIN — PHENOBARBITAL 97.2 MG: 97.2 TABLET ORAL at 20:38

## 2019-03-28 RX ADMIN — Medication 10 ML: at 20:39

## 2019-03-28 RX ADMIN — Medication 5 ML: at 16:52

## 2019-03-28 RX ADMIN — SODIUM CHLORIDE 75 ML/HR: 900 INJECTION, SOLUTION INTRAVENOUS at 20:37

## 2019-03-28 NOTE — ED TRIAGE NOTES
Pt arrives to the ED from home c/o of chest pain that started about an hour ago, denies radiating, ems gave 3 nitro and 324 ASA, pain down to 2 rated on 0-10. NSR no cardiac hx.

## 2019-03-28 NOTE — PROGRESS NOTES
Patient stable with no complaints at this time. Patient is resting in bed watching TV. Call light within reach and patient instructed to call if assistance is needed. Report to be given to oncoming RN 7p-7a

## 2019-03-28 NOTE — PROGRESS NOTES
Patient arrived to room 803 via transport from ED. Patient is accompanied by wife. Patient is alert and orientated with no distress noted. Patient denies any chest pain at this time. Respirations even and unlabored with heart rate regular. Duel skin assessment completed with Trip Marrufo RN. Patient has a small scab on right knee, small sore on right elbow, and a tore fingernail. Wound to be consulted for finger. Patient able to ambulate independently with walker which is what is used at home also. Patient orientated to room and surroundings. Bed in low locked position with call light within reach. Patient instructed to call if assistance is needed. Will continue to monitor.

## 2019-03-28 NOTE — CONSULTS
[facility] Cardiology Consult Date of  Admission: 3/28/2019 10:18 AM  
 
Primary Care Physician: Dr Sandra Garcia Primary Cardiologist: None Referring Physician: Dr Shaji Ribera Consulting Physician: Dr Hawk Lowry 
 
CC/Reason for consult: chest pain Irving Aviles is a 68 y.o. male admitted for Chest pain [R07.9]. He has a h/o seizures, DJD, peripheral neuropathy, htn, hld, YAHAIRA using CPAP and prostate ca (last PSA 0). He is not very active, able to get a short distance around his house with his rolling walker before getting SOB, but this is unchanged. No prior CP until he woke this morning at 9 AM and had 5/10 epigastric/SSCP which did not radiate and felt like a punch in his mid-chest.  + dyspnea, no diaphoresis or nausea. Pain was constant for 1 1/2 hours until he called EMS and was given nitro x 3 and pain gradually eased and resolved. No palpitations, rare dizziness w standing, no syncope. No h/o CAD, CHF or arrythmia. Exercise and nuclear stress test 15 years ago normal.  No f/h of CAD. No h/o tobacco use. In ER trop negative x 2, WBC 6, hgb 15.5, , K 3.8, cr .82, CT chest showed coronary atherosclerosis. EKG NSR W rate 60 w PVC. BNP 40. /72. Initial RA sat 87%. He was recently exposed to dust and was thought to have possible pneumonitis. Patient Active Problem List  
Diagnosis Code  Osteoarthritis of right knee M17.11  
 Status post total knee replacement Z96.659  Hypertension I10  
 Seizure disorder ([address] G40.909  Hyperglycemia R73.9  Malignant neoplasm of prostate ([address] C61  Bladder neck obstruction N32.0  Achilles tendon injury S86.009A  Tibialis tendinitis M76.829  
 Male stress incontinence N39.3  Stress incontinence N39.3  Cuff erosion of artificial urinary sphincter ([address] T83.111A  S/P cystoscopy Z98.890  
 Hematuria, gross R31.0  Radiation cystitis N30.40  Severe obesity (BMI 35.0-39. 9) E66.01  
 Hypotestosteronemia E34.9  Chest pain R07.9  Acute pneumonitis J18.9  YAHAIRA (obstructive sleep apnea) G47.33  
 HLD (hyperlipidemia) E78.5  Morbid obesity ([address] E66.01 Past Medical History:  
Diagnosis Date  Anxiety   
 daily meds  Cancer ([facility] 2010  
 prostate-surg only  Chronic pain   
 all over  Hyperlipidemia   
 treats with medication  Hypertension   
 controlled with meds  Morbid obesity ([address] BMI 40.6  Osteoarthritis   
 generalized  Personal history of prostate cancer  Seizures ([facility] [facility] first one 29's  
 last one age 43, controlled with meds  Sleep apnea   
 uses CPAP  Thromboembolus ([facility] [address] ~2009  
 after sx, left lower extremity Past Surgical History:  
Procedure Laterality Date  HX BLADDER SUSPENSION    
 HX CATARACT REMOVAL Bilateral   
 HX KNEE ARTHROSCOPY    
 on right in 2005 and left in 2006  HX KNEE REPLACEMENT    
 to L  
 HX ORTHOPAEDIC    
 ankle left x2  
 HX OTHER SURGICAL    
 skin cancer removed from top of head  HX PROSTATECTOMY  12/2010 Allergies Allergen Reactions  Adhesive Rash  Bactrim [Sulfamethoxazole-Trimethoprim] Hives Family History Problem Relation Age of Onset  Diabetes Father  Heart Disease Father  Stroke Mother  Hypertension Mother  Hypertension Sister  Heart Disease Sister CHF  Malignant Hyperthermia Neg Hx  Pseudocholinesterase Deficiency Neg Hx  Delayed Awakening Neg Hx  Post-op Nausea/Vomiting Neg Hx  Emergence Delirium Neg Hx  Post-op Cognitive Dysfunction Neg Hx   
 Other Neg Hx Social History Tobacco Use  Smoking status: Never Smoker  Smokeless tobacco: Never Used Substance Use Topics  Alcohol use: No  
  
 
Current Facility-Administered Medications Medication Dose Route Frequency  [START ON 3/29/2019] amLODIPine (NORVASC) tablet 5 mg  5 mg Oral DAILY  aspirin chewable tablet 81 mg  81 mg Oral QHS  atorvastatin (LIPITOR) tablet 40 mg  40 mg Oral QHS  [START ON 3/29/2019] cholecalciferol (VITAMIN D3) tablet 2,000 Units  2,000 Units Oral DAILY  [START ON 3/29/2019] cyanocobalamin tablet 1,000 mcg  1,000 mcg Oral DAILY  [START ON 3/29/2019] hydroCHLOROthiazide (HYDRODIURIL) tablet 25 mg  25 mg Oral DAILY  [START ON 3/29/2019] losartan (COZAAR) tablet 100 mg  100 mg Oral DAILY  PHENobarbital (LUMINAL) tablet 97.2 mg  97.2 mg Oral QHS  pregabalin (LYRICA) capsule 150 mg  150 mg Oral BID  hydrALAZINE (APRESOLINE) 20 mg/mL injection 20 mg  20 mg IntraVENous Q4H PRN  
 sodium chloride (NS) flush 5-40 mL  5-40 mL IntraVENous Q8H  
 sodium chloride (NS) flush 5-40 mL  5-40 mL IntraVENous PRN  
 acetaminophen (TYLENOL) tablet 650 mg  650 mg Oral Q4H PRN  
 HYDROcodone-acetaminophen (NORCO) 5-325 mg per tablet 1 Tab  1 Tab Oral Q4H PRN  
 naloxone (NARCAN) injection 0.4 mg  0.4 mg IntraVENous PRN  
 diphenhydrAMINE (BENADRYL) capsule 25 mg  25 mg Oral Q4H PRN  
 ondansetron (ZOFRAN) injection 4 mg  4 mg IntraVENous Q4H PRN  
 bisacodyl (DULCOLAX) tablet 10 mg  10 mg Oral DAILY PRN  
 LORazepam (ATIVAN) tablet 0.5 mg  0.5 mg Oral Q4H PRN  
 enoxaparin (LOVENOX) injection 40 mg  40 mg SubCUTAneous Q24H  
 albuterol (PROVENTIL VENTOLIN) nebulizer solution 2.5 mg  2.5 mg Nebulization Q6H RT  
 
 
Review of Symptoms: 
General: no weight change,  no weakness, fever or chills Skin: no rashes, lumps, or other skin changes HEENT: no headache, dizziness, lightheadedness, vision changes, hearing changes, tinnitus, vertigo, sinus pressure/pain, bleeding gums, sore throat, or hoarseness Neck: no swollen glands, goiter, pain or stiffness Respiratory: no cough, sputum, hemoptysis, + dyspnea w exertion, nowheezing Cardiovascular: + as per HPI Gastrointestinal: no GERD, constipation, diarrhea, liver problems, or h/o GI bleed Urinary: no frequency, urgency , hematuria, burning/pain with urination, recent flank pain, polyuria, nocturia, or difficulty urinating Peripheral Vascular: no claudication, leg cramps, prior DVTs, swelling of calves, legs, or feet, color change, or swelling with redness or tenderness Musculoskeletal: + DJD Psychiatric: no depression or excessive stress Neurological: + neuropathy, seizures Hematologic: no anemia, easy bruising or bleeding Endocrine: no thyroid problems, heat or cold intolerance, excessive sweating, polyuria, polydipsia, no diabetes. Physical Exam 
Vitals:  
 03/28/19 1435 03/28/19 1436 03/28/19 1500 03/28/19 1517 BP:    136/76 Pulse:  (!) 52 (!) 53 (!) 57 Resp: 16   17 Temp:    97.7 °F (36.5 °C) SpO2:  93% 94% 97% Weight:      
Height:      
 
 
Physical Exam: 
General: Well Developed, Well Nourished, No Acute Distress HEENT: pupils equal and round, no abnormalities noted Neck: supple, no JVD, no carotid bruits Heart: S1S2 with RRR without murmurs or gallops Lungs: Clear throughout auscultation bilaterally without adventitious sounds Abd: soft, nontender, nondistended, with good bowel sounds Ext: warm, no edema, calves supple/nontender, pulses 2+ bilaterally Skin: warm and dry Psychiatric: Normal mood and affect Neurologic: Alert and oriented X 3 Labs:  
Recent Labs  
  03/28/19 
1042   
K 3.8 BUN 12  
CREA 0.82 * WBC 6.0 HGB 15.5 HCT 46.2  Assessment/Plan: 
 
 Assessment:  
 Chest pain (3/28/2019)- negative trop x 2, no ischemic changes on EKG, but risk factors including obesity, htn and hyperlipidemia and findings of CAD on CT chest.  Plan for C in AM 
 
Hypertension (5/6/2010)- cont norvasc, cozaar, hold hctz for cath Seizure disorder ([facility] (5/6/2010)- cont current meds Acute pneumonitis (3/28/2019)- albuterol neb YAHAIRA (obstructive sleep apnea) (3/27/2019)- CPAP 
 
HLD (hyperlipidemia) (3/28/2019)- statin, check lipids in AM  
 
Morbid obesity ([facility] [address] (3/28/2019) Thank you very much for this referral. We appreciate the opportunity to participate in this patient's care. We will follow along with above stated plan. Laurent Zamudio PA-C Consulting MD: [place]

## 2019-03-28 NOTE — PROGRESS NOTES
Consent signed and placed in chart. NPO sign placed on door and patient explained no to eat after MN. Will monitor.

## 2019-03-28 NOTE — ED NOTES
TRANSFER - OUT REPORT: 
 
Verbal report given to [address] [facility]  being transferred to [facility] for routine progression of care Report consisted of patients Situation, Background, Assessment and  
Recommendations(SBAR). Information from the following report(s) SBAR, MAR and Recent Results was reviewed with the receiving nurse. Lines:  
Peripheral IV Left Antecubital (Active) Site Assessment Clean, dry, & intact 3/28/2019 10:45 AM  
Phlebitis Assessment 0 3/28/2019 10:45 AM  
Infiltration Assessment 0 3/28/2019 10:45 AM  
Dressing Status Clean, dry, & intact 3/28/2019 10:45 AM  
Dressing Type Transparent 3/28/2019 10:45 AM  
  
 
Opportunity for questions and clarification was provided. Patient transported with: 
 O2 @ 3 liters

## 2019-03-28 NOTE — PROGRESS NOTES
Received bedside shift report from [facility], Greer Godwin. Patient resting quietly in bed at this time, awake, respirations even and unlabored on 2L NC. Denies needs at this time. Bed low and locked. Bedside table, personal belongings and call light within reach. Wife at bedside.

## 2019-03-28 NOTE — ED PROVIDER NOTES
HPI: 
68 male history of high blood pressure, high cholesterol no prior heart disease is here with chest discomfort. Stated he wears a CPAP at night. His oxygen level has been 8788%. Has had a cough. No leg swelling. This morning started having some chest pressure midsternal.  His oxygen level was low again this morning. When medic arrived they gave him 4 baby aspirin, sublingual nitro with significant improvement in chest discomfort. His oxygen level was 88% on room air here. He denies any fever, hemoptysis, recent travel. Not on blood thinner. ROS Constitutional: No fever, no chills Skin: no rash Eye: No vision changes ENMT: No sore throat Respiratory: No shortness of breath, + cough Cardiovascular: + chest pain, no palpitations Gastrointestinal: No vomiting, no nausea, no diarrhea, no abdominal pain : No dysuria MSK: No back pain, no muscle pain, no joint pain Neuro: No headache, no change in mental status, no numbness, no tingling, no weakness Psych:  
Endocrine:  
All other review of systems positive per history of present illness and the above otherwise negative or noncontributory. Visit Vitals /66 Pulse 64 Temp 98.2 °F (36.8 °C) Ht 6' (1.829 m) Wt 129.3 kg (285 lb) SpO2 94% BMI 38.65 kg/m² Past Medical History:  
Diagnosis Date  Anxiety   
 daily meds  Cancer ([address] 2010  
 prostate-surg only  Chronic pain   
 all over  Hyperlipidemia   
 treats with medication  Hypertension   
 controlled with meds  Morbid obesity ([address] BMI 40.6  Osteoarthritis   
 generalized  Personal history of prostate cancer  Seizures ([address] first one 29's  
 last one age 43, controlled with meds  Sleep apnea   
 uses CPAP  Thromboembolus ([address] ~2009  
 after sx, left lower extremity Past Surgical History:  
Procedure Laterality Date  HX BLADDER SUSPENSION    
 HX CATARACT REMOVAL Bilateral   
 HX KNEE ARTHROSCOPY on right in 2005 and left in 2006  HX KNEE REPLACEMENT    
 to L  
 HX ORTHOPAEDIC    
 ankle left x2  
 HX OTHER SURGICAL    
 skin cancer removed from top of head  HX PROSTATECTOMY  12/2010 Prior to Admission Medications Prescriptions Last Dose Informant Patient Reported? Taking? MULTI-VITAMIN PO   Yes No  
Sig: Take 1 Tab by mouth daily. PHENobarbital (LUMINAL) 97.2 mg tablet   No No  
Sig: Take 1 Tab by mouth nightly. Max Daily Amount: 97.2 mg. amLODIPine (NORVASC) 5 mg tablet   Yes No  
Sig: Take 5 mg by mouth daily. aspirin 81 mg chewable tablet   Yes No  
Sig: Take 81 mg by mouth nightly. Indications: prevention of cerebrovascular accident  
atorvastatin (LIPITOR) 40 mg tablet   Yes No  
Sig: Take 40 mg by mouth nightly. cholecalciferol, vitamin D3, 2,000 unit tab   Yes No  
Sig: Take 2,000 Units by mouth every morning. cyanocobalamin (VITAMIN B-12) 1,000 mcg sublingual tablet   Yes No  
Sig: Take 1,000 mcg by mouth daily. hydrochlorothiazide (HYDRODIURIL) 25 mg tablet   Yes No  
Sig: Take 25 mg by mouth every morning. losartan (COZAAR) 100 mg tablet   Yes No  
Sig: Take 100 mg by mouth every morning. pregabalin (LYRICA) 150 mg capsule   Yes No  
Sig: Take 150 mg by mouth two (2) times a day. Facility-Administered Medications: None Adult Exam  
General: alert, no acute distress Head: normocephalic, atraumatic ENT: moist mucous membranes Neck: supple, non-tender; full range of motion Cardiovascular: regular rate and rhythm, normal peripheral perfusion, no edema. Equal radial pulses Respiratory:  normal respirations; no wheezing, rales or rhonchi Mild cough during exam 
Gastrointestinal: soft, non-tender; no rebound or guarding, no peritoneal signs, no distension Back: non-tender, full range of motion Musculoskeletal: normal range of motion, normal strength, no gross deformities Neurological: alert and oriented x 4, no gross focal deficits; normal speech Psychiatric: cooperative; appropriate mood and affect MDM: 
Pain improved with nitro. Blood pressure Not elevated at this time. Lungs are clear. We will obtain chest x-ray. We will check troponin for abnormalities. Multiple risk factor given the obesity, blood pressure, cholesterol. He is also hypoxic. Need to consider pulmonary embolism. We will check d-dimer since he does not have hemoptysis, pleuritic pain in nature. 11:58 AM 
 
EKG rate of 60. Appears sinus to me. Normal axis and interval.  No STEMI ischemic changes noted. PVC noted. Troponin negative. D-dimer negative. Chest x-ray without consolidation. BNP less than 100. No history of COPD. Not on oxygen at home. Hypoxic. Chest pain improved. Recommend admission to hospitalist at this time. Low suspicion for acute ACS, pneumonia, pneumothorax, dissection. Blood pressure is stable at this time. Recent Results (from the past 12 hour(s)) EKG, 12 LEAD, INITIAL Collection Time: 03/28/19 10:28 AM  
Result Value Ref Range Ventricular Rate 60 BPM  
 Atrial Rate 326 BPM  
 QRS Duration 88 ms Q-T Interval 436 ms  
 QTC Calculation (Bezet) 436 ms Calculated R Axis 49 degrees Calculated T Axis 85 degrees Diagnosis    
  !! AGE AND GENDER SPECIFIC ECG ANALYSIS !! 
Junctional rhythm with occasional Premature ventricular complexes Abnormal ECG When compared with ECG of 13-APR-2010 10:31, 
Junctional rhythm has replaced Sinus rhythm CBC WITH AUTOMATED DIFF Collection Time: 03/28/19 10:42 AM  
Result Value Ref Range WBC 6.0 4.3 - 11.1 K/uL  
 RBC 4.64 4.23 - 5.6 M/uL  
 HGB 15.5 13.6 - 17.2 g/dL HCT 46.2 41.1 - 50.3 % MCV 99.6 (H) 79.6 - 97.8 FL  
 MCH 33.4 (H) 26.1 - 32.9 PG  
 MCHC 33.5 31.4 - 35.0 g/dL  
 RDW 12.2 11.9 - 14.6 % PLATELET 744 608 - 535 K/uL MPV 9.8 9.4 - 12.3 FL ABSOLUTE NRBC 0.00 0.0 - 0.2 K/uL  
 DF AUTOMATED NEUTROPHILS 62 43 - 78 % LYMPHOCYTES 26 13 - 44 % MONOCYTES 7 4.0 - 12.0 % EOSINOPHILS 3 0.5 - 7.8 % BASOPHILS 1 0.0 - 2.0 % IMMATURE GRANULOCYTES 1 0.0 - 5.0 %  
 ABS. NEUTROPHILS 3.8 1.7 - 8.2 K/UL  
 ABS. LYMPHOCYTES 1.6 0.5 - 4.6 K/UL  
 ABS. MONOCYTES 0.4 0.1 - 1.3 K/UL  
 ABS. EOSINOPHILS 0.2 0.0 - 0.8 K/UL  
 ABS. BASOPHILS 0.0 0.0 - 0.2 K/UL  
 ABS. IMM. GRANS. 0.0 0.0 - 0.5 K/UL METABOLIC PANEL, COMPREHENSIVE Collection Time: 03/28/19 10:42 AM  
Result Value Ref Range Sodium 141 136 - 145 mmol/L Potassium 3.8 3.5 - 5.1 mmol/L Chloride 101 98 - 107 mmol/L  
 CO2 36 (H) 21 - 32 mmol/L Anion gap 4 (L) 7 - 16 mmol/L Glucose 145 (H) 65 - 100 mg/dL BUN 12 8 - 23 MG/DL Creatinine 0.82 0.8 - 1.5 MG/DL  
 GFR est AA >60 >60 ml/min/1.73m2 GFR est non-AA >60 >60 ml/min/1.73m2 Calcium 9.0 8.3 - 10.4 MG/DL Bilirubin, total 0.4 0.2 - 1.1 MG/DL  
 ALT (SGPT) 34 12 - 65 U/L  
 AST (SGOT) 14 (L) 15 - 37 U/L Alk. phosphatase 107 50 - 136 U/L Protein, total 7.3 6.3 - 8.2 g/dL Albumin 3.7 3.2 - 4.6 g/dL Globulin 3.6 (H) 2.3 - 3.5 g/dL A-G Ratio 1.0 (L) 1.2 - 3.5 LIPASE Collection Time: 03/28/19 10:42 AM  
Result Value Ref Range Lipase 130 73 - 393 U/L  
BNP Collection Time: 03/28/19 10:42 AM  
Result Value Ref Range BNP 40 (H) 0 pg/mL D DIMER Collection Time: 03/28/19 10:42 AM  
Result Value Ref Range D DIMER 0.40 <0.56 ug/ml(FEU) POC TROPONIN-I Collection Time: 03/28/19 10:45 AM  
Result Value Ref Range Troponin-I (POC) 0 (L) 0.02 - 0.05 ng/ml Xr Chest Pa Lat Result Date: 3/28/2019 EXAMINATION: CHEST RADIOGRAPH 3/28/2019 10:49 AM ACCESSION NUMBER: [id number] INDICATION: chest pain COMPARISON: Chest x-ray 2/22/2007 TECHNIQUE: PA and lateral views of the chest were obtained. FINDINGS: Cardiac Silhouette: There is unchanged mild enlargement of the cardiac silhouette.  Lungs: Minimal linear atelectasis in the periphery of the left lung base. No evidence of a focal pneumonia. Pleura: No pleural effusion. No pneumothorax. Slight accentuation of previously seen right apical pleural thickening. Osseous Structures: Thoracic spine spondylosis. Upper Abdomen: Unremarkable. IMPRESSION: 1. There is unchanged mild enlargement of the cardiac silhouette. 2. No evidence of a focal pneumonia. VOICE DICTATED BY: Dr. Abbey Grijalva Dragon voice recognition software was used to create this note. Although the note has been reviewed and corrected where necessary, additional errors may have been overlooked and remain in the text.

## 2019-03-28 NOTE — H&P
Hospitalist H&P Note Admit Date:  3/28/2019 10:18 AM  
Name:  Roberto Avitia Age:  68 y.o. 
:  1945 MRN:  [id number] PCP:  Jovana Harris MD 
Treatment Team: Attending Provider: Chetna Sánchez MD; Primary Nurse: Deb Davis RN 
 
HPI/Subjective:  
Pt is a 69 y/o M with morbid obesity, YAHAIRA compliant with CPAP, HTN, seizures, who presented to ER with chest pain. Onset this morning after waking up, constant. Substernal, dull/pressure like, moderate. Made better by nitro/ASA given by EMS. A/w SOB. He denies any cough to me; says he has occasional chronic nonproductive cough; wife also says no acute cough. Reports house recently had some water damage to floors and the floors have been ripped up. There was some concern of mold and asbestos so these were sent off by [employer] for testing. Pt has had some exposure and may have inhaled some dust.  Wife has not had any symptoms. Has home oximeter and says oxygen has been lower than usual.  Poor functional status at baseline with walker but has had more STOCKTON than usual.  Currently no CP. Mild SOB. Hypoxic to 87% on RA at rest so oxygen NC placed. Denies fevers, chills, HA. Reports chronic rhinorrhea unchanged. Denies allergies, watery/irritated eyes, sinus congestion, recent colds or other illnesses. 10 systems reviewed and negative except as noted in HPI. Past Medical History:  
Diagnosis Date  Anxiety   
 daily meds  Cancer ([address]   
 prostate-surg only  Chronic pain   
 all over  Hyperlipidemia   
 treats with medication  Hypertension   
 controlled with meds  Morbid obesity ([address] BMI 40.6  Osteoarthritis   
 generalized  Personal history of prostate cancer  Seizures ([address] first one 29's  
 last one age 43, controlled with meds  Sleep apnea   
 uses CPAP  Thromboembolus ([address] ~  
 after sx, left lower extremity Past Surgical History:  
Procedure Laterality Date  HX BLADDER SUSPENSION    
 HX CATARACT REMOVAL Bilateral   
 HX KNEE ARTHROSCOPY    
 on right in 2005 and left in 2006  HX KNEE REPLACEMENT    
 to L  
 HX ORTHOPAEDIC    
 ankle left x2  
 HX OTHER SURGICAL    
 skin cancer removed from top of head  HX PROSTATECTOMY  12/2010 Allergies Allergen Reactions  Adhesive Rash  Bactrim [Sulfamethoxazole-Trimethoprim] Hives Social History Tobacco Use  Smoking status: Never Smoker  Smokeless tobacco: Never Used Substance Use Topics  Alcohol use: No  
  
Family History Problem Relation Age of Onset  Diabetes Father  Heart Disease Father  Stroke Mother  Hypertension Mother  Hypertension Sister  Heart Disease Sister CHF  Malignant Hyperthermia Neg Hx  Pseudocholinesterase Deficiency Neg Hx  Delayed Awakening Neg Hx  Post-op Nausea/Vomiting Neg Hx  Emergence Delirium Neg Hx  Post-op Cognitive Dysfunction Neg Hx   
 Other Neg Hx Immunization History Administered Date(s) Administered  Influenza Vaccine 09/13/2016  TB Skin Test (PPD) Intradermal 03/12/2015, 01/26/2017 PTA Medications: 
Prior to Admission Medications Prescriptions Last Dose Informant Patient Reported? Taking? MULTI-VITAMIN PO   Yes No  
Sig: Take 1 Tab by mouth daily. PHENobarbital (LUMINAL) 97.2 mg tablet   No No  
Sig: Take 1 Tab by mouth nightly. Max Daily Amount: 97.2 mg. amLODIPine (NORVASC) 5 mg tablet   Yes No  
Sig: Take 5 mg by mouth daily. aspirin 81 mg chewable tablet   Yes No  
Sig: Take 81 mg by mouth nightly. Indications: prevention of cerebrovascular accident  
atorvastatin (LIPITOR) 40 mg tablet   Yes No  
Sig: Take 40 mg by mouth nightly. cholecalciferol, vitamin D3, 2,000 unit tab   Yes No  
Sig: Take 2,000 Units by mouth every morning. cyanocobalamin (VITAMIN B-12) 1,000 mcg sublingual tablet   Yes No  
Sig: Take 1,000 mcg by mouth daily. hydrochlorothiazide (HYDRODIURIL) 25 mg tablet   Yes No  
Sig: Take 25 mg by mouth every morning. losartan (COZAAR) 100 mg tablet   Yes No  
Sig: Take 100 mg by mouth every morning. pregabalin (LYRICA) 150 mg capsule   Yes No  
Sig: Take 150 mg by mouth two (2) times a day. Facility-Administered Medications: None Objective:  
 
Patient Vitals for the past 24 hrs: 
 Temp Pulse BP SpO2  
03/28/19 1020 98.2 °F (36.8 °C) 64 125/60 (!) 87 % Oxygen Therapy O2 Sat (%): (!) 87 % (03/28/19 1020) Pulse via Oximetry: 56 beats per minute (03/28/19 1020) O2 Device: Room air (03/28/19 1020) No intake or output data in the 24 hours ending 03/28/19 1402 *Note that automatically entered I/Os may not be accurate; dependent on patient compliance with collection and accurate  by assistants. Physical Exam: 
General:    Well nourished. Alert. obese Eyes:   Normal sclera. Extraocular movements intact. HENT:  Normocephalic, atraumatic. Moist mucous membranes CV:   RRR. No m/r/g. Lungs:  CTAB. No wheezing, rhonchi, or rales. Abdomen: Soft, nontender, nondistended. Extremities: Warm and dry. No cyanosis or edema. Neurologic: CN II-XII grossly intact. Sensation intact. Skin:     No rashes or jaundice. Normal coloration Psych:  Normal mood and affect. I reviewed the labs, imaging, EKGs, telemetry, and other studies done this admission. Data Review:  
Recent Results (from the past 24 hour(s)) EKG, 12 LEAD, INITIAL Collection Time: 03/28/19 10:28 AM  
Result Value Ref Range Ventricular Rate 60 BPM  
 Atrial Rate 326 BPM  
 QRS Duration 88 ms Q-T Interval 436 ms  
 QTC Calculation (Bezet) 436 ms Calculated R Axis 49 degrees Calculated T Axis 85 degrees Diagnosis    
  !! AGE AND GENDER SPECIFIC ECG ANALYSIS !! Normal sinus rhythm bradycardia  
with occasional Premature ventricular complexes Abnormal ECG When compared with ECG of 13-APR-2010 10:31, 
 
 Confirmed by SADE MCCOY (), Jodi Leung ([id number]) on 3/28/2019 1:59:50 PM 
  
CBC WITH AUTOMATED DIFF Collection Time: 03/28/19 10:42 AM  
Result Value Ref Range WBC 6.0 4.3 - 11.1 K/uL  
 RBC 4.64 4.23 - 5.6 M/uL  
 HGB 15.5 13.6 - 17.2 g/dL HCT 46.2 41.1 - 50.3 % MCV 99.6 (H) 79.6 - 97.8 FL  
 MCH 33.4 (H) 26.1 - 32.9 PG  
 MCHC 33.5 31.4 - 35.0 g/dL  
 RDW 12.2 11.9 - 14.6 % PLATELET 985 630 - 390 K/uL MPV 9.8 9.4 - 12.3 FL ABSOLUTE NRBC 0.00 0.0 - 0.2 K/uL  
 DF AUTOMATED NEUTROPHILS 62 43 - 78 % LYMPHOCYTES 26 13 - 44 % MONOCYTES 7 4.0 - 12.0 % EOSINOPHILS 3 0.5 - 7.8 % BASOPHILS 1 0.0 - 2.0 % IMMATURE GRANULOCYTES 1 0.0 - 5.0 %  
 ABS. NEUTROPHILS 3.8 1.7 - 8.2 K/UL  
 ABS. LYMPHOCYTES 1.6 0.5 - 4.6 K/UL  
 ABS. MONOCYTES 0.4 0.1 - 1.3 K/UL  
 ABS. EOSINOPHILS 0.2 0.0 - 0.8 K/UL  
 ABS. BASOPHILS 0.0 0.0 - 0.2 K/UL  
 ABS. IMM. GRANS. 0.0 0.0 - 0.5 K/UL METABOLIC PANEL, COMPREHENSIVE Collection Time: 03/28/19 10:42 AM  
Result Value Ref Range Sodium 141 136 - 145 mmol/L Potassium 3.8 3.5 - 5.1 mmol/L Chloride 101 98 - 107 mmol/L  
 CO2 36 (H) 21 - 32 mmol/L Anion gap 4 (L) 7 - 16 mmol/L Glucose 145 (H) 65 - 100 mg/dL BUN 12 8 - 23 MG/DL Creatinine 0.82 0.8 - 1.5 MG/DL  
 GFR est AA >60 >60 ml/min/1.73m2 GFR est non-AA >60 >60 ml/min/1.73m2 Calcium 9.0 8.3 - 10.4 MG/DL Bilirubin, total 0.4 0.2 - 1.1 MG/DL  
 ALT (SGPT) 34 12 - 65 U/L  
 AST (SGOT) 14 (L) 15 - 37 U/L Alk. phosphatase 107 50 - 136 U/L Protein, total 7.3 6.3 - 8.2 g/dL Albumin 3.7 3.2 - 4.6 g/dL Globulin 3.6 (H) 2.3 - 3.5 g/dL A-G Ratio 1.0 (L) 1.2 - 3.5 LIPASE Collection Time: 03/28/19 10:42 AM  
Result Value Ref Range Lipase 130 73 - 393 U/L  
BNP Collection Time: 03/28/19 10:42 AM  
Result Value Ref Range BNP 40 (H) 0 pg/mL D DIMER Collection Time: 03/28/19 10:42 AM  
Result Value Ref Range D DIMER 0.40 <0.56 ug/ml(FEU) POC TROPONIN-I  
 Collection Time: 03/28/19 10:45 AM  
Result Value Ref Range Troponin-I (POC) 0 (L) 0.02 - 0.05 ng/ml All Micro Results None Current Facility-Administered Medications Medication Dose Route Frequency  albuterol (PROVENTIL VENTOLIN) nebulizer solution 2.5 mg  2.5 mg Nebulization Q6H RT  
 
Current Outpatient Medications Medication Sig  
 amLODIPine (NORVASC) 5 mg tablet Take 5 mg by mouth daily.  pregabalin (LYRICA) 150 mg capsule Take 150 mg by mouth two (2) times a day.  PHENobarbital (LUMINAL) 97.2 mg tablet Take 1 Tab by mouth nightly. Max Daily Amount: 97.2 mg.  
 atorvastatin (LIPITOR) 40 mg tablet Take 40 mg by mouth nightly.  cholecalciferol, vitamin D3, 2,000 unit tab Take 2,000 Units by mouth every morning.  hydrochlorothiazide (HYDRODIURIL) 25 mg tablet Take 25 mg by mouth every morning.  cyanocobalamin (VITAMIN B-12) 1,000 mcg sublingual tablet Take 1,000 mcg by mouth daily.  losartan (COZAAR) 100 mg tablet Take 100 mg by mouth every morning.  aspirin 81 mg chewable tablet Take 81 mg by mouth nightly. Indications: prevention of cerebrovascular accident  MULTI-VITAMIN PO Take 1 Tab by mouth daily. Other Studies: Xr Chest Pa Lat Result Date: 3/28/2019 EXAMINATION: CHEST RADIOGRAPH 3/28/2019 10:49 AM ACCESSION NUMBER: [id number] INDICATION: chest pain COMPARISON: Chest x-ray 2/22/2007 TECHNIQUE: PA and lateral views of the chest were obtained. FINDINGS: Cardiac Silhouette: There is unchanged mild enlargement of the cardiac silhouette. Lungs: Minimal linear atelectasis in the periphery of the left lung base. No evidence of a focal pneumonia. Pleura: No pleural effusion. No pneumothorax. Slight accentuation of previously seen right apical pleural thickening. Osseous Structures: Thoracic spine spondylosis. Upper Abdomen: Unremarkable. IMPRESSION: 1.  There is unchanged mild enlargement of the cardiac silhouette. 2. No evidence of a focal pneumonia. VOICE DICTATED BY: Dr. Dawna Dumont Assessment and Plan:  
 
Hospital Problems as of 3/28/2019 Date Reviewed: 3/26/2019 Codes Class Noted - Resolved POA * (Principal) Chest pain ICD-10-CM: R07.9 ICD-9-CM: 786.50  3/28/2019 - Present Yes Acute pneumonitis ICD-10-CM: J18.9 ICD-9-CM: 029  3/28/2019 - Present Yes HLD (hyperlipidemia) (Chronic) ICD-10-CM: Y07.0 ICD-9-CM: 272.4  3/28/2019 - Present Yes Morbid obesity ([facility] (Chronic) ICD-10-CM: E66.01 
ICD-9-CM: 278.01  3/28/2019 - Present Yes  
   
 YAHAIRA (obstructive sleep apnea) (Chronic) ICD-10-CM: V43.54 
ICD-9-CM: 327.23  3/27/2019 - Present Yes Hypertension (Chronic) ICD-10-CM: I10 
ICD-9-CM: 401.9  5/6/2010 - Present Yes Seizure disorder ([facility] (Chronic) ICD-10-CM: G40.909 ICD-9-CM: 345.90  5/6/2010 - Present Yes Plan: · Observation · Suspect some form of organic or inorganic dust pneumonitis, mild. Exertional hypoxia at home could have induced supply-demand mismatch and chest pain. I suspect he has CAD. Denies ever seeing a cardiologist 
· Trend trops · Check CT chest for occult findings that would be missed on CXR. D dimer negative effectively ruling out PE · Albuterol nebs. I do not think he needs systemic or inhaled steroids; lung sounds clear · Avoidance of the dust due to his home remodel has been stressed; or at a minimum wearing high quality respiratory mask if exposure unavoidable · I suggested that he see a cardiologist for stress testing or heart cath after feeling back to baseline · Cont CPAP here · Cont home HTN and seizure meds Discharge planning:  Home tomorrow likely DVT ppx: lovenox Code status:  Full Risk:  High Expected LOS:  2-3 days Signed: 
Иван Trinh MD

## 2019-03-29 PROBLEM — I25.119 CORONARY ARTERY DISEASE INVOLVING NATIVE CORONARY ARTERY WITH ANGINA PECTORIS (HCC): Status: ACTIVE | Noted: 2019-03-29

## 2019-03-29 LAB
ANION GAP SERPL CALC-SCNC: 5 MMOL/L (ref 7–16)
BUN SERPL-MCNC: 12 MG/DL (ref 8–23)
CALCIUM SERPL-MCNC: 8.3 MG/DL (ref 8.3–10.4)
CHLORIDE SERPL-SCNC: 105 MMOL/L (ref 98–107)
CO2 SERPL-SCNC: 33 MMOL/L (ref 21–32)
CREAT SERPL-MCNC: 0.58 MG/DL (ref 0.8–1.5)
GLUCOSE SERPL-MCNC: 116 MG/DL (ref 65–100)
POTASSIUM SERPL-SCNC: 3.3 MMOL/L (ref 3.5–5.1)
SODIUM SERPL-SCNC: 143 MMOL/L (ref 136–145)

## 2019-03-29 PROCEDURE — 74011250636 HC RX REV CODE- 250/636

## 2019-03-29 PROCEDURE — 74011250636 HC RX REV CODE- 250/636: Performed by: INTERNAL MEDICINE

## 2019-03-29 PROCEDURE — 94640 AIRWAY INHALATION TREATMENT: CPT

## 2019-03-29 PROCEDURE — 77030004559 HC CATH ANGI DX SUPT CARD -B

## 2019-03-29 PROCEDURE — 74011000250 HC RX REV CODE- 250: Performed by: INTERNAL MEDICINE

## 2019-03-29 PROCEDURE — 74011250637 HC RX REV CODE- 250/637: Performed by: INTERNAL MEDICINE

## 2019-03-29 PROCEDURE — 77030020263 HC SOL INJ SOD CL0.9% LFCR 1000ML

## 2019-03-29 PROCEDURE — 77010033678 HC OXYGEN DAILY

## 2019-03-29 PROCEDURE — C1894 INTRO/SHEATH, NON-LASER: HCPCS

## 2019-03-29 PROCEDURE — 77030019569 HC BND COMPR RAD TERU -B

## 2019-03-29 PROCEDURE — 93458 L HRT ARTERY/VENTRICLE ANGIO: CPT

## 2019-03-29 PROCEDURE — 74011250636 HC RX REV CODE- 250/636: Performed by: PHYSICIAN ASSISTANT

## 2019-03-29 PROCEDURE — B2111ZZ FLUOROSCOPY OF MULTIPLE CORONARY ARTERIES USING LOW OSMOLAR CONTRAST: ICD-10-PCS | Performed by: INTERNAL MEDICINE

## 2019-03-29 PROCEDURE — C1887 CATHETER, GUIDING: HCPCS

## 2019-03-29 PROCEDURE — 74011636320 HC RX REV CODE- 636/320: Performed by: INTERNAL MEDICINE

## 2019-03-29 PROCEDURE — C1769 GUIDE WIRE: HCPCS

## 2019-03-29 PROCEDURE — 80048 BASIC METABOLIC PNL TOTAL CA: CPT

## 2019-03-29 PROCEDURE — 36415 COLL VENOUS BLD VENIPUNCTURE: CPT

## 2019-03-29 PROCEDURE — 4A023N7 MEASUREMENT OF CARDIAC SAMPLING AND PRESSURE, LEFT HEART, PERCUTANEOUS APPROACH: ICD-10-PCS | Performed by: INTERNAL MEDICINE

## 2019-03-29 PROCEDURE — 99218 HC RM OBSERVATION: CPT

## 2019-03-29 PROCEDURE — 99152 MOD SED SAME PHYS/QHP 5/>YRS: CPT

## 2019-03-29 PROCEDURE — B2151ZZ FLUOROSCOPY OF LEFT HEART USING LOW OSMOLAR CONTRAST: ICD-10-PCS | Performed by: INTERNAL MEDICINE

## 2019-03-29 PROCEDURE — 94760 N-INVAS EAR/PLS OXIMETRY 1: CPT

## 2019-03-29 RX ORDER — HEPARIN SODIUM 200 [USP'U]/100ML
2 INJECTION, SOLUTION INTRAVENOUS CONTINUOUS
Status: DISCONTINUED | OUTPATIENT
Start: 2019-03-29 | End: 2019-03-31 | Stop reason: HOSPADM

## 2019-03-29 RX ORDER — POTASSIUM CHLORIDE 20 MEQ/1
40 TABLET, EXTENDED RELEASE ORAL
Status: COMPLETED | OUTPATIENT
Start: 2019-03-29 | End: 2019-03-29

## 2019-03-29 RX ORDER — ENOXAPARIN SODIUM 100 MG/ML
40 INJECTION SUBCUTANEOUS EVERY 12 HOURS
Status: DISCONTINUED | OUTPATIENT
Start: 2019-03-29 | End: 2019-03-31 | Stop reason: HOSPADM

## 2019-03-29 RX ORDER — LIDOCAINE HYDROCHLORIDE 10 MG/ML
3-10 INJECTION INFILTRATION; PERINEURAL
Status: DISCONTINUED | OUTPATIENT
Start: 2019-03-29 | End: 2019-03-31 | Stop reason: HOSPADM

## 2019-03-29 RX ORDER — MIDAZOLAM HYDROCHLORIDE 1 MG/ML
.5-2 INJECTION, SOLUTION INTRAMUSCULAR; INTRAVENOUS
Status: DISCONTINUED | OUTPATIENT
Start: 2019-03-29 | End: 2019-03-31 | Stop reason: HOSPADM

## 2019-03-29 RX ORDER — GUAIFENESIN 100 MG/5ML
81 LIQUID (ML) ORAL ONCE
Status: COMPLETED | OUTPATIENT
Start: 2019-03-29 | End: 2019-03-29

## 2019-03-29 RX ADMIN — PREGABALIN 150 MG: 150 CAPSULE ORAL at 09:04

## 2019-03-29 RX ADMIN — IOPAMIDOL 80 ML: 755 INJECTION, SOLUTION INTRAVENOUS at 18:14

## 2019-03-29 RX ADMIN — Medication 10 ML: at 05:30

## 2019-03-29 RX ADMIN — ATORVASTATIN CALCIUM 40 MG: 40 TABLET, FILM COATED ORAL at 21:03

## 2019-03-29 RX ADMIN — ASPIRIN 81 MG 81 MG: 81 TABLET ORAL at 11:36

## 2019-03-29 RX ADMIN — POTASSIUM CHLORIDE 40 MEQ: 20 TABLET, EXTENDED RELEASE ORAL at 09:06

## 2019-03-29 RX ADMIN — ASPIRIN 81 MG 81 MG: 81 TABLET ORAL at 21:03

## 2019-03-29 RX ADMIN — ALBUTEROL SULFATE 2.5 MG: 2.5 SOLUTION RESPIRATORY (INHALATION) at 14:00

## 2019-03-29 RX ADMIN — ALBUTEROL SULFATE 2.5 MG: 2.5 SOLUTION RESPIRATORY (INHALATION) at 07:16

## 2019-03-29 RX ADMIN — Medication 5 ML: at 16:30

## 2019-03-29 RX ADMIN — SODIUM CHLORIDE 75 ML/HR: 900 INJECTION, SOLUTION INTRAVENOUS at 04:40

## 2019-03-29 RX ADMIN — HEPARIN SODIUM 2 ML: 10000 INJECTION, SOLUTION INTRAVENOUS; SUBCUTANEOUS at 17:59

## 2019-03-29 RX ADMIN — LOSARTAN POTASSIUM 100 MG: 50 TABLET ORAL at 09:04

## 2019-03-29 RX ADMIN — VITAMIN D, TAB 1000IU (100/BT) 2000 UNITS: 25 TAB at 09:03

## 2019-03-29 RX ADMIN — Medication 5 ML: at 21:04

## 2019-03-29 RX ADMIN — MIDAZOLAM HYDROCHLORIDE 2 MG: 1 INJECTION, SOLUTION INTRAMUSCULAR; INTRAVENOUS at 17:57

## 2019-03-29 RX ADMIN — AMLODIPINE BESYLATE 5 MG: 5 TABLET ORAL at 09:03

## 2019-03-29 RX ADMIN — CYANOCOBALAMIN TAB 1000 MCG 1000 MCG: 1000 TAB at 09:04

## 2019-03-29 RX ADMIN — PHENOBARBITAL 97.2 MG: 97.2 TABLET ORAL at 21:12

## 2019-03-29 RX ADMIN — LIDOCAINE HYDROCHLORIDE 3 ML: 10 INJECTION, SOLUTION INFILTRATION; PERINEURAL at 17:59

## 2019-03-29 RX ADMIN — HEPARIN SODIUM 2 UNITS/HR: 5000 INJECTION, SOLUTION INTRAVENOUS; SUBCUTANEOUS at 17:55

## 2019-03-29 RX ADMIN — PREGABALIN 150 MG: 150 CAPSULE ORAL at 21:12

## 2019-03-29 NOTE — PROGRESS NOTES
TRANSFER - OUT REPORT: 
 
[facility] Dr Lynda Arthur Diagnostic clean cath Versed 2 mg 
TR band 14 ml No bleeding/hematoma Pt is a/o denies complaints Verbal report given to Dacia(name) on Heather Claudio  being transferred to CPRU(unit) for routine progression of care Report consisted of patients Situation, Background, Assessment and  
Recommendations(SBAR). Information from the following report(s) SBAR and Procedure Summary was reviewed with the receiving nurse. Lines:  
Peripheral IV Left Antecubital (Active) Site Assessment Clean, dry, & intact 3/29/2019  7:24 AM  
Phlebitis Assessment 0 3/29/2019  7:24 AM  
Infiltration Assessment 0 3/29/2019  7:24 AM  
Dressing Status Clean, dry, & intact 3/29/2019  7:24 AM  
Dressing Type Tape;Transparent 3/29/2019  7:24 AM  
Hub Color/Line Status Patent 3/29/2019  7:24 AM  
Alcohol Cap Used No 3/29/2019  5:30 AM  
  
 
Opportunity for questions and clarification was provided.

## 2019-03-29 NOTE — PROGRESS NOTES
TRANSFER - OUT REPORT: 
 
Verbal report given to [facility] RN(name) on [address]  being returned to room 803(unit) for routine progression of care Report consisted of patients Situation, Background, Assessment and  
Recommendations(SBAR). Information from the following report(s) Procedure Summary was reviewed with the receiving nurse. Lines:  
Peripheral IV Left Antecubital (Active) Site Assessment Clean, dry, & intact 3/29/2019  7:24 AM  
Phlebitis Assessment 0 3/29/2019  7:24 AM  
Infiltration Assessment 0 3/29/2019  7:24 AM  
Dressing Status Clean, dry, & intact 3/29/2019  7:24 AM  
Dressing Type Tape;Transparent 3/29/2019  7:24 AM  
Hub Color/Line Status Patent 3/29/2019  7:24 AM  
Alcohol Cap Used No 3/29/2019  5:30 AM  
  
 
Opportunity for questions and clarification was provided. Patient transported with: 
 Hospital transport

## 2019-03-29 NOTE — PROGRESS NOTES
Patient voices no concerns at this time. Patient is resting in bed with wife at bedside awaiting heart cath procedure. Call light within reach and patient instructed to call if assistance is needed. Will continue to monitor.

## 2019-03-29 NOTE — PROGRESS NOTES
Report received from [facility] RN. Procedural findings communicated. Intra procedural  medication administration reviewed. Progression of care discussed. Patient received into [address] post sheath removal.  
 
right Radial access site without bleeding or swelling TR band dry and intact Patient instructed to limit movement to right upper extremity Routine post procedural vital signs and site assessment initiated

## 2019-03-29 NOTE — INTERDISCIPLINARY ROUNDS
Interdisciplinary team rounds were held 3/29/2019 with the following team members:Care Management, Physical Therapy, Physician and Clinical Coordinator and the patient. Plan of care discussed. See clinical pathway and/or care plan for interventions and desired outcomes.

## 2019-03-29 NOTE — PROGRESS NOTES
Bedside shift report completed with oncoming nurse, Christiane Elmore. Patient resting quietly in bed at this time, eyes closed, respirations even and unlabored. No needs stated. Bed low and locked. Bedside table, personal belongings and call light within reach. Wife at bedside.

## 2019-03-29 NOTE — PROGRESS NOTES
Patient resting in bed with no complaints at this time. Patient is alert and orientated with no distress noted. IV intact and patent with no s/s of infection noted. Respirations even and unlabored with heart rate regular. Patient able to ambulate independently without assistance. Bed in low locked position with call light within reach. Patient instructed to call if assistance is needed. Will continue to monitor. Wife at bedside waiting heart cath. Consent signed and in chart. Patient has been NPO since MN.

## 2019-03-29 NOTE — PROGRESS NOTES
Hospitalist H&P Note Admit Date:  3/28/2019 10:18 AM  
Name:  Tabitha Green Age:  68 y.o. 
:  1945 MRN:  [id number] PCP:  Jonh Lyles MD 
Treatment Team: Attending Provider: Alfredo Stevens MD; Consulting Provider: Mani Navarro MD; Physician: Mani Navarro MD; Utilization Review: Guilhermealanis Castillo 
 
HPI/Subjective:  
Pt is a 69 y/o M with morbid obesity, YAHAIRA compliant with CPAP, HTN, seizures, who presented to ER with chest pain. Onset this morning after waking up, constant. Substernal, dull/pressure like, moderate. Made better by nitro/ASA given by EMS. A/w SOB. Also hypoxia. Some history of renovation  Related dust exposure in house. CT chest showed CAD. No previous CAD dx or even cardiology visits. Cardiology consulted for [facility]. 3/29 - pt still mildly SOB. No CP, cough currently. NPO for C today Objective:  
 
Patient Vitals for the past 24 hrs: 
 Temp Pulse Resp BP SpO2  
19 0718     92 % 19 0717 97.6 °F (36.4 °C) 61 18 163/74 94 % 19 0336 98.2 °F (36.8 °C) (!) 59 18 138/78 91 %  
19 0238  (!) 57     
19 2323 98.2 °F (36.8 °C) 60 18 137/82 97 % 19 1915     93 % 19 1907 98 °F (36.7 °C) 92 18 152/76 93 % 19 1517 97.7 °F (36.5 °C) (!) 57 17 136/76 97 % 19 1500  (!) 53   94 % 19 1436  (!) 52   93 % 19 1435   16    
19 1411     96 % 19 1410     90 % 19 1222  (!) 51   95 % 19 1120  73  124/57 92 % 19 1020 98.2 °F (36.8 °C) 64  125/60 (!) 87 % Oxygen Therapy O2 Sat (%): 92 % (19) Pulse via Oximetry: 62 beats per minute (19) O2 Device: Nasal cannula (19) O2 Flow Rate (L/min): 2 l/min (19) Intake/Output Summary (Last 24 hours) at 3/29/2019 0557 Last data filed at 3/29/2019 5630 Gross per 24 hour Intake 600 ml Output  Net 600 ml *Note that automatically entered I/Os may not be accurate; dependent on patient compliance with collection and accurate  by assistants. Physical Exam: 
General:    Well nourished. Alert. obese CV:   RRR. No m/r/g. Lungs:  CTAB. No wheezing, rhonchi, or rales. Extremities: Warm and dry. No cyanosis or edema. Skin:     No rashes or jaundice. Normal coloration Psych:  Normal mood and affect. I reviewed the labs, imaging, EKGs, telemetry, and other studies done this admission. Data Review:  
Recent Results (from the past 24 hour(s)) EKG, 12 LEAD, INITIAL Collection Time: 03/28/19 10:28 AM  
Result Value Ref Range Ventricular Rate 60 BPM  
 Atrial Rate 326 BPM  
 QRS Duration 88 ms Q-T Interval 436 ms  
 QTC Calculation (Bezet) 436 ms Calculated R Axis 49 degrees Calculated T Axis 85 degrees Diagnosis    
  !! AGE AND GENDER SPECIFIC ECG ANALYSIS !! Normal sinus rhythm bradycardia  
with occasional Premature ventricular complexes Abnormal ECG When compared with ECG of 13-APR-2010 10:31, 
 
Confirmed by SADE MCCOY (), [place] Monday ([id number]) on 3/28/2019 1:59:50 PM 
  
CBC WITH AUTOMATED DIFF Collection Time: 03/28/19 10:42 AM  
Result Value Ref Range WBC 6.0 4.3 - 11.1 K/uL  
 RBC 4.64 4.23 - 5.6 M/uL  
 HGB 15.5 13.6 - 17.2 g/dL HCT 46.2 41.1 - 50.3 % MCV 99.6 (H) 79.6 - 97.8 FL  
 MCH 33.4 (H) 26.1 - 32.9 PG  
 MCHC 33.5 31.4 - 35.0 g/dL  
 RDW 12.2 11.9 - 14.6 % PLATELET 717 157 - 563 K/uL MPV 9.8 9.4 - 12.3 FL ABSOLUTE NRBC 0.00 0.0 - 0.2 K/uL  
 DF AUTOMATED NEUTROPHILS 62 43 - 78 % LYMPHOCYTES 26 13 - 44 % MONOCYTES 7 4.0 - 12.0 % EOSINOPHILS 3 0.5 - 7.8 % BASOPHILS 1 0.0 - 2.0 % IMMATURE GRANULOCYTES 1 0.0 - 5.0 %  
 ABS. NEUTROPHILS 3.8 1.7 - 8.2 K/UL  
 ABS. LYMPHOCYTES 1.6 0.5 - 4.6 K/UL  
 ABS. MONOCYTES 0.4 0.1 - 1.3 K/UL  
 ABS. EOSINOPHILS 0.2 0.0 - 0.8 K/UL  
 ABS. BASOPHILS 0.0 0.0 - 0.2 K/UL ABS. IMM. GRANS. 0.0 0.0 - 0.5 K/UL METABOLIC PANEL, COMPREHENSIVE Collection Time: 03/28/19 10:42 AM  
Result Value Ref Range Sodium 141 136 - 145 mmol/L Potassium 3.8 3.5 - 5.1 mmol/L Chloride 101 98 - 107 mmol/L  
 CO2 36 (H) 21 - 32 mmol/L Anion gap 4 (L) 7 - 16 mmol/L Glucose 145 (H) 65 - 100 mg/dL BUN 12 8 - 23 MG/DL Creatinine 0.82 0.8 - 1.5 MG/DL  
 GFR est AA >60 >60 ml/min/1.73m2 GFR est non-AA >60 >60 ml/min/1.73m2 Calcium 9.0 8.3 - 10.4 MG/DL Bilirubin, total 0.4 0.2 - 1.1 MG/DL  
 ALT (SGPT) 34 12 - 65 U/L  
 AST (SGOT) 14 (L) 15 - 37 U/L Alk. phosphatase 107 50 - 136 U/L Protein, total 7.3 6.3 - 8.2 g/dL Albumin 3.7 3.2 - 4.6 g/dL Globulin 3.6 (H) 2.3 - 3.5 g/dL A-G Ratio 1.0 (L) 1.2 - 3.5 LIPASE Collection Time: 03/28/19 10:42 AM  
Result Value Ref Range Lipase 130 73 - 393 U/L  
BNP Collection Time: 03/28/19 10:42 AM  
Result Value Ref Range BNP 40 (H) 0 pg/mL D DIMER Collection Time: 03/28/19 10:42 AM  
Result Value Ref Range D DIMER 0.40 <0.56 ug/ml(FEU) POC TROPONIN-I Collection Time: 03/28/19 10:45 AM  
Result Value Ref Range Troponin-I (POC) 0 (L) 0.02 - 0.05 ng/ml TROPONIN I Collection Time: 03/28/19  3:53 PM  
Result Value Ref Range Troponin-I, Qt. <0.02 (L) 0.02 - 0.05 NG/ML  
TROPONIN I Collection Time: 03/28/19 10:04 PM  
Result Value Ref Range Troponin-I, Qt. <0.02 (L) 0.02 - 7.09 NG/ML  
METABOLIC PANEL, BASIC Collection Time: 03/29/19  3:47 AM  
Result Value Ref Range Sodium 143 136 - 145 mmol/L Potassium 3.3 (L) 3.5 - 5.1 mmol/L Chloride 105 98 - 107 mmol/L  
 CO2 33 (H) 21 - 32 mmol/L Anion gap 5 (L) 7 - 16 mmol/L Glucose 116 (H) 65 - 100 mg/dL BUN 12 8 - 23 MG/DL Creatinine 0.58 (L) 0.8 - 1.5 MG/DL  
 GFR est AA >60 >60 ml/min/1.73m2 GFR est non-AA >60 >60 ml/min/1.73m2 Calcium 8.3 8.3 - 10.4 MG/DL All Micro Results None Current Facility-Administered Medications Medication Dose Route Frequency  potassium chloride (K-DUR, KLOR-CON) SR tablet 40 mEq  40 mEq Oral NOW  amLODIPine (NORVASC) tablet 5 mg  5 mg Oral DAILY  aspirin chewable tablet 81 mg  81 mg Oral QHS  atorvastatin (LIPITOR) tablet 40 mg  40 mg Oral QHS  cholecalciferol (VITAMIN D3) tablet 2,000 Units  2,000 Units Oral DAILY  cyanocobalamin tablet 1,000 mcg  1,000 mcg Oral DAILY  losartan (COZAAR) tablet 100 mg  100 mg Oral DAILY  PHENobarbital (LUMINAL) tablet 97.2 mg  97.2 mg Oral QHS  pregabalin (LYRICA) capsule 150 mg  150 mg Oral BID  hydrALAZINE (APRESOLINE) 20 mg/mL injection 20 mg  20 mg IntraVENous Q4H PRN  
 sodium chloride (NS) flush 5-40 mL  5-40 mL IntraVENous Q8H  
 sodium chloride (NS) flush 5-40 mL  5-40 mL IntraVENous PRN  
 acetaminophen (TYLENOL) tablet 650 mg  650 mg Oral Q4H PRN  
 HYDROcodone-acetaminophen (NORCO) 5-325 mg per tablet 1 Tab  1 Tab Oral Q4H PRN  
 naloxone (NARCAN) injection 0.4 mg  0.4 mg IntraVENous PRN  
 diphenhydrAMINE (BENADRYL) capsule 25 mg  25 mg Oral Q4H PRN  
 ondansetron (ZOFRAN) injection 4 mg  4 mg IntraVENous Q4H PRN  
 bisacodyl (DULCOLAX) tablet 10 mg  10 mg Oral DAILY PRN  
 LORazepam (ATIVAN) tablet 0.5 mg  0.5 mg Oral Q4H PRN  
 enoxaparin (LOVENOX) injection 40 mg  40 mg SubCUTAneous Q24H  
 albuterol (PROVENTIL VENTOLIN) nebulizer solution 2.5 mg  2.5 mg Nebulization Q6H RT  
 0.9% sodium chloride infusion  75 mL/hr IntraVENous CONTINUOUS Other Studies: Xr Chest Pa Lat Result Date: 3/28/2019 EXAMINATION: CHEST RADIOGRAPH 3/28/2019 10:49 AM ACCESSION NUMBER: [id number] INDICATION: chest pain COMPARISON: Chest x-ray 2/22/2007 TECHNIQUE: PA and lateral views of the chest were obtained. FINDINGS: Cardiac Silhouette: There is unchanged mild enlargement of the cardiac silhouette.  Lungs: Minimal linear atelectasis in the periphery of the left lung base. No evidence of a focal pneumonia. Pleura: No pleural effusion. No pneumothorax. Slight accentuation of previously seen right apical pleural thickening. Osseous Structures: Thoracic spine spondylosis. Upper Abdomen: Unremarkable. IMPRESSION: 1. There is unchanged mild enlargement of the cardiac silhouette. 2. No evidence of a focal pneumonia. VOICE DICTATED BY: Dr. Blu Medina Assessment and Plan:  
 
Hospital Problems as of 3/29/2019 Date Reviewed: 3/26/2019 Codes Class Noted - Resolved POA * (Principal) Coronary artery disease involving native coronary artery with angina pectoris ([facility] ICD-10-CM: I25.119 ICD-9-CM: 414.01, 413.9  3/29/2019 - Present Yes Chest pain ICD-10-CM: R07.9 ICD-9-CM: 786.50  3/28/2019 - Present Yes Acute pneumonitis ICD-10-CM: J18.9 ICD-9-CM: 736  3/28/2019 - Present Yes HLD (hyperlipidemia) (Chronic) ICD-10-CM: F77.4 ICD-9-CM: 272.4  3/28/2019 - Present Yes Morbid obesity ([facility] (Chronic) ICD-10-CM: E66.01 
ICD-9-CM: 278.01  3/28/2019 - Present Yes  
   
 YAHIARA (obstructive sleep apnea) (Chronic) ICD-10-CM: A00.11 
ICD-9-CM: 327.23  3/27/2019 - Present Yes Hypertension (Chronic) ICD-10-CM: I10 
ICD-9-CM: 401.9  5/6/2010 - Present Yes Seizure disorder ([facility] (Chronic) ICD-10-CM: G40.909 ICD-9-CM: 345.90  5/6/2010 - Present Yes Plan: 
CP, SOB, CAD · LHC today for CP, CAD · Appreciate cardiology help Hypoxia · Albuterol nebs · Cont CPAP qHS Hx HTN, seizures · Cont home meds Discharge planning:  PPD ordered in case placement needed DVT ppx: lovenox Signed: 
Hiro Gold MD

## 2019-03-29 NOTE — PROGRESS NOTES
Patient hospitalized under Observation status. At baseline, he ambulates with a walker. He has a home CPAP. He is pending cardiac cath this afternoon. Patient may need home health follow-up at discharge. Case Management will continue to follow. Care Management Interventions PCP Verified by CM: Yes Transition of Care Consult (CM Consult): Discharge Planning Discharge Durable Medical Equipment: No 
Physical Therapy Consult: Yes Occupational Therapy Consult: Yes Speech Therapy Consult: No 
Current Support Network: Own Home Confirm Follow Up Transport: Family Plan discussed with Pt/Family/Caregiver: Yes Freedom of Choice Offered: Yes Discharge Location Discharge Placement: Home with home health

## 2019-03-29 NOTE — PROCEDURES
Brief Cardiac Procedure Note Patient: Robel Loaiza MRN: [id number]  SSN: [id number] YOB: 1945  Age: 68 y.o. Sex: male Date of Procedure: 3/29/2019 Pre-procedure Diagnosis: Unstable Angina Post-procedure Diagnosis: Non-cardiac Chest Pain Reason for Procedure: New Onset Angina < or = 2 Months Procedure: Left Heart Catheterization Brief Description of Procedure: via rra Performed By: Magui Tavarez MD  
 
Assistants: via Radha Starring Anesthesia: Moderate Sedation Estimated Blood Loss: Less than 10 mL Specimens: None Implants: None Findings:  
nml ef 
nml coronaries Complications: None Recommendations: Continue medical therapy. Signed By: Magui Tavarez MD   
 March 29, 2019

## 2019-03-30 PROBLEM — I25.119 CORONARY ARTERY DISEASE INVOLVING NATIVE CORONARY ARTERY WITH ANGINA PECTORIS (HCC): Status: RESOLVED | Noted: 2019-03-29 | Resolved: 2019-03-30

## 2019-03-30 PROBLEM — E34.9 HYPOTESTOSTERONEMIA: Chronic | Status: ACTIVE | Noted: 2018-09-07

## 2019-03-30 PROBLEM — R07.9 CHEST PAIN: Status: ACTIVE | Noted: 2019-03-30

## 2019-03-30 PROBLEM — R07.9 CHEST PAIN: Status: RESOLVED | Noted: 2019-03-28 | Resolved: 2019-03-30

## 2019-03-30 PROBLEM — E66.01 SEVERE OBESITY (BMI 35.0-39.9): Status: RESOLVED | Noted: 2018-09-07 | Resolved: 2019-03-30

## 2019-03-30 LAB
ANION GAP SERPL CALC-SCNC: 5 MMOL/L (ref 7–16)
BUN SERPL-MCNC: 11 MG/DL (ref 8–23)
CALCIUM SERPL-MCNC: 8.2 MG/DL (ref 8.3–10.4)
CHLORIDE SERPL-SCNC: 106 MMOL/L (ref 98–107)
CO2 SERPL-SCNC: 32 MMOL/L (ref 21–32)
CREAT SERPL-MCNC: 0.61 MG/DL (ref 0.8–1.5)
GLUCOSE SERPL-MCNC: 124 MG/DL (ref 65–100)
MAGNESIUM SERPL-MCNC: 2.2 MG/DL (ref 1.8–2.4)
POTASSIUM SERPL-SCNC: 3.5 MMOL/L (ref 3.5–5.1)
SODIUM SERPL-SCNC: 143 MMOL/L (ref 136–145)

## 2019-03-30 PROCEDURE — 97166 OT EVAL MOD COMPLEX 45 MIN: CPT

## 2019-03-30 PROCEDURE — 74011000250 HC RX REV CODE- 250: Performed by: INTERNAL MEDICINE

## 2019-03-30 PROCEDURE — 74011250636 HC RX REV CODE- 250/636: Performed by: INTERNAL MEDICINE

## 2019-03-30 PROCEDURE — 94640 AIRWAY INHALATION TREATMENT: CPT

## 2019-03-30 PROCEDURE — 77010033678 HC OXYGEN DAILY

## 2019-03-30 PROCEDURE — 97162 PT EVAL MOD COMPLEX 30 MIN: CPT

## 2019-03-30 PROCEDURE — 94761 N-INVAS EAR/PLS OXIMETRY MLT: CPT

## 2019-03-30 PROCEDURE — 74011250637 HC RX REV CODE- 250/637: Performed by: INTERNAL MEDICINE

## 2019-03-30 PROCEDURE — 97530 THERAPEUTIC ACTIVITIES: CPT

## 2019-03-30 PROCEDURE — 99218 HC RM OBSERVATION: CPT

## 2019-03-30 PROCEDURE — 65270000029 HC RM PRIVATE

## 2019-03-30 PROCEDURE — 94760 N-INVAS EAR/PLS OXIMETRY 1: CPT

## 2019-03-30 PROCEDURE — 80048 BASIC METABOLIC PNL TOTAL CA: CPT

## 2019-03-30 PROCEDURE — 36415 COLL VENOUS BLD VENIPUNCTURE: CPT

## 2019-03-30 PROCEDURE — 83735 ASSAY OF MAGNESIUM: CPT

## 2019-03-30 RX ADMIN — CYANOCOBALAMIN TAB 1000 MCG 1000 MCG: 1000 TAB at 08:19

## 2019-03-30 RX ADMIN — ALBUTEROL SULFATE 2.5 MG: 2.5 SOLUTION RESPIRATORY (INHALATION) at 19:05

## 2019-03-30 RX ADMIN — PREGABALIN 150 MG: 150 CAPSULE ORAL at 08:19

## 2019-03-30 RX ADMIN — PREGABALIN 150 MG: 150 CAPSULE ORAL at 16:17

## 2019-03-30 RX ADMIN — ALBUTEROL SULFATE 2.5 MG: 2.5 SOLUTION RESPIRATORY (INHALATION) at 14:20

## 2019-03-30 RX ADMIN — ENOXAPARIN SODIUM 40 MG: 40 INJECTION SUBCUTANEOUS at 08:00

## 2019-03-30 RX ADMIN — ATORVASTATIN CALCIUM 40 MG: 40 TABLET, FILM COATED ORAL at 20:57

## 2019-03-30 RX ADMIN — LOSARTAN POTASSIUM 100 MG: 50 TABLET ORAL at 08:19

## 2019-03-30 RX ADMIN — Medication 5 ML: at 16:15

## 2019-03-30 RX ADMIN — PHENOBARBITAL 97.2 MG: 97.2 TABLET ORAL at 20:57

## 2019-03-30 RX ADMIN — Medication 10 ML: at 20:59

## 2019-03-30 RX ADMIN — Medication 10 ML: at 05:39

## 2019-03-30 RX ADMIN — VITAMIN D, TAB 1000IU (100/BT) 2000 UNITS: 25 TAB at 08:19

## 2019-03-30 RX ADMIN — AMLODIPINE BESYLATE 5 MG: 5 TABLET ORAL at 08:19

## 2019-03-30 RX ADMIN — ALBUTEROL SULFATE 2.5 MG: 2.5 SOLUTION RESPIRATORY (INHALATION) at 07:07

## 2019-03-30 RX ADMIN — ASPIRIN 81 MG 81 MG: 81 TABLET ORAL at 20:57

## 2019-03-30 RX ADMIN — ENOXAPARIN SODIUM 40 MG: 40 INJECTION SUBCUTANEOUS at 20:58

## 2019-03-30 NOTE — PROGRESS NOTES
Problem: Mobility Impaired (Adult and Pediatric) Goal: *Acute Goals and Plan of Care (Insert Text) Description LTG: 
(1.)Mr. John will move from supine to sit and sit to supine , scoot up and down and roll side to side in bed with SUPERVISION within 7 treatment day(s). (2.)Mr. John will transfer from bed to chair and chair to bed with SUPERVISION using the least restrictive device within 7 treatment day(s). (3.)Mr. John will ambulate with STAND BY ASSIST for 25+ feet with the least restrictive device within 7 treatment day(s). ________________________________________________________________________________________________ Outcome: Progressing Towards Goal 
  
PHYSICAL THERAPY: Initial Assessment and PM 3/30/2019 OBSERVATION: PT Visit Days : 1 [place] / [place] B / Product Type: Medicare /   
  
NAME/AGE/GENDER: Farrah Jay is a 68 y.o. 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  
 Generalized Muscle Weakness (M62.81) Difficulty in walking, Not elsewhere classified (R26.2) Precaution/Allergies: 
Adhesive and Bactrim [sulfamethoxazole-trimethoprim] ASSESSMENT:  
 
Mr. Staci Tomas presents supine at arrival, but wife insisting she will help put pants on, exited room and when returning pt was at EOB. At this time pt is restricted NWB on R UE. He normally uses rollator, however today mobility will not use RW due to wt restriction. Wife reported that pt is vulnerable to falling bwd. He was able to stand with minAx2, ambulate using rafael walker 15ft Barbara, sat in chair after session. Pt on RA at arrival and 92%, during session he was on 2L and sat was above 94%.    His impairments include decreased functional mobility, decreased balance, decreased strength, decreased safety awareness, increased risk for falls. Pt would benefit from further PT while in house to address these impairments to help improve to prior level of independence. This section established at most recent assessment PROBLEM LIST (Impairments causing functional limitations): 
Decreased Strength Decreased ADL/Functional Activities Decreased Transfer Abilities Decreased Ambulation Ability/Technique Decreased Balance Decreased Pacing Skills Increased Shortness of Breath INTERVENTIONS PLANNED: (Benefits and precautions of physical therapy have been discussed with the patient.) Balance Exercise Bed Mobility Neuromuscular Re-education/Strengthening Range of Motion (ROM) Therapeutic Activites Therapeutic Exercise/Strengthening Transfer Training TREATMENT PLAN: Frequency/Duration: 3 times a week for duration of hospital stay Rehabilitation Potential For Stated Goals: Good RECOMMENDED REHABILITATION/EQUIPMENT: (at time of discharge pending progress): Due to the probability of continued deficits (see above) this patient will likely need continued skilled physical therapy after discharge. Equipment:  
None at this time HISTORY:  
History of Present Injury/Illness (Reason for Referral): 
69 y/o M with morbid obesity, YAHAIRA compliant with CPAP, HTN, seizures, who presented to ER with chest pain. Onset this morning after waking up, constant. Substernal, dull/pressure like, moderate. Made better by nitro/ASA given by EMS. A/w SOB. He denies any cough to me; says he has occasional chronic nonproductive cough; wife also says no acute cough. Reports house recently had some water damage to floors and the floors have been ripped up. There was some concern of mold and asbestos so these were sent off by company for testing. Pt has had some exposure and may have inhaled some dust.  Wife has not had any symptoms.   Has home oximeter and says oxygen has been lower than usual.  Poor functional status at baseline with walker but has had more [place] than usual.  Currently no CP. Mild SOB. Hypoxic to 87% on RA at rest so oxygen NC placed. Denies fevers, chills, HA. Reports chronic rhinorrhea unchanged. Denies allergies, watery/irritated eyes, sinus congestion, recent colds or other illnesses. Past Medical History/Comorbidities:  
Mr. Jass Garrido  has a past medical history of Anxiety, Cancer ([address] (2010), Chronic pain, Hyperlipidemia, Hypertension, Morbid obesity ([address] Osteoarthritis, Personal history of prostate cancer, Seizures ([address] (first one 30's), Sleep apnea, and Thromboembolus ([address] (~2009). He also has no past medical history of Difficult intubation, Malignant hyperthermia due to anesthesia, Nausea & vomiting, Pseudocholinesterase deficiency, or Stroke ([address] Mr. Jass Garrido  has a past surgical history that includes hx bladder suspension; hx prostatectomy (12/2010); hx other surgical; hx knee arthroscopy; hx knee replacement; hx orthopaedic; and hx cataract removal (Bilateral). Social History/Living Environment:  
Home Environment: Private residence # Steps to Enter: 0 Wheelchair Ramp: Yes One/Two Story Residence: One story Living Alone: No 
Support Systems: Spouse/Significant Other/Partner Patient Expects to be Discharged to[de-identified] Private residence Current DME Used/Available at Home: [employer], rollator Tub or Shower Type: Tub/Shower combination Prior Level of Function/Work/Activity: 
Residence is confusing. Presently there home is being worked on due to water damage. After discharge they plan on staying in hotel 1 night then retunring to home. There is lots of construction hazards around home from understanding from wife. Normally pt sits in recliner most of day and uses rollator. Number of Personal Factors/Comorbidities that affect the Plan of Care: 1-2: MODERATE COMPLEXITY EXAMINATION:  
Most Recent Physical Functioning: Gross Assessment: 
AROM: Within functional limits Strength: Within functional limits Posture: 
  
Balance: 
Sitting: Intact Standing: Impaired Standing - Static: Fair Standing - Dynamic : Fair Bed Mobility: 
  
Wheelchair Mobility: 
  
Transfers: 
Sit to Stand: Minimum assistance Stand to Sit: Minimum assistance Gait: 
  
Base of Support: Center of gravity altered; Widened Gait Abnormalities: Altered arm swing;Decreased step clearance; Path deviations; Shuffling gait; Step to gait Distance (ft): 15 Feet (ft) Assistive Device: Walker rafael Ambulation - Level of Assistance: Moderate assistance Body Structures Involved: 
Nerves Eyes and Ears Lungs Muscles Body Functions Affected: 
Mental 
Sensory/Pain Respiratory Neuromusculoskeletal 
Movement Related Activities and Participation Affected: 
General Tasks and Demands Mobility Self Care Domestic Life Community, Social and Media [place] Number of elements that affect the Plan of Care: 3: MODERATE COMPLEXITY CLINICAL PRESENTATION:  
Presentation: Evolving clinical presentation with changing clinical characteristics: MODERATE COMPLEXITY CLINICAL DECISION MAKING:  
[employer] AM-PAC? ?6 Clicks? Basic Mobility Inpatient Short Form How much difficulty does the patient currently have. .. Unable A Lot A Little None 1. Turning over in bed (including adjusting bedclothes, sheets and blankets)? ? 1   ? 2   ? 3   ? 4  
2. Sitting down on and standing up from a chair with arms ( e.g., wheelchair, bedside commode, etc.)   ? 1   ? 2   ? 3   ? 4  
3. Moving from lying on back to sitting on the side of the bed?   ? 1   ? 2   ? 3   ? 4 How much help from another person does the patient currently need. .. Total A Lot A Little None 4. Moving to and from a bed to a chair (including a wheelchair)? ? 1   ? 2   ? 3   ? 4  
5. Need to walk in hospital room?    ? 1   ? 2   ? 3   ? 4  
 6.  Climbing 3-5 steps with a railing? ? 1   ? 2   ? 3   ? 4  
© 2007, Trustees of [address] [place] [address] [address] [id number], under license to [employer]. All rights reserved Score:  Initial: 17 Most Recent: X (Date: -- ) Interpretation of Tool:  Represents activities that are increasingly more difficult (i.e. Bed mobility, Transfers, Gait). Medical Necessity:    
Skilled intervention continues to be required due to decreased function. Reason for Services/Other Comments: 
Patient continues to require skilled intervention due to medical complications and patient unable to attend/participate in therapy as expected Matthew Nuno Use of outcome tool(s) and clinical judgement create a POC that gives a: Questionable prediction of patient's progress: MODERATE COMPLEXITY  
  
 
 
 
TREATMENT:  
(In addition to Assessment/Re-Assessment sessions the following treatments were rendered) Pre-treatment Symptoms/Complaints:  none Pain: Initial:  
Pain Intensity 1: 0  Post Session:  0 Therapeutic Activity: (    8min): Therapeutic activities including Bed transfers, Chair transfers and Ambulation on level ground to improve mobility, strength, balance and coordination. Required moderate   to promote static and dynamic balance in standing, promote coordination of bilateral, lower extremity(s) and promote motor control of bilateral, lower extremity(s). Braces/Orthotics/Lines/Etc:  
IV 
2L  
O2 Device: Nasal cannula Treatment/Session Assessment:   
Response to Treatment:  see above Interdisciplinary Collaboration:  
Physical Therapist 
Registered Nurse After treatment position/precautions:  
Up in chair Bed alarm/tab alert on Bed/Chair-wheels locked RN notified Family at bedside Compliance with Program/Exercises: Will assess as treatment progresses Recommendations/Intent for next treatment session: \"Next visit will focus on advancements to more challenging activities and reduction in assistance provided\". Total Treatment Duration: PT Patient Time In/Time Out Time In: 1300 Time Out: 1325 Rossana Mcintosh DPT

## 2019-03-30 NOTE — PROGRESS NOTES
Hospitalist H&P Note Admit Date:  3/28/2019 10:18 AM  
Name:  Monisha Krishnamurthy Age:  68 y.o. 
:  1945 MRN:  [id number] PCP:  Nova Ríos MD 
Treatment Team: Attending Provider: Ana Arita MD; Utilization Review: Obdulia Ashraf; Occupational Therapist: Jaymie Spaulding, OTR/L; Physical Therapist: Kevin Clark DPT 
 
HPI/Subjective:  
Pt is a 67 y/o M with morbid obesity, YAHAIRA compliant with CPAP, HTN, seizures, who presented to ER with chest pain. Onset this morning after waking up, constant. Substernal, dull/pressure like, moderate. Made better by nitro/ASA given by EMS. A/w SOB. Also hypoxia. Some history of renovation  Related dust exposure in house. CT chest showed CAD. No previous CAD dx or even cardiology visits. Cardiology consulted for [facility]. 3/30- pt off oxygen today. Feeling better. Less SOB. No CP. Cannot go home today due to restrictions on R arm use post-C and uses rolling walker at home. Objective:  
 
Patient Vitals for the past 24 hrs: 
 Temp Pulse Resp BP SpO2  
19 0710     (!) 88 % 19 0709 98.1 °F (36.7 °C) 80 20 152/68 92 % 19 0351 98.2 °F (36.8 °C) 69 18 154/71 91 %  
19 2323     91 %  
19 2305 97.6 °F (36.4 °C) 73 17 160/74 91 %  
19 2002 98 °F (36.7 °C) 66 17 159/79 92 % 19 1930    165/74 92 % 19 1920  65  179/72 93 % 19 1910  73  164/73 92 % 19 1900  81  165/72 90 % 19 1850  76  171/75 91 %  
19 1840  76  172/74 91 %  
19 1830  72  171/61 94 % 19 1819  72 16 161/73 97 % 19 1500 97.6 °F (36.4 °C) 60 20 152/70 97 % 19 1402     94 % Oxygen Therapy O2 Sat (%): (!) 88 %(Pt placed on 2L for SATs >90%) (19 0710) Pulse via Oximetry: 63 beats per minute (19 0710) O2 Device: CPAP nasal (19 7996) O2 Flow Rate (L/min): 2 l/min (19 1402) FIO2 (%): 21 % (03/29/19 3393) Intake/Output Summary (Last 24 hours) at 3/30/2019 1136 Last data filed at 3/30/2019 8816 Gross per 24 hour Intake  Output 1100 ml Net -1100 ml *Note that automatically entered I/Os may not be accurate; dependent on patient compliance with collection and accurate  by assistants. Physical Exam: 
General:    Well nourished. Alert. obese CV:   RRR. No m/r/g. Lungs:  CTAB. No wheezing, rhonchi, or rales. Extremities: Warm and dry. No cyanosis or edema. Skin:     No rashes or jaundice. Normal coloration Psych:  Normal mood and affect. I reviewed the labs, imaging, EKGs, telemetry, and other studies done this admission. Data Review:  
Recent Results (from the past 24 hour(s)) METABOLIC PANEL, BASIC Collection Time: 03/30/19  3:13 AM  
Result Value Ref Range Sodium 143 136 - 145 mmol/L Potassium 3.5 3.5 - 5.1 mmol/L Chloride 106 98 - 107 mmol/L  
 CO2 32 21 - 32 mmol/L Anion gap 5 (L) 7 - 16 mmol/L Glucose 124 (H) 65 - 100 mg/dL BUN 11 8 - 23 MG/DL Creatinine 0.61 (L) 0.8 - 1.5 MG/DL  
 GFR est AA >60 >60 ml/min/1.73m2 GFR est non-AA >60 >60 ml/min/1.73m2 Calcium 8.2 (L) 8.3 - 10.4 MG/DL MAGNESIUM Collection Time: 03/30/19  3:13 AM  
Result Value Ref Range Magnesium 2.2 1.8 - 2.4 mg/dL All Micro Results None Current Facility-Administered Medications Medication Dose Route Frequency  tuberculin injection 5 Units  5 Units IntraDERMal ONCE  
 enoxaparin (LOVENOX) injection 40 mg  40 mg SubCUTAneous Q12H  
 heparin (PF) 2 units/ml in NS infusion  2 Units/hr IntraVENous CONTINUOUS  
 lidocaine (XYLOCAINE) 10 mg/mL (1 %) injection 3-10 mL  3-10 mL IntraDERMal Multiple  midazolam (VERSED) injection 0.5-2 mg  0.5-2 mg IntraVENous Multiple  amLODIPine (NORVASC) tablet 5 mg  5 mg Oral DAILY  aspirin chewable tablet 81 mg  81 mg Oral QHS  atorvastatin (LIPITOR) tablet 40 mg  40 mg Oral QHS  cholecalciferol (VITAMIN D3) tablet 2,000 Units  2,000 Units Oral DAILY  cyanocobalamin tablet 1,000 mcg  1,000 mcg Oral DAILY  losartan (COZAAR) tablet 100 mg  100 mg Oral DAILY  PHENobarbital (LUMINAL) tablet 97.2 mg  97.2 mg Oral QHS  pregabalin (LYRICA) capsule 150 mg  150 mg Oral BID  hydrALAZINE (APRESOLINE) 20 mg/mL injection 20 mg  20 mg IntraVENous Q4H PRN  
 sodium chloride (NS) flush 5-40 mL  5-40 mL IntraVENous Q8H  
 sodium chloride (NS) flush 5-40 mL  5-40 mL IntraVENous PRN  
 acetaminophen (TYLENOL) tablet 650 mg  650 mg Oral Q4H PRN  
 HYDROcodone-acetaminophen (NORCO) 5-325 mg per tablet 1 Tab  1 Tab Oral Q4H PRN  
 naloxone (NARCAN) injection 0.4 mg  0.4 mg IntraVENous PRN  
 diphenhydrAMINE (BENADRYL) capsule 25 mg  25 mg Oral Q4H PRN  
 ondansetron (ZOFRAN) injection 4 mg  4 mg IntraVENous Q4H PRN  
 bisacodyl (DULCOLAX) tablet 10 mg  10 mg Oral DAILY PRN  
 LORazepam (ATIVAN) tablet 0.5 mg  0.5 mg Oral Q4H PRN  
 albuterol (PROVENTIL VENTOLIN) nebulizer solution 2.5 mg  2.5 mg Nebulization Q6H RT  
 0.9% sodium chloride infusion  75 mL/hr IntraVENous CONTINUOUS Other Studies: Xr Chest Pa Lat Result Date: 3/28/2019 EXAMINATION: CHEST RADIOGRAPH 3/28/2019 10:49 AM ACCESSION NUMBER: [id number] INDICATION: chest pain COMPARISON: Chest x-ray 2/22/2007 TECHNIQUE: PA and lateral views of the chest were obtained. FINDINGS: Cardiac Silhouette: There is unchanged mild enlargement of the cardiac silhouette. Lungs: Minimal linear atelectasis in the periphery of the left lung base. No evidence of a focal pneumonia. Pleura: No pleural effusion. No pneumothorax. Slight accentuation of previously seen right apical pleural thickening. Osseous Structures: Thoracic spine spondylosis. Upper Abdomen: Unremarkable. IMPRESSION: 1.  There is unchanged mild enlargement of the cardiac silhouette. 2. No evidence of a focal pneumonia. VOICE DICTATED BY: Dr. Marelyn Merlin Assessment and Plan:  
 
Hospital Problems as of 3/30/2019 Date Reviewed: 3/26/2019 Codes Class Noted - Resolved POA Acute pneumonitis ICD-10-CM: J18.9 ICD-9-CM: 048  3/28/2019 - Present Yes HLD (hyperlipidemia) (Chronic) ICD-10-CM: Q82.8 ICD-9-CM: 272.4  3/28/2019 - Present Yes Morbid obesity ([facility] [address] (Chronic) ICD-10-CM: E66.01 
ICD-9-CM: 278.01  3/28/2019 - Present Yes  
   
 YAHAIRA (obstructive sleep apnea) (Chronic) ICD-10-CM: X70.01 
ICD-9-CM: 327.23  3/27/2019 - Present Yes Hypertension (Chronic) ICD-10-CM: I10 
ICD-9-CM: 401.9  5/6/2010 - Present Yes Seizure disorder ([facility] (Chronic) ICD-10-CM: G40.909 ICD-9-CM: 345.90  5/6/2010 - Present Yes * (Principal) RESOLVED: Coronary artery disease involving native coronary artery with angina pectoris ([facility] [address] ICD-10-CM: I25.119 ICD-9-CM: 414.01, 413.9  3/29/2019 - 3/30/2019 Yes RESOLVED: Chest pain ICD-10-CM: R07.9 ICD-9-CM: 786.50  3/28/2019 - 3/30/2019 Yes Plan: 
CP, SOB, CAD · LHC with normal EF, no CAD · Cannot use R arm today. Needs for rolling walker use. Can go home tomorrow per cardiology Hypoxia · Improved. Likely pneumonitis · Albuterol nebs · Cont CPAP qHS Hx HTN, seizures · Cont home meds Discharge planning:  Home tomorrow DVT ppx: lovenox Signed: 
Kristian Aguayo MD

## 2019-03-30 NOTE — PROGRESS NOTES
Patients sat was 88 on room air. It took 2L of o2 to get patients sat above 90. The patient requested not to walk because his hand hurts. He requested to do his walk test tonight.

## 2019-03-30 NOTE — PROGRESS NOTES
Received bedside shift report from 1 [facility], Keith Sherif. Patient resting quietly in bed at this time, awake, respirations even and unlabored. Denies needs at this time. Bed low and locked. Bedside table, personal belongings and call light within reach. Wife at bedside.

## 2019-03-30 NOTE — PROGRESS NOTES
Pt seen/examined. Doing well. On RA. Uses rolling walker at home. Will have to ask cardiology if he can use his R arm for this purpose for short distances. If so, can discharge today

## 2019-03-30 NOTE — PROGRESS NOTES
Problem: Self Care Deficits Care Plan (Adult) Goal: *Acute Goals and Plan of Care (Insert Text) Description 1. Patient will complete lower body bathing and dressing with minimal assistance and adaptive equipment as needed. 2. Patient will complete toileting with supervision. 3. Patient will tolerate 20 minutes of OT treatment with as needed rest breaks to increase activity tolerance for ADLs. 4. Patient will complete functional transfers with supervision and adaptive equipment as needed. 5. Patient will refrain from using RUE for functional tasks until cleared by MD. Timeframe: 7 visits Outcome: Progressing Towards Goal 
  
 
OCCUPATIONAL THERAPY: Initial Assessment 3/30/2019 OBSERVATION:   
[place] / [place] / Product Type: Medicare /  
  
NAME/AGE/GENDER: General Payer is a 68 y.o. 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  
 Other lack of cordination (R27.8) Precautions/Allergies: LIMIT MOVEMENT OF RUE Adhesive and Bactrim [sulfamethoxazole-trimethoprim] ASSESSMENT:  
Mr. Debbi Gleason is a 68year old male admitted with chest pain and underwent heart cath on 3/29. No unable to use RUE for walker and needs to limit movement of RUE, per MD note. At baseline he lives with his wife and is typically independent/ modified independent with ADLs. He lives a sedentary lifestyle and spends most of his time in his recliner. He uses a rollator for mobility. He is R hand dominant. Patient seated edge of bed upon arrival with wife present at bedside. Reports no pain. Alert and oriented. O2 sats 96% on 2 L O2. BUE assessment reveals LUE ROM is WNL, LUE strength is 5/5, coordination decreased due to ongoing tremor.  RUE not tested due to movement restrictions. Patient required cues throughout session to not use RUE. Balance is intact in sitting, impaired in standing (fair). He is able to stand with CGA/ minimal assistance. He is likely functioning close to his baseline, however cannot currently use his dominant UE, which limits his ability to perform ADLs independently. Per MD, he can begin to use RUE tomorrow, so I expect he will progress back to his baseline level for ADLs. He would benefit from continued occupational therapy to maximize independence. Will follow. This section established at most recent assessment PROBLEM LIST (Impairments causing functional limitations): 
Decreased ADL/Functional Activities Decreased Transfer Abilities Decreased Ambulation Ability/Technique Decreased Balance Decreased Activity Tolerance Decreased Knowledge of Precautions INTERVENTIONS PLANNED: (Benefits and precautions of occupational therapy have been discussed with the patient.) Activities of daily living training Adaptive equipment training Therapeutic activity Therapeutic exercise TREATMENT PLAN: Frequency/Duration: Follow patient 3x/ week  to address above goals. Rehabilitation Potential For Stated Goals: Good RECOMMENDED REHABILITATION/EQUIPMENT: (at time of discharge pending progress): Due to the probability of continued deficits (see above) this patient will not likely need continued skilled occupational therapy after discharge. Equipment:  
Wife interested in potential bariatric C OCCUPATIONAL PROFILE AND HISTORY:  
History of Present Injury/Illness (Reason for Referral): 
See H&P Past Medical History/Comorbidities:  
Mr. Kenyon Cabral  has a past medical history of Anxiety, Cancer ([address] (2010), Chronic pain, Hyperlipidemia, Hypertension, Morbid obesity ([address] Osteoarthritis, Personal history of prostate cancer, Seizures ([facility] (first one 30's), Sleep apnea, and Thromboembolus ([facility] (~2009). He also has no past medical history of Difficult intubation, Malignant hyperthermia due to anesthesia, Nausea & vomiting, Pseudocholinesterase deficiency, or Stroke ([facility] Mr. Domenica Pablo  has a past surgical history that includes hx bladder suspension; hx prostatectomy (12/2010); hx other surgical; hx knee arthroscopy; hx knee replacement; hx orthopaedic; and hx cataract removal (Bilateral). Social History/Living Environment:  
Home Environment: Private residence # Steps to Enter: 0 Wheelchair Ramp: Yes One/Two Story Residence: One story Living Alone: No 
Support Systems: Spouse/Significant Other/Partner Patient Expects to be Discharged to[de-identified] Private residence Current DME Used/Available at Home: [employer], rollator, [address] [employer] [address] chair Tub or Shower Type: Tub/Shower combination Prior Level of Function/Work/Activity: At baseline he lives with his wife and is typically independent/ modified independent with ADLs. He lives a sedentary lifestyle and spends most of his time in his recliner. He uses a rollator for mobility. He is R hand dominant. Number of Personal Factors/Comorbidities that affect the Plan of Care: Expanded review of therapy/medical records (1-2):  MODERATE COMPLEXITY ASSESSMENT OF OCCUPATIONAL PERFORMANCE[de-identified]  
Activities of Daily Living:  
Basic ADLs (From Assessment) Complex ADLs (From Assessment) Feeding: Minimum assistance Oral Facial Hygiene/Grooming: Minimum assistance Bathing: Moderate assistance Upper Body Dressing: Minimum assistance Lower Body Dressing: Moderate assistance Toileting: Moderate assistance Instrumental ADL Meal Preparation: Maximum assistance Homemaking: Maximum assistance Grooming/Bathing/Dressing Activities of Daily Living Cognitive Retraining Safety/Judgement: Decreased awareness of need for safety; Fall prevention Bed/Mat Mobility Sit to Stand: Contact guard assistance Stand to Sit: Minimum assistance Bed to Chair: Contact guard assistance Scooting: Contact guard assistance Most Recent Physical Functioning:  
Gross Assessment: 
AROM: Within functional limits(LUE only. RUE not tested) Strength: Within functional limits(LUE only. RUE not tested) Coordination: Generally decreased, functional(tremor BUE ) Posture: 
  
Balance: 
Sitting: Intact Standing: Impaired Standing - Static: Fair Standing - Dynamic : Fair Bed Mobility: 
Scooting: Contact guard assistance Wheelchair Mobility: 
  
Transfers: 
Sit to Stand: Contact guard assistance Stand to Sit: Minimum assistance Bed to Chair: Contact guard assistance Patient Vitals for the past 6 hrs: 
 BP SpO2 Pulse 03/30/19 1127 161/78 94 % 75 Mental Status Neurologic State: Alert Orientation Level: Oriented X4 Cognition: Follows commands, Poor safety awareness Perception: Appears intact Perseveration: No perseveration noted Safety/Judgement: Decreased awareness of need for safety, Fall prevention Physical Skills Involved: 
Range of Motion Balance Activity Tolerance Cognitive Skills Affected (resulting in the inability to perform in a timely and safe manner): 
None  Psychosocial Skills Affected: 
Habits/Routines Environmental Adaptation Self-Awareness Social Roles Number of elements that affect the Plan of Care: 5+:  HIGH COMPLEXITY CLINICAL DECISION MAKING:  
[employer] AM-PAC? ?6 Clicks? Daily Activity Inpatient Short Form How much help from another person does the patient currently need. .. Total A Lot A Little None 1. Putting on and taking off regular lower body clothing? ? 1   ? 2   ? 3   ? 4  
2. Bathing (including washing, rinsing, drying)? ? 1   ? 2   ? 3   ? 4  
3. Toileting, which includes using toilet, bedpan or urinal?   ? 1   ? 2   ? 3   ? 4  
4. Putting on and taking off regular upper body clothing?    ? 1   ? 2   ? 3   ? 4  
 5. Taking care of personal grooming such as brushing teeth? ? 1   ? 2   ? 3   ? 4  
6. Eating meals? ? 1   ? 2   ? 3   ? 4  
© 2007, Trustees of [address] [place] [address] [address] [address], under license to [employer]. All rights reserved Score:  Initial: 14 Most Recent: X (Date: -- ) Interpretation of Tool:  Represents activities that are increasingly more difficult (i.e. Bed mobility, Transfers, Gait). Medical Necessity:    
Patient demonstrates good 
 rehab potential due to higher previous functional level. Reason for Services/Other Comments: 
Patient continues to require present interventions due to patient's inability to care for self without using dominant RUE Jeison Reasoner Use of outcome tool(s) and clinical judgement create a POC that gives a: MODERATE COMPLEXITY  
 
 
 
TREATMENT:  
(In addition to Assessment/Re-Assessment sessions the following treatments were rendered) Pre-treatment Symptoms/Complaints:  None Pain: Initial:  
Pain Intensity 1: 0  Post Session:  none Assessment/Reassessment only, no treatment provided today Braces/Orthotics/Lines/Etc:  
IV 
O2 Device: CPAP nasal- Nasal cannula Treatment/Session Assessment:   
Response to Treatment:  tolerated well; better than expected Interdisciplinary Collaboration:  
Physical Therapist 
Occupational Therapist 
Registered Nurse After treatment position/precautions: With PT for PT evaluation Compliance with Program/Exercises: Will assess as treatment progresses. Recommendations/Intent for next treatment session: \"Next visit will focus on advancements to more challenging activities and reduction in assistance provided\". Total Treatment Duration: OT Patient Time In/Time Out Time In: 1244 Time Out: 1300 Madina Ponce OTR/L

## 2019-03-30 NOTE — PROGRESS NOTES
3/30/2019 11:19 AM 
 
Admit Date: 3/28/2019 Subjective:  
 
[address] denies chest pain. Heart cath no sig disease Objective:  
  
Visit Vitals /68 (BP 1 Location: Left arm, BP Patient Position: At rest) Pulse 80 Temp 98.1 °F (36.7 °C) Resp 20 Ht 6' (1.829 m) Wt 139.1 kg (306 lb 11.2 oz) SpO2 (!) 88% Comment: Pt placed on 2L for SATs >90% BMI 41.60 kg/m² Physical Exam: 
Heart: regular rate and rhythm Lungs: clear to auscultation bilaterally Data Review:  
Labs:   
Recent Results (from the past 24 hour(s)) METABOLIC PANEL, BASIC Collection Time: 03/30/19  3:13 AM  
Result Value Ref Range Sodium 143 136 - 145 mmol/L Potassium 3.5 3.5 - 5.1 mmol/L Chloride 106 98 - 107 mmol/L  
 CO2 32 21 - 32 mmol/L Anion gap 5 (L) 7 - 16 mmol/L Glucose 124 (H) 65 - 100 mg/dL BUN 11 8 - 23 MG/DL Creatinine 0.61 (L) 0.8 - 1.5 MG/DL  
 GFR est AA >60 >60 ml/min/1.73m2 GFR est non-AA >60 >60 ml/min/1.73m2 Calcium 8.2 (L) 8.3 - 10.4 MG/DL MAGNESIUM Collection Time: 03/30/19  3:13 AM  
Result Value Ref Range Magnesium 2.2 1.8 - 2.4 mg/dL Assessment:  
 
Patient Active Problem List  
 Diagnosis Date Noted  Acute pneumonitis 03/28/2019  HLD (hyperlipidemia) 03/28/2019  Morbid obesity ([address] 03/28/2019  YAHAIRA (obstructive sleep apnea) 03/27/2019  Hypotestosteronemia 09/07/2018  Hematuria, gross 09/04/2016  Radiation cystitis 09/04/2016  S/P cystoscopy 09/03/2016  Cuff erosion of artificial urinary sphincter ([address] 08/19/2016  Stress incontinence 04/23/2016  Male stress incontinence 05/12/2015  Achilles tendon injury 03/12/2015  Tibialis tendinitis 03/12/2015  Malignant neoplasm of prostate ([address] 03/09/2015  Bladder neck obstruction 03/09/2015  Osteoarthritis of right knee 05/06/2010  Status post total knee replacement 05/06/2010  Hypertension 05/06/2010  [facility] disorder [facility] 05/06/2010  Hyperglycemia 05/06/2010 Cath no sig disease Plan:  
Pt can use walker in the AM

## 2019-03-30 NOTE — PROGRESS NOTES
Patient returned to room, wife at bedside. Dressing to right wrist d/i . Verbalizes understanding to avoid use of wrist through the night. Will monitor.

## 2019-03-30 NOTE — PROGRESS NOTES
Patient resting quietly in bed, using home c-pap, wife at bedside. No c/o discomfort at this time. Dressing to right wrist remains dry and intact. Will monitor.

## 2019-03-30 NOTE — PROGRESS NOTES
Patient placed on home CPAP machine. 03/29/19 2323 Oxygen Therapy O2 Sat (%) 91 % Pulse via Oximetry 73 beats per minute O2 Device CPAP nasal  
FIO2 (%) 21 % CPAP/BIPAP  
CPAP/BIPAP Start/Stop On Device Mode CPAP Mask Type and Size Nasal mask Skin Condition Intact Total RR (Spontaneous) 17 breaths per minute Pt's Home Machine Yes (type/vendor) Biomedical Check Performed Yes Settings Verified Yes

## 2019-03-30 NOTE — PROGRESS NOTES
Patient stable with no complaints at this time. Patient is resting in bed watching TV. Patient denies any pain and appears comfortable at this time. Call light within reach and patient instructed to call if assistance is needed. Report to be given to oncoming RN 7p-7a

## 2019-03-30 NOTE — PROGRESS NOTES
Patient has rested quietly through the night, no c/o discomfort. Wife at bedside. Dressing on right wrist has remained dry and intact. Has worn c-pap through the night.

## 2019-03-30 NOTE — PROGRESS NOTES
Pt on Room Air at rest SAT-90%/HR-78. Pt ambulated on Room Air SAT-87% so Pt placed on 1L. SAT-89% on 1L walking. Pt placed on 2L SAT-91%/ HR-90. Pt on 2L back to room At rest SAT-93%

## 2019-03-31 ENCOUNTER — HOME HEALTH ADMISSION (OUTPATIENT)
Dept: HOME HEALTH SERVICES | Facility: HOME HEALTH | Age: 74
End: 2019-03-31
Payer: MEDICARE

## 2019-03-31 VITALS
HEART RATE: 77 BPM | RESPIRATION RATE: 20 BRPM | OXYGEN SATURATION: 98 % | HEIGHT: 72 IN | DIASTOLIC BLOOD PRESSURE: 84 MMHG | TEMPERATURE: 97.6 F | WEIGHT: 309.7 LBS | SYSTOLIC BLOOD PRESSURE: 172 MMHG | BODY MASS INDEX: 41.95 KG/M2

## 2019-03-31 LAB
ANION GAP SERPL CALC-SCNC: 5 MMOL/L (ref 7–16)
BUN SERPL-MCNC: 11 MG/DL (ref 8–23)
CALCIUM SERPL-MCNC: 8.3 MG/DL (ref 8.3–10.4)
CHLORIDE SERPL-SCNC: 107 MMOL/L (ref 98–107)
CO2 SERPL-SCNC: 33 MMOL/L (ref 21–32)
CREAT SERPL-MCNC: 0.62 MG/DL (ref 0.8–1.5)
GLUCOSE SERPL-MCNC: 126 MG/DL (ref 65–100)
POTASSIUM SERPL-SCNC: 3.6 MMOL/L (ref 3.5–5.1)
SODIUM SERPL-SCNC: 145 MMOL/L (ref 136–145)

## 2019-03-31 PROCEDURE — 74011250637 HC RX REV CODE- 250/637: Performed by: HOSPITALIST

## 2019-03-31 PROCEDURE — 74011250637 HC RX REV CODE- 250/637: Performed by: INTERNAL MEDICINE

## 2019-03-31 PROCEDURE — 36415 COLL VENOUS BLD VENIPUNCTURE: CPT

## 2019-03-31 PROCEDURE — 94640 AIRWAY INHALATION TREATMENT: CPT

## 2019-03-31 PROCEDURE — 74011250636 HC RX REV CODE- 250/636: Performed by: INTERNAL MEDICINE

## 2019-03-31 PROCEDURE — 94760 N-INVAS EAR/PLS OXIMETRY 1: CPT

## 2019-03-31 PROCEDURE — 80048 BASIC METABOLIC PNL TOTAL CA: CPT

## 2019-03-31 PROCEDURE — 74011000250 HC RX REV CODE- 250: Performed by: INTERNAL MEDICINE

## 2019-03-31 RX ORDER — AMLODIPINE BESYLATE 10 MG/1
10 TABLET ORAL DAILY
Status: DISCONTINUED | OUTPATIENT
Start: 2019-03-31 | End: 2019-03-31 | Stop reason: HOSPADM

## 2019-03-31 RX ORDER — NYSTATIN 100000 [USP'U]/G
POWDER TOPICAL 2 TIMES DAILY
Status: DISCONTINUED | OUTPATIENT
Start: 2019-03-31 | End: 2019-03-31 | Stop reason: HOSPADM

## 2019-03-31 RX ADMIN — LOSARTAN POTASSIUM 100 MG: 50 TABLET ORAL at 08:47

## 2019-03-31 RX ADMIN — ENOXAPARIN SODIUM 40 MG: 40 INJECTION SUBCUTANEOUS at 08:46

## 2019-03-31 RX ADMIN — PREGABALIN 150 MG: 150 CAPSULE ORAL at 08:46

## 2019-03-31 RX ADMIN — ALBUTEROL SULFATE 2.5 MG: 2.5 SOLUTION RESPIRATORY (INHALATION) at 01:10

## 2019-03-31 RX ADMIN — Medication 10 ML: at 05:21

## 2019-03-31 RX ADMIN — AMLODIPINE BESYLATE 10 MG: 10 TABLET ORAL at 08:46

## 2019-03-31 RX ADMIN — VITAMIN D, TAB 1000IU (100/BT) 2000 UNITS: 25 TAB at 08:46

## 2019-03-31 NOTE — PROGRESS NOTES
[facility] - INPATIENT Face to Face Encounter Patients Name: Monisha Krishnamurthy    YOB: 1945 Ordering Physician: Carlene Candelaria Primary Diagnosis: Chest pain [R07.9] Chest pain [R07.9] Date of Face to Face:   3/31/2019 Face to Face Encounter findings are related to primary reason for home care:   yes. 1. I certify that the patient needs intermittent care as follows: skilled nursing care:  skilled observation/assessment, patient education 2. I certify that this patient is homebound, that is: 1) patient requires the use of a walker device, special transportation, or assistance of another to leave the home; or 2) patient's condition makes leaving the home medically contraindicated; and 3) patient has a normal inability to leave the home and leaving the home requires considerable and taxing effort. Patient may leave the home for infrequent and short duration for medical reasons, and occasional absences for non-medical reasons. Homebound status is due to the following functional limitations: Patient with strength deficits limiting the performance of all ADL's without caregiver assistance or the use of an assistive device. 3. I certify that this patient is under my care and that I, or a nurse practitioner or  [id number], or clinical nurse specialist, or certified nurse midwife, working with me, had a Face-to-Face Encounter that meets the physician Face-to-Face Encounter requirements. The following are the clinical findings from the [address] [place] [address] encounter that support the need for skilled services and is a summary of the encounter: see hospital medical record See summary of the patient's illness Stevan Thompson LMSW 
3/31/2019 THE FOLLOWING TO BE COMPLETED BY THE COMMUNITY PHYSICIAN: 
 
I concur with the findings described above from the [facility] encounter that this patient is homebound and in need of a skilled service. Certifying Physician: _____________________________________ Printed Certifying Physician Name: _____________________________________ Date: _________________

## 2019-03-31 NOTE — PROGRESS NOTES
Patient left floor via wheel chair per staff member, Cara to the company of his wifes care with no distress noted on RA.

## 2019-03-31 NOTE — PROGRESS NOTES
Oxygen Qualifier Room air: SpO2 with O2 and liter flow Resting SpO2  92% Ambulating SpO2 90% Completed by: 
 
Leighann Herrera, RT

## 2019-03-31 NOTE — PROGRESS NOTES
Bedside shift report completed with oncoming nurseGreg. Patient resting quietly in bed at this time, eyes closed, respirations even and unlabored. No needs stated. Bed low and locked. Bedside table, personal belongings and call light within reach. Wife remains at bedside.

## 2019-03-31 NOTE — PROGRESS NOTES
Care Management Interventions PCP Verified by CM: Yes Transition of Care Consult (CM Consult): Discharge Planning, Home Health Discharge Durable Medical Equipment: Yes([facility] for bariatric [address]) Physical Therapy Consult: Yes Occupational Therapy Consult: Yes Speech Therapy Consult: No 
Current Support Network: Own Home Confirm Follow Up Transport: Family Plan discussed with Pt/Family/Caregiver: Yes Freedom of Choice Offered: Yes Discharge Location Discharge Placement: Home with home health Pt discharged home with spouse.   Referral to [facility] for follow up care and a BSC ordered from [facility]

## 2019-03-31 NOTE — PROGRESS NOTES
Discharge paperwork reviewed with patient and his wife. Both verbalize understanding of instructions, medications and follow up. IV DC'd with cath tip intact and telemetry removed and placed at nurses station. The patient would like to eat lunch before leaving. Kaleigh Moreno RN notified instructions are complete.

## 2019-03-31 NOTE — DISCHARGE INSTRUCTIONS
PCP in 1-2 weeks,   Cardiac diet,   Activity as tolerated,   CPAP at nights      HEART CATHETERIZATION/ANGIOGRAPHY DISCHARGE INSTRUCTIONS    1. Check puncture site frequently for swelling or bleeding. If there is any bleeding, apply pressure over the area with a clean towel or washcloth. If you are unable to stop the bleeding in 15-20 minutes call 911. Notify your doctor for any redness, swelling, drainage, or oozing from the puncture site. Notify your doctor for any fever, chills or other signs of infection. 2. If the extremity becomes cold, numb, or painful call [facility] at [phone].  3. Activity should be limited for the next 48 hours. Avoid pushing, pulling, or strenuous activity for 48 hours. No heavy lifting (anything over 5 pounds) for 3 days. No driving for 48 hours. 4. You may resume your usual diet. Drink more fluids than usual, water is best.  5. Have a responsible person drive you home and stay with you for at least 24 hours after your heart catheterization/angiography. 6. You may remove bandage from your right wrist in 24 hours. You may shower in 24 hours. No tub baths, hot tubs, or swimming for 1 week. Do not wash dishes for 1 week. Do not place any lotions, creams, powders, or ointments over puncture site for 1 week. You may place a clean band-aid over the puncture site each day for 5 days. Change daily. I have read the above instructions and have had the opportunity to ask questions. Patient Education        Coronary Artery Disease: Care Instructions  Your Care Instructions    The heart is a muscle, and like any muscle, it needs blood to work well. Coronary artery disease occurs when the arteries that bring oxygen-rich blood to your heart have a buildup of plaque--deposits of fats and other substances. Plaque can reduce blood flow to the heart muscle. This can cause angina symptoms such as chest pain or pressure. A heart attack can happen if blood flow is completely blocked.   You can do a lot to improve your health and prevent a heart attack. Eating healthy food, not smoking, getting regular exercise, and taking your medicine are the main things you can do every day to stay healthy. Follow-up care is a key part of your treatment and safety. Be sure to make and go to all appointments, and call your doctor if you are having problems. It's also a good idea to know your test results and keep a list of the medicines you take. How can you care for yourself at home? Medicines    · Be safe with medicines. Take your medicines exactly as prescribed. Call your doctor if you think you are having a problem with your medicine. You will get more details on the specific medicines your doctor prescribes. You may need several medicines. ? Angiotensin-converting enzyme (ACE) inhibitors, angiotensin II receptor blockers (ARBs), beta-blockers, and statins can help prevent a heart attack. ACE inhibitors, ARBs, and beta-blockers help lower your blood pressure. Statins help lower cholesterol, which is a type of fat that can clog your arteries. ? Nitrates can help make chest pain happen less often. ? Aspirin and other blood thinners help prevent heart attacks and strokes.     · If your doctor has given you nitroglycerin for angina symptoms (such as chest pain or pressure) keep it with you at all times. If you have symptoms, sit down and rest, and take the first dose of nitroglycerin as directed. If your symptoms get worse or are not getting better within 5 minutes, call 911 right away. Stay on the phone. The emergency  will give you further instructions.     · Be sure to tell your doctor about any angina symptoms that you have had, even if they went away.     · Do not take any over-the-counter medicines, vitamins, or herbal products without talking to your doctor first.   Edwige Buckll your doctor if a cardiac rehab program is right for you. Cardiac rehab can help you make lifestyle changes.  In cardiac rehab, a team of health professionals provides education and support to help you make new, healthy habits.    · Do not smoke. Avoid secondhand smoke too. Smoking can increase your risk of a heart attack or stroke. If you need help quitting, talk to your doctor about stop-smoking programs and medicines. These can increase your chances of quitting for good.     · Eat a heart-healthy diet that is high in fiber and low in saturated fat and sodium. ? Learn what a serving is. A \"serving\" and a \"portion\" are not always the same thing. Make sure that you are not eating larger portions than recommended. For example, a serving of pasta is ½ cup. A serving size of meat is 2 to 3 ounces; a 3-ounce serving is about the size of a deck of cards. ? Eat a variety of grain products every day. Include whole-grain foods such as oats, whole wheat bread, and brown rice. ? Eat fish, skinless poultry, lean meats, and soy products such as tofu instead of high-fat meats. Cut out all visible fat when you prepare meat. Eat at least 2 servings of fish a week. ? Eat a variety of fruit and vegetables every day. They have lots of nutrients that help protect against heart disease, and they have little--if any--fat. Keep carrots, celery, and other veggies handy for snacks. Buy fruit that is in season and store it where you can see it so that you will be tempted to eat it. Cook dishes that have a lot of veggies in them, such as stir-fried dishes and soups. ? Read food labels and try to avoid saturated fat and trans fat. They increase your risk of heart disease. Bake, broil, grill, or steam foods instead of frying them. Use olive or canola oil when you cook. Try cholesterol-lowering spreads, such as Benecol or Take Control. ? Limit sodium. Your doctor may recommend that you limit sodium to less than 1,500 mg a day. ? Limit processed foods, including cookies and crackers. ? Limit drinks and foods with added sugar. ?  Choose low-fat or fat-free milk and dairy products. ? Limit alcohol to 2 drinks a day for men and 1 drink a day for women. Too much alcohol can cause health problems.     · If your doctor recommends it, get more exercise. Walking is a good choice. Bit by bit, increase the amount you walk every day. Try for at least 30 minutes on most days of the week. You also may want to swim, bike, or do other activities.     · Stay at a healthy weight. Lose weight if you need to.     · Talk to your family, friends, or a therapist about your feelings. It is normal to feel upset about having this disease and to feel afraid of having a heart attack. Talking openly about your feelings can help you cope. If you think you have symptoms of depression, talk to your doctor.     · Avoid colds and flu. Get a pneumococcal vaccine shot. If you have had one before, ask your doctor whether you need another dose. Get a flu vaccine every year. If you must be around people with colds or flu, wash your hands often. When should you call for help? Call 911 anytime you think you may need emergency care. For example, call if:    · You have symptoms of a heart attack. These may include:  ? Chest pain or pressure, or a strange feeling in the chest.  ? Sweating. ? Shortness of breath. ? Nausea or vomiting. ? Pain, pressure, or a strange feeling in the back, neck, jaw, or upper belly or in one or both shoulders or arms. ? Lightheadedness or sudden weakness. ? A fast or irregular heartbeat. After you call 911, the  may tell you to chew 1 adult-strength or 2 to 4 low-dose aspirin. Wait for an ambulance.  Do not try to drive yourself.     · You have angina symptoms (such as chest pain or pressure) that do not go away with rest or are not getting better within 5 minutes after you take a dose of nitroglycerin.     · You passed out (lost consciousness).    Call your doctor now or seek immediate medical care if:    · You are having angina symptoms, such as chest pain or pressure, more often than usual, or they are different or worse than usual.     · You have new or increased shortness of breath.     · You are dizzy or lightheaded, or you feel like you may faint.    Watch closely for changes in your health, and be sure to contact your doctor if you have any problems. Where can you learn more? Go to http://clarice-carlitos.info/. Enter Q927 in the search box to learn more about \"Coronary Artery Disease: Care Instructions. \"  Current as of: July 22, 2018  Content Version: 11.9  © 2940-4895 [employer]. Care instructions adapted under license by [employer] (which disclaims liability or warranty for this information). If you have questions about a medical condition or this instruction, always ask your healthcare professional. [place] [address] any warranty or liability for your use of this information. DISCHARGE SUMMARY from Nurse    PATIENT INSTRUCTIONS:    After general anesthesia or intravenous sedation, for 24 hours or while taking prescription Narcotics:  · Limit your activities  · Do not drive and operate hazardous machinery  · Do not make important personal or business decisions  · Do  not drink alcoholic beverages  · If you have not urinated within 8 hours after discharge, please contact your surgeon on call.     Report the following to your surgeon:  · Excessive pain, swelling, redness or odor of or around the surgical area  · Temperature over 100.5  · Nausea and vomiting lasting longer than 4 hours or if unable to take medications  · Any signs of decreased circulation or nerve impairment to extremity: change in color, persistent  numbness, tingling, coldness or increase pain  · Any questions    What to do at Home:  Recommended activity: Activity as tolerated,     If you experience any of the following symptoms fever greater then 100.5, pain unrelieved by medication, increase in shortness of breath, please follow up with primary care doctor. *  Please give a list of your current medications to your Primary Care Provider. *  Please update this list whenever your medications are discontinued, doses are      changed, or new medications (including over-the-counter products) are added. *  Please carry medication information at all times in case of emergency situations. These are general instructions for a healthy lifestyle:    No smoking/ No tobacco products/ Avoid exposure to second hand smoke  Surgeon General's Warning:  Quitting smoking now greatly reduces serious risk to your health. Obesity, smoking, and sedentary lifestyle greatly increases your risk for illness    A healthy diet, regular physical exercise & weight monitoring are important for maintaining a healthy lifestyle    You may be retaining fluid if you have a history of heart failure or if you experience any of the following symptoms:  Weight gain of 3 pounds or more overnight or 5 pounds in a week, increased swelling in our hands or feet or shortness of breath while lying flat in bed. Please call your doctor as soon as you notice any of these symptoms; do not wait until your next office visit. Recognize signs and symptoms of STROKE:    F-face looks uneven    A-arms unable to move or move unevenly    S-speech slurred or non-existent    T-time-call 911 as soon as signs and symptoms begin-DO NOT go       Back to bed or wait to see if you get better-TIME IS BRAIN. Warning Signs of HEART ATTACK     Call 911 if you have these symptoms:   Chest discomfort. Most heart attacks involve discomfort in the center of the chest that lasts more than a few minutes, or that goes away and comes back. It can feel like uncomfortable pressure, squeezing, fullness, or pain.  Discomfort in other areas of the upper body. Symptoms can include pain or discomfort in one or both arms, the back, neck, jaw, or stomach.    Shortness of breath with or without chest discomfort.  Other signs may include breaking out in a cold sweat, nausea, or lightheadedness. Don't wait more than five minutes to call 911 - MINUTES MATTER! Fast action can save your life. Calling 911 is almost always the fastest way to get lifesaving treatment. Emergency Medical Services staff can begin treatment when they arrive -- up to an hour sooner than if someone gets to the hospital by car. The discharge information has been reviewed with the patient. The patient verbalized understanding. Discharge medications reviewed with the patient and appropriate educational materials and side effects teaching were provided.   ___________________________________________________________________________________________________________________________________

## 2019-03-31 NOTE — DISCHARGE SUMMARY
Physician Discharge Summary Patient ID: 
Irving Aviles [id number] 
25 y.o. 
1945 Admit date: 3/28/2019 Discharge date: 3- Diagnosis: 
1- Non-specific chest pain, negative cardiac catheterization, see report below. 2- Possible pneumonitis and mild acute hypoxic respiratory failure, resolved. 3- Hypertension, controlled 4- Obstructive sleep apnea, on CPAP Cardiac catheterization: 
HISTORY:  This is a 70-year-old gentleman with multiple risk factors for coronary artery disease. He has been having chest pain suspicious for unstable angina pectoris. Noninvasive imaging showed evidence of three-vessel coronary artery calcification. A cardiac catheterization is recommended. SURGEON:  Marika Shea MD 
PROCEDURE:  Left heart catheterization with left ventriculography and coronary angiography was carried out from the right radial artery by modified Seldinger technique with a 6-Polish multipurpose catheter and 3.5XB. The patient tolerated the procedure well. FINDINGS:  The central aortic pressure was 120/70 mmHg. Left ventricular end-diastolic pressure was 12 mmHg. There is no gradient on pullback across the aortic valve. The overall left ventricular size is normal.  The wall motion is normal.  The ejection fraction is 65%. Coronary angiography reveals a normal left main. It divides into an LAD and left circumflex. The LAD is normal. 
The right coronary artery is normal.... Hospital course:  
67 y/o M with morbid obesity, YAHAIRA compliant with CPAP, HTN, seizures, who presented to ER with chest pain. Onset this morning after waking up, constant. Substernal, dull/pressure like, moderate. Made better by nitro/ASA given by EMS. A/w SOB. Also hypoxia. Some history of renovation  Related dust exposure in house. CT chest showed CAD. No previous CAD dx or even cardiology visits. Cardiology consulted for [facility]. Patient admitted and treated as above. On day of discharge, patient exam showed clear lungs and pt was asymptomatic. PCP: Brayden Dodson MD 
 
Patient Instructions:  
Current Discharge Medication List  
  
CONTINUE these medications which have NOT CHANGED Details  
!! PHENobarbital (LUMINAL) 32.4 mg tablet Take 32.4 mg by mouth two (2) times a day. !! PHENobarbital (LUMINAL) 97.2 mg tablet Take 1 Tab by mouth nightly. Max Daily Amount: 97.2 mg. 
Qty: 30 Tab, Refills: 0  
  
amLODIPine (NORVASC) 5 mg tablet Take 5 mg by mouth daily. pregabalin (LYRICA) 150 mg capsule Take 150 mg by mouth two (2) times a day. atorvastatin (LIPITOR) 40 mg tablet Take 40 mg by mouth nightly. cholecalciferol, vitamin D3, 2,000 unit tab Take 2,000 Units by mouth every morning. hydrochlorothiazide (HYDRODIURIL) 25 mg tablet Take 25 mg by mouth every morning. cyanocobalamin (VITAMIN B-12) 1,000 mcg sublingual tablet Take 1,000 mcg by mouth daily. losartan (COZAAR) 100 mg tablet Take 100 mg by mouth every morning. aspirin 81 mg chewable tablet Take 81 mg by mouth nightly. Indications: prevention of cerebrovascular accident MULTI-VITAMIN PO Take 1 Tab by mouth daily. !! - Potential duplicate medications found. Please discuss with provider. Instructions:  
 
Diet:  Low salt, low fat Activity: As tolerated. Condition: Stable Follow-up with primary physician in 1-2 week. Time 25 min Please send copy to primary physician.  
 
Signed: 
Alaina Pruitt MD 
3/31/2019 
11:03 AM

## 2019-04-02 ENCOUNTER — HOME CARE VISIT (OUTPATIENT)
Dept: SCHEDULING | Facility: HOME HEALTH | Age: 74
End: 2019-04-02
Payer: MEDICARE

## 2019-04-02 VITALS
SYSTOLIC BLOOD PRESSURE: 148 MMHG | RESPIRATION RATE: 18 BRPM | HEART RATE: 62 BPM | TEMPERATURE: 98.9 F | OXYGEN SATURATION: 93 % | DIASTOLIC BLOOD PRESSURE: 82 MMHG

## 2019-04-02 PROCEDURE — 3331090001 HH PPS REVENUE CREDIT

## 2019-04-02 PROCEDURE — 3331090002 HH PPS REVENUE DEBIT

## 2019-04-02 PROCEDURE — G0299 HHS/HOSPICE OF RN EA 15 MIN: HCPCS

## 2019-04-02 PROCEDURE — 400013 HH SOC

## 2019-04-03 PROCEDURE — 3331090001 HH PPS REVENUE CREDIT

## 2019-04-03 PROCEDURE — 3331090002 HH PPS REVENUE DEBIT

## 2019-04-04 PROCEDURE — 3331090001 HH PPS REVENUE CREDIT

## 2019-04-04 PROCEDURE — 3331090002 HH PPS REVENUE DEBIT

## 2019-04-05 ENCOUNTER — HOME CARE VISIT (OUTPATIENT)
Dept: SCHEDULING | Facility: HOME HEALTH | Age: 74
End: 2019-04-05
Payer: MEDICARE

## 2019-04-05 PROCEDURE — 3331090002 HH PPS REVENUE DEBIT

## 2019-04-05 PROCEDURE — 3331090001 HH PPS REVENUE CREDIT

## 2019-04-06 PROCEDURE — 3331090001 HH PPS REVENUE CREDIT

## 2019-04-06 PROCEDURE — 3331090002 HH PPS REVENUE DEBIT

## 2019-04-07 PROCEDURE — 3331090002 HH PPS REVENUE DEBIT

## 2019-04-07 PROCEDURE — 3331090001 HH PPS REVENUE CREDIT

## 2019-04-08 PROCEDURE — 3331090001 HH PPS REVENUE CREDIT

## 2019-04-08 PROCEDURE — 3331090002 HH PPS REVENUE DEBIT

## 2019-04-09 ENCOUNTER — HOME CARE VISIT (OUTPATIENT)
Dept: SCHEDULING | Facility: HOME HEALTH | Age: 74
End: 2019-04-09
Payer: MEDICARE

## 2019-04-09 PROCEDURE — 3331090002 HH PPS REVENUE DEBIT

## 2019-04-09 PROCEDURE — G0299 HHS/HOSPICE OF RN EA 15 MIN: HCPCS

## 2019-04-09 PROCEDURE — 3331090001 HH PPS REVENUE CREDIT

## 2019-04-10 VITALS
OXYGEN SATURATION: 92 % | RESPIRATION RATE: 18 BRPM | TEMPERATURE: 97.8 F | HEART RATE: 68 BPM | DIASTOLIC BLOOD PRESSURE: 78 MMHG | SYSTOLIC BLOOD PRESSURE: 132 MMHG

## 2019-04-10 PROCEDURE — 3331090002 HH PPS REVENUE DEBIT

## 2019-04-10 PROCEDURE — 3331090001 HH PPS REVENUE CREDIT

## 2019-04-11 PROCEDURE — 3331090001 HH PPS REVENUE CREDIT

## 2019-04-11 PROCEDURE — 3331090002 HH PPS REVENUE DEBIT

## 2019-04-12 ENCOUNTER — HOME CARE VISIT (OUTPATIENT)
Dept: SCHEDULING | Facility: HOME HEALTH | Age: 74
End: 2019-04-12
Payer: MEDICARE

## 2019-04-12 VITALS
TEMPERATURE: 97.8 F | DIASTOLIC BLOOD PRESSURE: 90 MMHG | OXYGEN SATURATION: 92 % | RESPIRATION RATE: 18 BRPM | HEART RATE: 54 BPM | SYSTOLIC BLOOD PRESSURE: 142 MMHG

## 2019-04-12 PROCEDURE — 3331090001 HH PPS REVENUE CREDIT

## 2019-04-12 PROCEDURE — 3331090002 HH PPS REVENUE DEBIT

## 2019-04-12 PROCEDURE — G0299 HHS/HOSPICE OF RN EA 15 MIN: HCPCS

## 2019-04-13 PROCEDURE — 3331090001 HH PPS REVENUE CREDIT

## 2019-04-13 PROCEDURE — 3331090002 HH PPS REVENUE DEBIT

## 2019-04-14 PROCEDURE — 3331090002 HH PPS REVENUE DEBIT

## 2019-04-14 PROCEDURE — 3331090001 HH PPS REVENUE CREDIT

## 2019-04-15 ENCOUNTER — HOME CARE VISIT (OUTPATIENT)
Dept: SCHEDULING | Facility: HOME HEALTH | Age: 74
End: 2019-04-15
Payer: MEDICARE

## 2019-04-15 VITALS
SYSTOLIC BLOOD PRESSURE: 130 MMHG | HEART RATE: 57 BPM | OXYGEN SATURATION: 95 % | TEMPERATURE: 98.2 F | DIASTOLIC BLOOD PRESSURE: 80 MMHG | RESPIRATION RATE: 20 BRPM

## 2019-04-15 PROCEDURE — 3331090002 HH PPS REVENUE DEBIT

## 2019-04-15 PROCEDURE — G0299 HHS/HOSPICE OF RN EA 15 MIN: HCPCS

## 2019-04-15 PROCEDURE — 3331090001 HH PPS REVENUE CREDIT

## 2019-04-16 PROCEDURE — 3331090002 HH PPS REVENUE DEBIT

## 2019-04-16 PROCEDURE — 3331090001 HH PPS REVENUE CREDIT

## 2019-04-17 PROCEDURE — 3331090002 HH PPS REVENUE DEBIT

## 2019-04-17 PROCEDURE — 3331090001 HH PPS REVENUE CREDIT

## 2019-04-18 ENCOUNTER — HOME CARE VISIT (OUTPATIENT)
Dept: SCHEDULING | Facility: HOME HEALTH | Age: 74
End: 2019-04-18
Payer: MEDICARE

## 2019-04-18 VITALS
OXYGEN SATURATION: 94 % | SYSTOLIC BLOOD PRESSURE: 152 MMHG | BODY MASS INDEX: 39.2 KG/M2 | WEIGHT: 289 LBS | RESPIRATION RATE: 18 BRPM | DIASTOLIC BLOOD PRESSURE: 60 MMHG | TEMPERATURE: 98 F | HEART RATE: 56 BPM

## 2019-04-18 PROCEDURE — 3331090002 HH PPS REVENUE DEBIT

## 2019-04-18 PROCEDURE — G0299 HHS/HOSPICE OF RN EA 15 MIN: HCPCS

## 2019-04-18 PROCEDURE — 3331090001 HH PPS REVENUE CREDIT

## 2019-04-19 PROCEDURE — 3331090001 HH PPS REVENUE CREDIT

## 2019-04-19 PROCEDURE — 3331090002 HH PPS REVENUE DEBIT

## 2019-04-20 PROCEDURE — 3331090002 HH PPS REVENUE DEBIT

## 2019-04-20 PROCEDURE — 3331090001 HH PPS REVENUE CREDIT

## 2019-04-21 PROCEDURE — 3331090001 HH PPS REVENUE CREDIT

## 2019-04-21 PROCEDURE — 3331090002 HH PPS REVENUE DEBIT

## 2019-04-22 PROCEDURE — 3331090002 HH PPS REVENUE DEBIT

## 2019-04-22 PROCEDURE — 3331090001 HH PPS REVENUE CREDIT

## 2019-04-23 PROCEDURE — 3331090002 HH PPS REVENUE DEBIT

## 2019-04-23 PROCEDURE — 3331090001 HH PPS REVENUE CREDIT

## 2019-04-24 ENCOUNTER — HOME CARE VISIT (OUTPATIENT)
Dept: SCHEDULING | Facility: HOME HEALTH | Age: 74
End: 2019-04-24
Payer: MEDICARE

## 2019-04-24 PROCEDURE — G0299 HHS/HOSPICE OF RN EA 15 MIN: HCPCS

## 2019-04-24 PROCEDURE — 3331090001 HH PPS REVENUE CREDIT

## 2019-04-24 PROCEDURE — 3331090002 HH PPS REVENUE DEBIT

## 2019-04-25 VITALS
OXYGEN SATURATION: 94 % | SYSTOLIC BLOOD PRESSURE: 144 MMHG | RESPIRATION RATE: 20 BRPM | TEMPERATURE: 98.1 F | DIASTOLIC BLOOD PRESSURE: 80 MMHG | HEART RATE: 51 BPM

## 2019-04-25 PROCEDURE — 3331090001 HH PPS REVENUE CREDIT

## 2019-04-25 PROCEDURE — 3331090002 HH PPS REVENUE DEBIT

## 2019-04-26 PROCEDURE — 3331090001 HH PPS REVENUE CREDIT

## 2019-04-26 PROCEDURE — 3331090002 HH PPS REVENUE DEBIT

## 2019-04-27 PROCEDURE — 3331090001 HH PPS REVENUE CREDIT

## 2019-04-27 PROCEDURE — 3331090002 HH PPS REVENUE DEBIT

## 2019-04-28 PROCEDURE — 3331090001 HH PPS REVENUE CREDIT

## 2019-04-28 PROCEDURE — 3331090002 HH PPS REVENUE DEBIT

## 2019-04-29 PROCEDURE — 3331090001 HH PPS REVENUE CREDIT

## 2019-04-29 PROCEDURE — 3331090002 HH PPS REVENUE DEBIT

## 2019-04-30 ENCOUNTER — HOME CARE VISIT (OUTPATIENT)
Dept: SCHEDULING | Facility: HOME HEALTH | Age: 74
End: 2019-04-30
Payer: MEDICARE

## 2019-04-30 VITALS
HEART RATE: 64 BPM | RESPIRATION RATE: 18 BRPM | TEMPERATURE: 97.9 F | DIASTOLIC BLOOD PRESSURE: 80 MMHG | OXYGEN SATURATION: 93 % | SYSTOLIC BLOOD PRESSURE: 130 MMHG

## 2019-04-30 PROCEDURE — 3331090002 HH PPS REVENUE DEBIT

## 2019-04-30 PROCEDURE — G0299 HHS/HOSPICE OF RN EA 15 MIN: HCPCS

## 2019-04-30 PROCEDURE — 3331090001 HH PPS REVENUE CREDIT

## 2019-05-01 PROCEDURE — 3331090002 HH PPS REVENUE DEBIT

## 2019-05-01 PROCEDURE — 3331090001 HH PPS REVENUE CREDIT

## 2019-05-02 PROCEDURE — 3331090002 HH PPS REVENUE DEBIT

## 2019-05-02 PROCEDURE — 3331090001 HH PPS REVENUE CREDIT

## 2019-05-03 PROCEDURE — 3331090002 HH PPS REVENUE DEBIT

## 2019-05-03 PROCEDURE — 3331090001 HH PPS REVENUE CREDIT

## 2019-05-04 PROCEDURE — 3331090001 HH PPS REVENUE CREDIT

## 2019-05-04 PROCEDURE — 3331090002 HH PPS REVENUE DEBIT

## 2019-05-05 PROCEDURE — 3331090002 HH PPS REVENUE DEBIT

## 2019-05-05 PROCEDURE — 3331090001 HH PPS REVENUE CREDIT

## 2019-05-06 PROCEDURE — 3331090002 HH PPS REVENUE DEBIT

## 2019-05-06 PROCEDURE — 3331090001 HH PPS REVENUE CREDIT

## 2019-05-07 PROCEDURE — 3331090001 HH PPS REVENUE CREDIT

## 2019-05-07 PROCEDURE — 3331090002 HH PPS REVENUE DEBIT

## 2019-05-08 ENCOUNTER — HOME CARE VISIT (OUTPATIENT)
Dept: SCHEDULING | Facility: HOME HEALTH | Age: 74
End: 2019-05-08
Payer: MEDICARE

## 2019-05-08 VITALS
SYSTOLIC BLOOD PRESSURE: 156 MMHG | TEMPERATURE: 98 F | HEART RATE: 58 BPM | OXYGEN SATURATION: 94 % | RESPIRATION RATE: 14 BRPM | DIASTOLIC BLOOD PRESSURE: 88 MMHG

## 2019-05-08 PROCEDURE — 3331090002 HH PPS REVENUE DEBIT

## 2019-05-08 PROCEDURE — G0299 HHS/HOSPICE OF RN EA 15 MIN: HCPCS

## 2019-05-08 PROCEDURE — 3331090001 HH PPS REVENUE CREDIT

## 2019-05-09 PROCEDURE — 3331090001 HH PPS REVENUE CREDIT

## 2019-05-09 PROCEDURE — 3331090002 HH PPS REVENUE DEBIT

## 2019-05-10 PROCEDURE — 3331090001 HH PPS REVENUE CREDIT

## 2019-05-10 PROCEDURE — 3331090002 HH PPS REVENUE DEBIT

## 2019-05-11 PROCEDURE — 3331090002 HH PPS REVENUE DEBIT

## 2019-05-11 PROCEDURE — 3331090001 HH PPS REVENUE CREDIT

## 2019-05-12 PROCEDURE — 3331090001 HH PPS REVENUE CREDIT

## 2019-05-12 PROCEDURE — 3331090002 HH PPS REVENUE DEBIT

## 2019-05-13 PROCEDURE — 3331090001 HH PPS REVENUE CREDIT

## 2019-05-13 PROCEDURE — 3331090002 HH PPS REVENUE DEBIT

## 2019-05-14 ENCOUNTER — HOME CARE VISIT (OUTPATIENT)
Dept: SCHEDULING | Facility: HOME HEALTH | Age: 74
End: 2019-05-14
Payer: MEDICARE

## 2019-05-14 VITALS
DIASTOLIC BLOOD PRESSURE: 80 MMHG | SYSTOLIC BLOOD PRESSURE: 140 MMHG | RESPIRATION RATE: 18 BRPM | HEART RATE: 52 BPM | OXYGEN SATURATION: 96 % | TEMPERATURE: 97.9 F

## 2019-05-14 PROCEDURE — 3331090002 HH PPS REVENUE DEBIT

## 2019-05-14 PROCEDURE — 3331090001 HH PPS REVENUE CREDIT

## 2019-05-14 PROCEDURE — G0299 HHS/HOSPICE OF RN EA 15 MIN: HCPCS

## 2024-05-22 NOTE — PROGRESS NOTES
Patient was alert [place] wife at bedside Prayer for patient's procedure later today Gael Lyn, staff Gail gumzan 79, [address] [facility]  /   [email or web address] 
 1 Principal Discharge DX:	Chest pain   Principal Discharge DX:	Chest pain  Secondary Diagnosis:	Muscle strain

## (undated) DEVICE — AMD ANTIMICROBIAL GAUZE SPONGES,12 PLY USP TYPE VII, 0.2% POLYHEXAMETHYLENE BIGUANIDE HCI (PHMB): Brand: CURITY

## (undated) DEVICE — (D)PREP SKN CHLRAPRP APPL 26ML -- CONVERT TO ITEM 371833

## (undated) DEVICE — SPLINT CAST W4XL30IN WHT THMB FBRGLS PRECUT INTLOK WRINKLE

## (undated) DEVICE — PADDING CAST W2INXL4YD ST COT COHESIVE HND TEARABLE SPEC

## (undated) DEVICE — SOLUTION IV 1000ML 0.9% SOD CHL

## (undated) DEVICE — PAD,ABDOMINAL,5"X9",STERILE,LF,1/PK: Brand: MEDLINE INDUSTRIES, INC.

## (undated) DEVICE — DISPOSABLE TOURNIQUET CUFF SINGLE BLADDER, DUAL PORT AND QUICK CONNECT CONNECTOR: Brand: COLOR CUFF

## (undated) DEVICE — REM POLYHESIVE ADULT PATIENT RETURN ELECTRODE: Brand: VALLEYLAB

## (undated) DEVICE — SUTURE MCRYL SZ 3-0 L27IN ABSRB UD L19MM PS-2 3/8 CIR PRIM Y427H

## (undated) DEVICE — DRAPE C ARM W54XL84IN MINI FOR OEC 6800

## (undated) DEVICE — 2000CC GUARDIAN II: Brand: GUARDIAN

## (undated) DEVICE — AMD ANTIMICROBIAL BANDAGE ROLL,6 PLY: Brand: KERLIX

## (undated) DEVICE — BUTTON SWITCH PENCIL BLADE ELECTRODE, HOLSTER: Brand: EDGE

## (undated) DEVICE — 3M™ COBAN™ NL STERILE NON-LATEX SELF-ADHERENT WRAP, 2082S, 2 IN X 5 YD (5 CM X 4,5 M), 36 ROLLS/CASE: Brand: 3M™ COBAN™

## (undated) DEVICE — NON-ADHERING DRESSING: Brand: ADAPTIC®

## (undated) DEVICE — GUIDEWIRE ORTH DIA2.8MM 300/150MM CALIB W/ FLUT FOR 6.5MM

## (undated) DEVICE — Device

## (undated) DEVICE — STERILE HOOK LOCK LATEX FREE ELASTIC BANDAGE 4INX5YD: Brand: HOOK LOCK™

## (undated) DEVICE — DRSG GZ OIL EMUL CURAD 3X8 --

## (undated) DEVICE — STERILE HOOK LOCK LATEX FREE ELASTIC BANDAGE 6INX5YD: Brand: HOOK LOCK™

## (undated) DEVICE — PRECISION THIN, OFFSET (5.5 X 0.38 X 25.0MM)

## (undated) DEVICE — INTENDED FOR TISSUE SEPARATION, AND OTHER PROCEDURES THAT REQUIRE A SHARP SURGICAL BLADE TO PUNCTURE OR CUT.: Brand: BARD-PARKER ® STAINLESS STEEL BLADES

## (undated) DEVICE — SUT ETHLN 3-0 18IN PS1 BLK --

## (undated) DEVICE — K-WIRE

## (undated) DEVICE — BANDAGE COMPR 9 FTX4 IN SMOOTH COMFORTABLE SYNTH ESMRK LF

## (undated) DEVICE — PADDING CAST W4INXL4YD ST COT COHESIVE HND TEARABLE SPEC